# Patient Record
Sex: MALE | Race: WHITE | NOT HISPANIC OR LATINO | Employment: FULL TIME | URBAN - METROPOLITAN AREA
[De-identification: names, ages, dates, MRNs, and addresses within clinical notes are randomized per-mention and may not be internally consistent; named-entity substitution may affect disease eponyms.]

---

## 2017-01-06 ENCOUNTER — OFFICE VISIT (OUTPATIENT)
Dept: FAMILY MEDICINE | Facility: CLINIC | Age: 32
End: 2017-01-06
Payer: COMMERCIAL

## 2017-01-06 VITALS
TEMPERATURE: 97.6 F | HEART RATE: 69 BPM | WEIGHT: 157 LBS | BODY MASS INDEX: 22.2 KG/M2 | OXYGEN SATURATION: 100 % | DIASTOLIC BLOOD PRESSURE: 78 MMHG | SYSTOLIC BLOOD PRESSURE: 130 MMHG

## 2017-01-06 DIAGNOSIS — J01.90 ACUTE SINUSITIS WITH SYMPTOMS > 10 DAYS: ICD-10-CM

## 2017-01-06 DIAGNOSIS — J20.9 ACUTE BRONCHITIS, UNSPECIFIED ORGANISM: Primary | ICD-10-CM

## 2017-01-06 PROCEDURE — 99213 OFFICE O/P EST LOW 20 MIN: CPT | Performed by: PHYSICIAN ASSISTANT

## 2017-01-06 RX ORDER — AZITHROMYCIN 250 MG/1
TABLET, FILM COATED ORAL
Qty: 6 TABLET | Refills: 0 | Status: SHIPPED | OUTPATIENT
Start: 2017-01-06 | End: 2017-05-08

## 2017-01-06 ASSESSMENT — PAIN SCALES - GENERAL: PAINLEVEL: NO PAIN (0)

## 2017-01-06 NOTE — PROGRESS NOTES
SUBJECTIVE:                                                    Emir Angela is a 31 year old male who presents to clinic today for the following health issues:      RESPIRATORY SYMPTOMS      Duration: 2 weeks    Description  nasal congestion, rhinorrhea, sore throat, facial pain/pressure, cough, fatigue/malaise, myalgias and chest feels tight when cough    Severity: moderate    Accompanying signs and symptoms: None    History (predisposing factors):  none    Precipitating or alleviating factors: None    Therapies tried and outcome:  Sudafed helps a little           Problem list and histories reviewed & adjusted, as indicated.  Additional history: 30 y/o male c/o not feeling well for the last 14 days.  Admits to the above symptoms with sinus pressure and congestion being the worst.     Problem list, Medication list, Allergies, and Medical/Social/Surgical histories reviewed in EPIC and updated as appropriate.    ROS:  Constitutional, HEENT, cardiovascular, pulmonary, gi and gu systems are negative, except as otherwise noted.    OBJECTIVE:                                                    /78 mmHg  Pulse 69  Temp(Src) 97.6  F (36.4  C) (Oral)  Wt 157 lb (71.215 kg)  SpO2 100%  Body mass index is 22.2 kg/(m^2).  GENERAL: alert and no distress  EYES: Eyes grossly normal to inspection  HENT: b/l TM dullness with loss of light reflux nasal mucosa is erythematous and edematous   NECK: no adenopathy, no asymmetry, masses, or scars and thyroid normal to palpation  RESP: rhonchi throughout  CV: regular rate and rhythm, normal S1 S2, no S3 or S4, no murmur, click or rub, no peripheral edema and peripheral pulses strong    Diagnostic Test Results:  none      ASSESSMENT/PLAN:                                                            1. Acute bronchitis, unspecified organism  OTC probiotics while on antibiotic to help limit some potential side effects.   - azithromycin (ZITHROMAX) 250 MG tablet; Two tablets first  day, then one tablet daily for four days.  Dispense: 6 tablet; Refill: 0    2. Acute sinusitis with symptoms > 10 days    - azithromycin (ZITHROMAX) 250 MG tablet; Two tablets first day, then one tablet daily for four days.  Dispense: 6 tablet; Refill: 0    Follow up if symptoms persist or worsen     Onel Chun PA-C  [facility]

## 2017-01-06 NOTE — NURSING NOTE
"No chief complaint on file.      Initial /78 mmHg  Pulse 69  Temp(Src) 97.6  F (36.4  C) (Oral)  Wt 157 lb (71.215 kg)  SpO2 100% Estimated body mass index is 22.2 kg/(m^2) as calculated from the following:    Height as of 2/22/16: 5' 10.5\" (1.791 m).    Weight as of this encounter: 157 lb (71.215 kg).  BP completed using cuff size: regular    "

## 2017-05-08 ENCOUNTER — OFFICE VISIT (OUTPATIENT)
Dept: FAMILY MEDICINE | Facility: CLINIC | Age: 32
End: 2017-05-08
Payer: COMMERCIAL

## 2017-05-08 VITALS
DIASTOLIC BLOOD PRESSURE: 74 MMHG | BODY MASS INDEX: 21.98 KG/M2 | WEIGHT: 157 LBS | SYSTOLIC BLOOD PRESSURE: 132 MMHG | HEART RATE: 85 BPM | HEIGHT: 71 IN | TEMPERATURE: 99.4 F | OXYGEN SATURATION: 98 %

## 2017-05-08 DIAGNOSIS — R07.0 THROAT PAIN: Primary | ICD-10-CM

## 2017-05-08 DIAGNOSIS — J06.9 UPPER RESPIRATORY TRACT INFECTION, UNSPECIFIED TYPE: ICD-10-CM

## 2017-05-08 LAB
DEPRECATED S PYO AG THROAT QL EIA: NORMAL
MICRO REPORT STATUS: NORMAL
SPECIMEN SOURCE: NORMAL

## 2017-05-08 PROCEDURE — 87081 CULTURE SCREEN ONLY: CPT | Performed by: FAMILY MEDICINE

## 2017-05-08 PROCEDURE — 99213 OFFICE O/P EST LOW 20 MIN: CPT | Performed by: FAMILY MEDICINE

## 2017-05-08 PROCEDURE — 87880 STREP A ASSAY W/OPTIC: CPT | Performed by: FAMILY MEDICINE

## 2017-05-08 NOTE — PATIENT INSTRUCTIONS
[facility]    If you have any questions regarding to your visit please contact your care team:       Team Purple:   Clinic Hours Telephone Number   [address] Dr[address]   7am-7pm  Monday - Thursday   7am-5pm  Fridays  [phone]  (Appointment scheduling available 24/7)    Questions about your Visit?   Team Line:  [phone]   Urgent Care - [place] and [facility] - 11am-9pm Monday-Friday Saturday-Sunday- 9am-5pm   [place] - 5pm-9pm Monday-Friday Saturday-Sunday- 9am-5pm  [phone] - [facility]  [phone] - [place]       What options do I have for visits at the clinic other than the traditional office visit?  To expand how we care for you, many of our providers are utilizing electronic visits (e-visits) and telephone visits, when medically appropriate, for interactions with their patients rather than a visit in the clinic.   We also offer nurse visits for many medical concerns. Just like any other service, we will bill your insurance company for this type of visit based on time spent on the phone with your provider. Not all insurance companies cover these visits. Please check with your medical insurance if this type of visit is covered. You will be responsible for any charges that are not paid by your insurance.      E-visits via MembraneX:  generally incur a $35.00 fee.  Telephone visits:  Time spent on the phone: *charged based on time that is spent on the phone in increments of 10 minutes. Estimated cost:   5-10 mins $30.00   11-20 mins. $59.00   21-30 mins. $85.00     Use Naverat (secure email communication and access to your chart) to send your primary care provider a message or make an appointment. Ask someone on your Team how to sign up for MembraneX.  For a Price Quote for your services, please call our Consumer Price Line at [phone].  As always, Thank you for trusting us with your health care needs!

## 2017-05-08 NOTE — MR AVS SNAPSHOT
After Visit Summary   5/8/2017    Emir Angela    MRN: [id number]           Patient Information     Date Of Birth          1985        Visit Information        Provider Department      5/8/2017 10:30 AM Yanci Casper MD [facility]        Today's Diagnoses     Throat pain    -  1      Care Instructions    [facility]    If you have any questions regarding to your visit please contact your care team:       Team Purple:   Clinic Hours Telephone Number   [address] Dr.   7am-7pm  Monday - Thursday   7am-5pm  Fridays  [phone]  (Appointment scheduling available 24/7)    Questions about your Visit?   Team Line:  [phone]   Urgent Care - [place] and [facility] - 11am-9pm Monday-Friday Saturday-Sunday- 9am-5pm   [place] - 5pm-9pm Monday-Friday Saturday-Sunday- 9am-5pm  [phone] - [facility]  [phone] - [place]       What options do I have for visits at the clinic other than the traditional office visit?  To expand how we care for you, many of our providers are utilizing electronic visits (e-visits) and telephone visits, when medically appropriate, for interactions with their patients rather than a visit in the clinic.   We also offer nurse visits for many medical concerns. Just like any other service, we will bill your insurance company for this type of visit based on time spent on the phone with your provider. Not all insurance companies cover these visits. Please check with your medical insurance if this type of visit is covered. You will be responsible for any charges that are not paid by your insurance.      E-visits via NinthDecimal:  generally incur a $35.00 fee.  Telephone visits:  Time spent on the phone: *charged based on time that is spent on the phone in increments of 10 minutes. Estimated cost:   5-10 mins $30.00   11-20 mins. $59.00   21-30 mins. $85.00     Use  "MyChart (secure email communication and access to your chart) to send your primary care provider a message or make an appointment. Ask someone on your Team how to sign up for OrthoPediactricshart.  For a Price Quote for your services, please call our Cubicle Price Line at [phone].  As always, Thank you for trusting us with your health care needs!            Follow-ups after your visit        Who to contact     If you have questions or need follow up information about today's clinic visit or your schedule please contact [facility] directly at [phone].  Normal or non-critical lab and imaging results will be communicated to you by MyChart, letter or phone within 4 business days after the clinic has received the results. If you do not hear from us within 7 days, please contact the clinic through OrthoPediactricshart or phone. If you have a critical or abnormal lab result, we will notify you by phone as soon as possible.  Submit refill requests through [email or web address] or call your pharmacy and they will forward the refill request to us. Please allow 3 business days for your refill to be completed.          Additional Information About Your Visit        MyChart Information     OrthoPediactricshart gives you secure access to your electronic health record. If you see a primary care provider, you can also send messages to your care team and make appointments. If you have questions, please call your primary care clinic.  If you do not have a primary care provider, please call [phone] and they will assist you.        Care EveryWhere ID     This is your Care EveryWhere ID. This could be used by other organizations to access your [facility] medical records  [id number]        Your Vitals Were     Pulse Temperature Height Pulse Oximetry BMI (Body Mass Index)       85 99.4  F (37.4  C) 5' 10.5\" (1.791 m) 98% 22.21 kg/m2        Blood Pressure from Last 3 Encounters:   05/08/17 132/74   01/06/17 130/78   02/22/16 120/79    Weight from Last 3 " Encounters:   05/08/17 157 lb (71.2 kg)   01/06/17 157 lb (71.2 kg)   02/22/16 153 lb (69.4 kg)              We Performed the Following     Beta strep group A culture     Rapid strep screen        Primary Care Provider Office Phone #    [facility] [phone]       No address on file        Thank you!     Thank you for choosing [facility]  for your care. Our goal is always to provide you with excellent care. Hearing back from our patients is one way we can continue to improve our services. Please take a few minutes to complete the written survey that you may receive in the mail after your visit with us. Thank you!             Your Updated Medication List - Protect others around you: Learn how to safely use, store and throw away your medicines at www.disposemymeds.org.      Notice  As of 5/8/2017 11:00 AM    You have not been prescribed any medications.

## 2017-05-08 NOTE — LETTER
[address] [place] [address]    May 9, 2017    Emir Angela  [address] [address]      Dear Emir,    Your strep culture was negative. I wish you well.     Enclosed is a copy of your results.   Results for orders placed or performed in visit on 05/08/17   Rapid strep screen   Result Value Ref Range    Specimen Description Throat     Rapid Strep A Screen       NEGATIVE: No Group A streptococcal antigen detected by immunoassay, await   culture report.      Micro Report Status FINAL 05/08/2017    Beta strep group A culture   Result Value Ref Range    Specimen Description Throat     Culture Micro No Beta Streptococcus isolated     Micro Report Status FINAL 05/09/2017        If you have any questions or concerns, please call myself or my nurse at [phone].    Sincerely,    Yanci Casper MD/mame

## 2017-05-08 NOTE — NURSING NOTE
"Chief Complaint   Patient presents with     URI       Initial /74 (BP Location: Right arm, Patient Position: Chair, Cuff Size: Adult Large)  Pulse 85  Temp 99.4  F (37.4  C)  Ht 5' 10.5\" (1.791 m)  Wt 157 lb (71.2 kg)  SpO2 98%  BMI 22.21 kg/m2 Estimated body mass index is 22.21 kg/(m^2) as calculated from the following:    Height as of this encounter: 5' 10.5\" (1.791 m).    Weight as of this encounter: 157 lb (71.2 kg).  Medication Reconciliation: complete   Nayeli Montgomery MA      "

## 2017-05-08 NOTE — PROGRESS NOTES
SUBJECTIVE:                                                    Emir Angela is a 32 year old male who presents to clinic today for the following health issues:      ENT Symptoms             Symptoms: cc Present Absent Comment   Fever/Chills  x     Fatigue  x     Muscle Aches  x     Eye Irritation   x    Sneezing  x     Nasal Talha/Drg  x     Sinus Pressure/Pain  x     Loss of smell  x     Dental pain   x    Sore Throat  x     Swollen Glands   x    Ear Pain/Fullness   x    Cough  x     Wheeze  x     Chest Pain  x     Shortness of breath  x     Rash       Other         Symptom duration:  5 days   Symptom severity:  severe   Treatments tried:  tylenol,fluids,rest   Contacts:  no              Problem list and histories reviewed & adjusted, as indicated.  Additional history: as documented    Patient Active Problem List   Diagnosis     Cervicalgia     Nonallopathic lesion of thoracic region     Chronic nonallergic rhinitis     GERD (gastroesophageal reflux disease)     CARDIOVASCULAR SCREENING; LDL GOAL LESS THAN 160     Sacral pain     Urinary frequency     Gastroesophageal reflux disease without esophagitis     Hip pain, bilateral     Past Surgical History:   Procedure Laterality Date     APPENDECTOMY  2004     SINUS SURGERY  4/27/10       Social History   Substance Use Topics     Smoking status: Never Smoker     Smokeless tobacco: Never Used     Alcohol use 0.0 oz/week     0 Standard drinks or equivalent per week      Comment: once a wk      Family History   Problem Relation Age of Onset     Hypertension Father      Cardiovascular Maternal Grandfather      HEART DISEASE Maternal Grandfather      Allergies Brother          No current outpatient prescriptions on file.     Labs reviewed in EPIC    Reviewed and updated as needed this visit by clinical staff  Tobacco  Allergies  Meds  Med Hx  Surg Hx  Fam Hx  Soc Hx      Reviewed and updated as needed this visit by Provider         ROS:  This 32 year old male is  "here today because he has had a sore throat for the past 5 days. No fevers. He is an . He is flying to  [place] tomorrow to participate in a large disk golf tournament. He has had symptoms of a little head cold as well.  All other review of systems are negative  Personal, family, and social history reviewed with patient and revised.         OBJECTIVE:                                                    /74 (BP Location: Right arm, Patient Position: Chair, Cuff Size: Adult Large)  Pulse 85  Temp 99.4  F (37.4  C)  Ht 5' 10.5\" (1.791 m)  Wt 157 lb (71.2 kg)  SpO2 98%  BMI 22.21 kg/m2  Body mass index is 22.21 kg/(m^2).  GENERAL: healthy, alert and no distress  EYES: Eyes grossly normal to inspection, PERRL and conjunctivae and sclerae normal  HENT: ear canals and TM's normal, nose and mouth without ulcers or lesions but he has mucous down the back of his throat. Most likely due to a viral upper respiratory illness and the source of his sore throat.   NECK: no adenopathy, no asymmetry, masses, or scars and thyroid normal to palpation  RESP: lungs clear to auscultation - no rales, rhonchi or wheezes  CV: regular rate and rhythm, normal S1 S2, no S3 or S4, no murmur, click or rub, no peripheral edema and peripheral pulses strong  MS: no gross musculoskeletal defects noted, no edema    Diagnostic Test Results:  Results for orders placed or performed in visit on 05/08/17 (from the past 24 hour(s))   Rapid strep screen   Result Value Ref Range    Specimen Description Throat     Rapid Strep A Screen       NEGATIVE: No Group A streptococcal antigen detected by immunoassay, await   culture report.      Micro Report Status FINAL 05/08/2017         ASSESSMENT/PLAN:                                                             1. Throat pain  As above   - Rapid strep screen  - Beta strep group A culture    2. Upper respiratory tract infection, unspecified type  Reassured. OK to fly  Stay well hydrated "       Return to clinic if no improvement     TRUPTI MADISON MD  [facility]

## 2017-05-09 LAB
BACTERIA SPEC CULT: NORMAL
MICRO REPORT STATUS: NORMAL
SPECIMEN SOURCE: NORMAL

## 2018-03-26 ENCOUNTER — OFFICE VISIT (OUTPATIENT)
Dept: FAMILY MEDICINE | Facility: CLINIC | Age: 33
End: 2018-03-26
Payer: COMMERCIAL

## 2018-03-26 VITALS
SYSTOLIC BLOOD PRESSURE: 124 MMHG | HEIGHT: 71 IN | RESPIRATION RATE: 15 BRPM | BODY MASS INDEX: 22.02 KG/M2 | HEART RATE: 80 BPM | OXYGEN SATURATION: 100 % | DIASTOLIC BLOOD PRESSURE: 76 MMHG | TEMPERATURE: 98.4 F | WEIGHT: 157.3 LBS

## 2018-03-26 DIAGNOSIS — K21.00 GASTROESOPHAGEAL REFLUX DISEASE WITH ESOPHAGITIS: Primary | ICD-10-CM

## 2018-03-26 DIAGNOSIS — R00.2 PALPITATIONS: ICD-10-CM

## 2018-03-26 PROCEDURE — 99214 OFFICE O/P EST MOD 30 MIN: CPT | Performed by: FAMILY MEDICINE

## 2018-03-26 NOTE — NURSING NOTE
"Chief Complaint   Patient presents with     RECHECK     Heartburn concerns- also feels as if he's been having some heart palpitations recently.  Notices this mostly when he lays down.       Initial There were no vitals taken for this visit. Estimated body mass index is 22.21 kg/(m^2) as calculated from the following:    Height as of 5/8/17: 5' 10.5\" (1.791 m).    Weight as of 5/8/17: 157 lb (71.2 kg).  Medication Reconciliation: complete    Health Maintenance Due Pending Provider Review:  NONE    n/a    Monica Art MA  [facility]  "

## 2018-03-26 NOTE — PROGRESS NOTES
SUBJECTIVE:   Emir Angela is a 33 year old male who presents to clinic today for the following health issues:      Chief Complaint   Patient presents with     RECHECK     Heartburn concerns- also feels as if he's been having some heart palpitations recently.  Notices this mostly when he lays down.     History of reflux since teenage years  Worse at night and with certain foods  Takes a zantac  Never had any workup in the past, got some Rx prilosec in the past    Mom has bad reflux    palpitatons 6 x in the last 6 months  Center/middle of chest, feels like irregular or skipping beats, even stopping feeling  Has not checked pulse while doing it    Feels like he just worked out but laying flat      ROS: As per HPI.    Ears/Nose/Throat: No runny nose, sore throat or ear pain  CV:  no lower extremity swelling.  Resp: no shortness of breath, wheezing, or cough.  GI: no nausea/vomiting/diarrhea, no black or bloody stools      I have reviewed and updated the patient's past medical history in the EMR. Current problems are:    Patient Active Problem List   Diagnosis     Cervicalgia     Nonallopathic lesion of thoracic region     Chronic nonallergic rhinitis     GERD (gastroesophageal reflux disease)     CARDIOVASCULAR SCREENING; LDL GOAL LESS THAN 160     Sacral pain     Urinary frequency     Gastroesophageal reflux disease without esophagitis     Hip pain, bilateral     Past Surgical History:   Procedure Laterality Date     APPENDECTOMY  2004     SINUS SURGERY  4/27/10       Social History   Substance Use Topics     Smoking status: Never Smoker     Smokeless tobacco: Never Used     Alcohol use 0.0 oz/week     0 Standard drinks or equivalent per week      Comment: once a wk      Family History   Problem Relation Age of Onset     Hypertension Father      Cardiovascular Maternal Grandfather      HEART DISEASE Maternal Grandfather      Allergies Brother          Allergies, reviewed:     Allergies   Allergen Reactions      "Amoxicillin      Sulfa Drugs Hives       No current outpatient prescriptions on file.       Objective:  /76  Pulse 80  Temp 98.4  F (36.9  C) (Oral)  Resp 15  Ht 5' 10.5\" (1.791 m)  Wt 157 lb 4.8 oz (71.4 kg)  SpO2 100%  BMI 22.25 kg/m2  General Appearance: Pleasant, alert, WN/WD in no acute respiratory distress.  Chest/Respiratory Exam: Normal, comfortable, easy respirations. Chest wall normal. Lungs are clear to auscultation. No wheezing, crackles, or rhonchi.  Cardiovascular Exam: Regular rate and rhythm. No murmur, gallop, or rubs. No pedal edema.  Gastrointestinal Exam: Soft, non-tender, no masses or organomegaly.  Skin: no rash, warm and dry.  Neurologic Exam: Nonfocal, no tremor. Normal gait.  Psychiatric Exam: Alert - appropriate, normal affect    ASSESSMENT/PLAN:    ICD-10-CM    1. Gastroesophageal reflux disease with esophagitis K21.0 GASTROENTEROLOGY ADULT REF PROCEDURE ONLY [facility] [phone]; [facility]     GENERAL SURG ADULT REFERRAL   2. Palpitations R00.2       Significant reflux symptoms over the last 10 years without any history of follow-up for this  Strong family history of reflux  No known history of esophageal cancer or Patel's esophagus  Discussed the pathophysiology of reflux and his esophageal sphincter  This could be evaluated as well as screening for esophagitis with a upper GI endoscopy  Discussed the differences in medications between H2 blockers and proton pump inhibitors  In consideration of follow-up with general surgery for a procedure to improve the tone of his esophageal sphincter, sometimes considered a last resort but may be indicated in a younger person who does not want to take medication for the rest of his life    Palpitations with  Differential Dx includes atrial fibrillation and V. tach supraventricular tachycardia or premature ventricular contractions.  This very well could be due to his reflux  I also think that probability of " a cardiac or coronary artery disease related arrhythmia is very low because of: atypical symptoms, age, lack of other lifestyle risk factors. Also patient can exercise strenuously without problems  I lean towards reflux as a cause  Discussed more concerning symptoms and follow-up as needed    Bob Concepcion MD MPH

## 2018-03-26 NOTE — MR AVS SNAPSHOT
After Visit Summary   3/26/2018    Emir Angela    MRN: [id number]           Patient Information     Date Of Birth          1985        Visit Information        Provider Department      3/26/2018 1:00 PM Bob Concepcion MD [facility]        Today's Diagnoses     Gastroesophageal reflux disease with esophagitis    -  1    Palpitations           Follow-ups after your visit        Additional Services     GASTROENTEROLOGY ADULT REF PROCEDURE ONLY Coy Sotomayorierge [phone]; [facility]       Last Lab Result: Creatinine (mg/dL)       Date                     Value                 01/08/2010               1.14             ----------  Body mass index is 22.25 kg/(m^2).     Needed:  No  Language:  English    Patient will be contacted to schedule procedure.     Please be aware that coverage of these services is subject to the terms and limitations of your health insurance plan.  Call member services at your health plan with any benefit or coverage questions.  Any procedures must be performed at a [facility] facility OR coordinated by your clinic's referral office.    Please bring the following with you to your appointment:    (1) Any X-Rays, CTs or MRIs which have been performed.  Contact the facility where they were done to arrange for  prior to your scheduled appointment.    (2) List of current medications   (3) This referral request   (4) Any documents/labs given to you for this referral            GENERAL SURG ADULT REFERRAL       Your provider has referred you to: SARAH: [facility] [place] [phone]   [email or web address]    Please be aware that coverage of these services is subject to the terms and limitations of your health insurance plan.  Call member services at your health plan with any benefit or coverage questions.      Please bring the following with you to your appointment:    (1)  "Any X-Rays, CTs or MRIs which have been performed.  Contact the facility where they were done to arrange for  prior to your scheduled appointment.   (2) List of current medications   (3) This referral request   (4) Any documents/labs given to you for this referral                  Who to contact     If you have questions or need follow up information about today's clinic visit or your schedule please contact [facility] directly at [phone].  Normal or non-critical lab and imaging results will be communicated to you by [employer], letter or phone within 4 business days after the clinic has received the results. If you do not hear from us within 7 days, please contact the clinic through [employer] or phone. If you have a critical or abnormal lab result, we will notify you by phone as soon as possible.  Submit refill requests through [employer] or call your pharmacy and they will forward the refill request to us. Please allow 3 business days for your refill to be completed.          Additional Information About Your Visit        [employer] Information     [employer] gives you secure access to your electronic health record. If you see a primary care provider, you can also send messages to your care team and make appointments. If you have questions, please call your primary care clinic.  If you do not have a primary care provider, please call [phone] and they will assist you.        Care EveryWhere ID     This is your Care EveryWhere ID. This could be used by other organizations to access your [facility] medical records  [id number]        Your Vitals Were     Pulse Temperature Respirations Height Pulse Oximetry BMI (Body Mass Index)    80 98.4  F (36.9  C) (Oral) 15 5' 10.5\" (1.791 m) 100% 22.25 kg/m2       Blood Pressure from Last 3 Encounters:   03/26/18 124/76   05/08/17 132/74   01/06/17 130/78    Weight from Last 3 Encounters:   03/26/18 157 lb 4.8 oz (71.4 kg)   05/08/17 157 lb (71.2 kg)   01/06/17 157 " lb (71.2 kg)              We Performed the Following     GASTROENTEROLOGY ADULT REF PROCEDURE ONLY Coy Foote [phone]; [facility]     GENERAL SURG ADULT REFERRAL        Primary Care Provider Office Phone # Fax #    [facility] [phone] [address], [address]  [facility] [address]        Equal Access to Services     VERONA BANERJEE : Hadii aad ku hadasho Soomaali, waaxda luqadaha, qaybta kaalmada adeegyada, waxay idiin hayaan adeeg kharash la'aan . So [facility] [phone].    ATENCIÓN: Si habla español, tiene a ansari disposición servicios gratuitos de asistencia lingüística. Nava al [phone].    We comply with applicable federal civil rights laws and [place] laws. We do not discriminate on the basis of race, color, national origin, age, disability, sex, sexual orientation, or gender identity.            Thank you!     Thank you for choosing [facility]  for your care. Our goal is always to provide you with excellent care. Hearing back from our patients is one way we can continue to improve our services. Please take a few minutes to complete the written survey that you may receive in the mail after your visit with us. Thank you!             Your Updated Medication List - Protect others around you: Learn how to safely use, store and throw away your medicines at www.disposemymeds.org.      Notice  As of 3/26/2018  1:28 PM    You have not been prescribed any medications.

## 2018-10-29 ENCOUNTER — TELEPHONE (OUTPATIENT)
Dept: FAMILY MEDICINE | Facility: CLINIC | Age: 33
End: 2018-10-29

## 2018-10-29 DIAGNOSIS — K21.9 GASTROESOPHAGEAL REFLUX DISEASE, ESOPHAGITIS PRESENCE NOT SPECIFIED: Primary | ICD-10-CM

## 2018-10-29 NOTE — TELEPHONE ENCOUNTER
Pt seen Dr. Concepcion in March of 2018 for heartburn and acid reflux.  Dr. Concepcion submitted an order, however it is for a colonoscopy, rather than endoscopy.  Please reorder or advise pt so that he can make appointment.  Please send myChart note so pt knows that he can make appt.

## 2018-11-09 ENCOUNTER — HOSPITAL ENCOUNTER (OUTPATIENT)
Facility: CLINIC | Age: 33
Discharge: HOME | End: 2018-11-09
Attending: SURGERY | Admitting: SURGERY
Payer: COMMERCIAL

## 2018-11-09 ENCOUNTER — SURGERY (OUTPATIENT)
Age: 33
End: 2018-11-09

## 2018-11-09 ENCOUNTER — APPOINTMENT (OUTPATIENT)
Dept: SURGERY | Facility: PHYSICIAN GROUP | Age: 33
End: 2018-11-09
Payer: COMMERCIAL

## 2018-11-09 VITALS
DIASTOLIC BLOOD PRESSURE: 81 MMHG | RESPIRATION RATE: 12 BRPM | SYSTOLIC BLOOD PRESSURE: 118 MMHG | WEIGHT: 155 LBS | OXYGEN SATURATION: 97 % | HEIGHT: 71 IN | BODY MASS INDEX: 21.7 KG/M2

## 2018-11-09 LAB — UPPER GI ENDOSCOPY: NORMAL

## 2018-11-09 PROCEDURE — 25000128 H RX IP 250 OP 636: Performed by: SURGERY

## 2018-11-09 PROCEDURE — 43239 EGD BIOPSY SINGLE/MULTIPLE: CPT | Performed by: SURGERY

## 2018-11-09 PROCEDURE — 88305 TISSUE EXAM BY PATHOLOGIST: CPT | Performed by: SURGERY

## 2018-11-09 PROCEDURE — 99152 MOD SED SAME PHYS/QHP 5/>YRS: CPT | Mod: 59 | Performed by: SURGERY

## 2018-11-09 PROCEDURE — G0500 MOD SEDAT ENDO SERVICE >5YRS: HCPCS | Performed by: SURGERY

## 2018-11-09 PROCEDURE — 25000125 ZZHC RX 250: Performed by: SURGERY

## 2018-11-09 PROCEDURE — 88305 TISSUE EXAM BY PATHOLOGIST: CPT | Mod: 26 | Performed by: SURGERY

## 2018-11-09 RX ORDER — LIDOCAINE 40 MG/G
CREAM TOPICAL
Status: DISCONTINUED | OUTPATIENT
Start: 2018-11-09 | End: 2018-11-09 | Stop reason: HOSPADM

## 2018-11-09 RX ORDER — ONDANSETRON 2 MG/ML
4 INJECTION INTRAMUSCULAR; INTRAVENOUS
Status: DISCONTINUED | OUTPATIENT
Start: 2018-11-09 | End: 2018-11-09 | Stop reason: HOSPADM

## 2018-11-09 RX ORDER — FENTANYL CITRATE 50 UG/ML
INJECTION, SOLUTION INTRAMUSCULAR; INTRAVENOUS PRN
Status: DISCONTINUED | OUTPATIENT
Start: 2018-11-09 | End: 2018-11-09 | Stop reason: HOSPADM

## 2018-11-09 RX ADMIN — MIDAZOLAM 2 MG: 1 INJECTION INTRAMUSCULAR; INTRAVENOUS at 12:29

## 2018-11-09 RX ADMIN — TOPICAL ANESTHETIC 1 SPRAY: 200 SPRAY DENTAL; PERIODONTAL at 12:28

## 2018-11-09 RX ADMIN — FENTANYL CITRATE 100 MCG: 50 INJECTION, SOLUTION INTRAMUSCULAR; INTRAVENOUS at 12:29

## 2018-11-12 LAB — COPATH REPORT: NORMAL

## 2019-01-18 ENCOUNTER — OFFICE VISIT (OUTPATIENT)
Dept: FAMILY MEDICINE | Facility: CLINIC | Age: 34
End: 2019-01-18
Payer: COMMERCIAL

## 2019-01-18 VITALS
HEART RATE: 68 BPM | WEIGHT: 154.5 LBS | OXYGEN SATURATION: 99 % | BODY MASS INDEX: 21.63 KG/M2 | HEIGHT: 71 IN | DIASTOLIC BLOOD PRESSURE: 88 MMHG | TEMPERATURE: 97.4 F | SYSTOLIC BLOOD PRESSURE: 130 MMHG

## 2019-01-18 DIAGNOSIS — R07.0 THROAT PAIN: Primary | ICD-10-CM

## 2019-01-18 DIAGNOSIS — B34.9 VIRAL ILLNESS: ICD-10-CM

## 2019-01-18 LAB
DEPRECATED S PYO AG THROAT QL EIA: NORMAL
SPECIMEN SOURCE: NORMAL

## 2019-01-18 PROCEDURE — 87880 STREP A ASSAY W/OPTIC: CPT | Performed by: PHYSICIAN ASSISTANT

## 2019-01-18 PROCEDURE — 87081 CULTURE SCREEN ONLY: CPT | Performed by: PHYSICIAN ASSISTANT

## 2019-01-18 PROCEDURE — 99213 OFFICE O/P EST LOW 20 MIN: CPT | Performed by: PHYSICIAN ASSISTANT

## 2019-01-18 ASSESSMENT — MIFFLIN-ST. JEOR: SCORE: 1655

## 2019-01-18 NOTE — PROGRESS NOTES
"  SUBJECTIVE:   Emir Angela is a 33 year old male who presents to clinic today for the following health issues:      RESPIRATORY SYMPTOMS      Duration: x3 days     Description  nasal congestion, rhinorrhea, sore throat, facial pain/pressure, cough, wheezing, fever, chills, ear discomfort - left, headache, fatigue/malaise and hoarse voice    Severity: moderate to severe    Accompanying signs and symptoms: None    History (predisposing factors):  none    Precipitating or alleviating factors: None    Therapies tried and outcome:  Acetaminophen - helps with sx temporarily           Problem list and histories reviewed & adjusted, as indicated.  Additional history: 34 y/o male c/o not feeling well for the last 3 days.  Admits to the above symptoms with ST being the worst.     BP Readings from Last 3 Encounters:   01/18/19 130/88   11/09/18 118/81   03/26/18 124/76    Wt Readings from Last 3 Encounters:   01/18/19 70.1 kg (154 lb 8 oz)   11/09/18 70.3 kg (155 lb)   03/26/18 71.4 kg (157 lb 4.8 oz)                    Reviewed and updated as needed this visit by clinical staff       Reviewed and updated as needed this visit by Provider         ROS:  Constitutional, HEENT, cardiovascular, pulmonary, gi and gu systems are negative, except as otherwise noted.    OBJECTIVE:     /88   Pulse 68   Temp 97.4  F (36.3  C) (Oral)   Ht 1.791 m (5' 10.5\")   Wt 70.1 kg (154 lb 8 oz)   SpO2 99%   BMI 21.86 kg/m    Body mass index is 21.86 kg/m .  GENERAL: alert and no distress  EYES: Eyes grossly normal to inspection  HENT: ear canals and TM's normal, nose and mouth without ulcers or lesions  NECK: no adenopathy, no asymmetry, masses, or scars and thyroid normal to palpation  RESP: lungs clear to auscultation - no rales, rhonchi or wheezes  CV: regular rate and rhythm, normal S1 S2, no S3 or S4, no murmur, click or rub, no peripheral edema and peripheral pulses strong  PSYCH: mentation appears normal, affect " normal/bright    Diagnostic Test Results:  Results for orders placed or performed in visit on 01/18/19 (from the past 24 hour(s))   Strep, Rapid Screen   Result Value Ref Range    Specimen Description Throat     Rapid Strep A Screen       NEGATIVE: No Group A streptococcal antigen detected by immunoassay, await culture report.       ASSESSMENT/PLAN:             1. Throat pain    - Strep, Rapid Screen  - Beta strep group A culture    2. Viral illness  Supportive care.      Follow up if symptoms persist or worsen     Onel Chun PA-C  [facility]

## 2019-01-18 NOTE — NURSING NOTE
"Chief Complaint   Patient presents with     URI     /88   Pulse 68   Temp 97.4  F (36.3  C) (Oral)   Ht 1.791 m (5' 10.5\")   Wt 70.1 kg (154 lb 8 oz)   SpO2 99%   BMI 21.86 kg/m   Estimated body mass index is 21.86 kg/m  as calculated from the following:    Height as of this encounter: 1.791 m (5' 10.5\").    Weight as of this encounter: 70.1 kg (154 lb 8 oz).  Medication Reconciliation: complete      Health Maintenance that is potentially due pending provider review:  NONE    n/a    CHA Almonte  "

## 2019-01-20 LAB
BACTERIA SPEC CULT: NORMAL
SPECIMEN SOURCE: NORMAL

## 2019-02-21 ENCOUNTER — MYC MEDICAL ADVICE (OUTPATIENT)
Dept: FAMILY MEDICINE | Facility: CLINIC | Age: 34
End: 2019-02-21

## 2019-06-14 ENCOUNTER — OFFICE VISIT (OUTPATIENT)
Dept: FAMILY MEDICINE | Facility: CLINIC | Age: 34
End: 2019-06-14
Payer: COMMERCIAL

## 2019-06-14 VITALS
RESPIRATION RATE: 12 BRPM | TEMPERATURE: 97.9 F | WEIGHT: 150 LBS | SYSTOLIC BLOOD PRESSURE: 116 MMHG | HEIGHT: 70 IN | DIASTOLIC BLOOD PRESSURE: 80 MMHG | HEART RATE: 75 BPM | BODY MASS INDEX: 21.47 KG/M2 | OXYGEN SATURATION: 97 %

## 2019-06-14 DIAGNOSIS — K62.89 RECTAL PAIN: Primary | ICD-10-CM

## 2019-06-14 DIAGNOSIS — L30.9 DERMATITIS: ICD-10-CM

## 2019-06-14 PROCEDURE — 99213 OFFICE O/P EST LOW 20 MIN: CPT | Performed by: PHYSICIAN ASSISTANT

## 2019-06-14 RX ORDER — HYDROCORTISONE VALERATE CREAM 2 MG/G
CREAM TOPICAL 2 TIMES DAILY
Qty: 15 G | Refills: 0 | Status: ON HOLD | OUTPATIENT
Start: 2019-06-14 | End: 2020-05-20

## 2019-06-14 ASSESSMENT — MIFFLIN-ST. JEOR: SCORE: 1621.65

## 2019-06-14 NOTE — NURSING NOTE
"Chief Complaint   Patient presents with     Establish Care     Derm Problem     Rectal Problem     /80 (BP Location: Right arm, Patient Position: Sitting, Cuff Size: Adult Regular)   Pulse 75   Temp 97.9  F (36.6  C) (Oral)   Resp 12   Ht 1.778 m (5' 10\")   Wt 68 kg (150 lb)   SpO2 97%   BMI 21.52 kg/m   Estimated body mass index is 21.52 kg/m  as calculated from the following:    Height as of this encounter: 1.778 m (5' 10\").    Weight as of this encounter: 68 kg (150 lb).  bp completed using cuff size: regular       Health Maintenance addressed:  NONE    n/a    Chris Crump MA     "

## 2019-06-14 NOTE — PROGRESS NOTES
"Subjective     Emir Angela is a 34 year old male who presents to clinic today for the following health issues:    HPI   New Patient/Transfer of Care  Rectal problem       Duration: 6 months -1 year    Description:   Pain: YES  Itching: no     Accompanying signs and symptoms:   Blood in stool: no   Changes in stool pattern: no     History (similar episodes/previous evaluation): None    Precipitating or alleviating factors: None    Therapies tried and outcome: none        Derm       Duration: 10 weeks     Description  Location: forehead   Itching: no but burning sensation     Intensity:  mild    Accompanying signs and symptoms: raised bumps, sensitive to touch      History (similar episodes/previous evaluation): None    Precipitating or alleviating factors:  New exposures:  None but patient think that it may be related to stress of having a new baby   Recent travel: no      Therapies tried and outcome: Aquaphor         BP Readings from Last 3 Encounters:   06/14/19 116/80   01/18/19 130/88   11/09/18 118/81    Wt Readings from Last 3 Encounters:   06/14/19 68 kg (150 lb)   01/18/19 70.1 kg (154 lb 8 oz)   11/09/18 70.3 kg (155 lb)                      Reviewed and updated as needed this visit by Provider         Review of Systems   ROS COMP: Constitutional, HEENT, cardiovascular, pulmonary, gi and gu systems are negative, except as otherwise noted.      Objective    /80 (BP Location: Right arm, Patient Position: Sitting, Cuff Size: Adult Regular)   Pulse 75   Temp 97.9  F (36.6  C) (Oral)   Resp 12   Ht 1.778 m (5' 10\")   Wt 68 kg (150 lb)   SpO2 97%   BMI 21.52 kg/m    Body mass index is 21.52 kg/m .  Physical Exam   GENERAL: alert and no distress  EYES: Eyes grossly normal to inspection  HENT: ear canals and TM's normal, nose and mouth without ulcers or lesions  RESP: lungs clear to auscultation - no rales, rhonchi or wheezes  CV: regular rate and rhythm, normal S1 S2, no S3 or S4, no murmur, click " or rub, no peripheral edema and peripheral pulses strong  SKIN: mild erythema over forehead with signs of pruritis  PSYCH: mentation appears normal, affect normal/bright    Diagnostic Test Results:  Labs reviewed in Epic        Assessment & Plan     1. Rectal pain  Due to previous history, will refer to colorectal for further evaluation and treatment.  - COLORECTAL SURGERY REFERRAL    2. Dermatitis  Will trial low dose topical cortisone, if symptoms persist or worsen will refer to derm.  - hydrocortisone (WESTCORT) 0.2 % external cream; Apply topically 2 times daily  Dispense: 15 g; Refill: 0           No follow-ups on file.    Onel Chun PA-C  [facility]

## 2019-06-18 ENCOUNTER — PRE VISIT (OUTPATIENT)
Dept: SURGERY | Facility: CLINIC | Age: 34
End: 2019-06-18

## 2019-06-18 NOTE — TELEPHONE ENCOUNTER
RECORDS RECEIVED FROM: Internal    DATE RECEIVED: 8/9/19   NOTES STATUS DETAILS   OFFICE NOTE from referring provider  Internal OV note 6/14/19    OFFICE NOTE from other specialist   N/A    DISCHARGE SUMMARY from hospital  N/A    DISCHARGE REPORT from the ER N/A    OPERATIVE REPORT  N/A    MEDICATION LIST N/A    LABS     PFC TESTING N/A    ANAL PAP N/A    BIOPSIES/PATHOLOGY RELATED TO DIAGNOSIS N/A    DIAGNOSTIC PROCEDURES     COLONOSCOPY N/A    UPPER ENDOSCOPY (EGD) N/A    FLEX SIGMOIDOSCOPY  N/A    ERCP N/A    IMAGING (DISC & REPORT)      CT  N/A    MRI N/A    XRAY N/A    ULTRASOUND (ENDOANAL/ENDORECTAL) N/A

## 2019-08-05 ENCOUNTER — TELEPHONE (OUTPATIENT)
Dept: SURGERY | Facility: CLINIC | Age: 34
End: 2019-08-05

## 2019-08-05 NOTE — TELEPHONE ENCOUNTER
Called patient to verify appointment date, time, and location and move appointment from 11:15 to 11:00. Patient did not answer phone call. A message was left regarding details of the future appointment. A callback number was left if cancellation is needed.   Ghazala Ward, EMT

## 2019-08-09 ENCOUNTER — OFFICE VISIT (OUTPATIENT)
Dept: SURGERY | Facility: CLINIC | Age: 34
End: 2019-08-09
Attending: PHYSICIAN ASSISTANT
Payer: COMMERCIAL

## 2019-08-09 VITALS
BODY MASS INDEX: 22.01 KG/M2 | OXYGEN SATURATION: 99 % | HEART RATE: 63 BPM | HEIGHT: 71 IN | DIASTOLIC BLOOD PRESSURE: 86 MMHG | WEIGHT: 157.2 LBS | SYSTOLIC BLOOD PRESSURE: 123 MMHG

## 2019-08-09 DIAGNOSIS — K62.89 RECTAL PAIN: Primary | ICD-10-CM

## 2019-08-09 ASSESSMENT — MIFFLIN-ST. JEOR: SCORE: 1662.24

## 2019-08-09 ASSESSMENT — PAIN SCALES - GENERAL: PAINLEVEL: MODERATE PAIN (5)

## 2019-08-09 NOTE — LETTER
"     RE: Emir Angela  [address] [place] [address]     Dear Colleague,    Thank you for referring your patient, Emir Angela, to the [facility] COLON AND RECTAL SURGERY at [facility]. Please see a copy of my visit note below.    Colon and Rectal Surgery Consult Clinic Note    Date: 2019     Referring provider:  Onel Chun PA-C  [address] [facility] [address]  [place] [address]     RE: Emir Angela  : 1985  EAN: 2019    Emir Angela is a very pleasant 34 year old male with a significant past medical history of GERD who presents to the Colon and Rectal Surgery Clinic for an opinion on rectal pain. Patient reports this rectal pain is going on for approximately the last year and is mainly with orgasms. He reports his 30% of orgasms he has this type of pain. He also reports pain with some of his bowel movements but this is much more mild. His pain is sharp and in the inside of the rectal area right above the anal canal. Patient denies bloody stools although reports once every now and then in the toilet paper. Patient denies any diarrhea. Reports no urgency or incontinence. He also reports pain with palpation right at the tip of his tail bone but he indicates this pain is different that the rectal pain. In  had a pelvic MRI that showed clinical concern for a rectal fistula. Patient also denies any rectal intercourse, he denies any fevers or chills during this episodes of rectal pain. He reports no pain with urination either.     Physical Examination: Exam was chaperoned by Shakila Martin  /86 (BP Location: Left arm, Patient Position: Sitting, Cuff Size: Adult Regular)   Pulse 63   Ht 5' 10.5\"   Wt 157 lb 3.2 oz   SpO2 99%   BMI 22.24 kg/m     General: Alert and oriented, no distress  HEENT: no scleral icterus  Abdomen: soft non tender and no peritoneal signs   Extremities: no edema   Perianal external " examination:  Perianal skin: Intact with no excoriation or lichenification.  Lesions: No evidence of an external lesion, nodularity, or induration in the perianal region.  Eversion of buttocks: There was not evidence of an anal fissure. Details: N/A.  Skin tags or external hemorrhoids: No.  Digital rectal examination: Was performed.   Sphincter tone: Good.  Palpable lesions: No.  Prostate: Normal. No pain on palpation but slightly boggy prostate  Other: None.    Anoscopy: Was deferred    Assessment/Plan: Emir Angela is a very pleasant 34 year old male with a significant past medical history of GERD who presents to the Colon and Rectal Surgery Clinic for an opinion on rectal pain.    Rectal Pain: Reports mainly with orgasms rather than defecation. No bloody stools. No external or internal hemorrhoids. No anal fissures either. Suspect differential includes prostate related pain for what we will recommend urology referral. Other thought if no concern for prostate related pain by urology will be rectal spasm so we will consider pelvic floor testing with EMG. We will also obtain a pelvic MRI with fistula protocol to ruled out any concern for fistula causing his symptoms which seems unlikely but he did have an abnormal MRI in 2012. Patient should return to clinic if urology rules out that his pain is related to his prostate. We will contact patient to discuss MRI findings.    Medical history:  Past Medical History:   Diagnosis Date     Chronic nonallergic rhinitis 5/27/10 skin tests    all negative     Gastroesophageal reflux disease without esophagitis 10/6/2015     GERD (gastroesophageal reflux disease)        Surgical history:  Past Surgical History:   Procedure Laterality Date     APPENDECTOMY  2004     ESOPHAGOSCOPY, GASTROSCOPY, DUODENOSCOPY (EGD), COMBINED N/A 11/9/2018    Procedure: COMBINED ESOPHAGOSCOPY, GASTROSCOPY, DUODENOSCOPY (EGD), BIOPSY SINGLE OR MULTIPLE;  Surgeon: Remy Vigil MD;   Location:  GI     SINUS SURGERY  4/27/10       Problem list:    Patient Active Problem List    Diagnosis Date Noted     Hip pain, bilateral 12/10/2015     Priority: Medium     Gastroesophageal reflux disease without esophagitis 10/06/2015     Priority: Medium     Sacral pain 12/17/2012     Priority: Medium     Unclear etiology. MRI normal. If soft tissue, general surgery.  If orthopedic, start with FSOC.  If normal, consider pain clinic for localized injection if needed/desired in the future.       Urinary frequency 12/17/2012     Priority: Medium     Likely small bladder but need to rule out obstruction.  Pre and post void ultrasound and follow-up with urology if abnormalities.       CARDIOVASCULAR SCREENING; LDL GOAL LESS THAN 160 10/31/2010     Priority: Medium     GERD (gastroesophageal reflux disease) 05/30/2010     Priority: Medium     Chronic nonallergic rhinitis      Priority: Medium     Cervicalgia 04/01/2010     Priority: Medium     Nonallopathic lesion of thoracic region 04/01/2010     Priority: Medium     Problem list name updated by automated process. Provider to review         Medications:  Current Outpatient Medications   Medication Sig Dispense Refill     RaNITidine HCl (ZANTAC PO)        hydrocortisone (WESTCORT) 0.2 % external cream Apply topically 2 times daily (Patient not taking: Reported on 8/9/2019) 15 g 0       Allergies:  Allergies   Allergen Reactions     Amoxicillin      Sulfa Drugs Hives       Family history:  Family History   Problem Relation Age of Onset     Hypertension Father      Cardiovascular Maternal Grandfather      Heart Disease Maternal Grandfather      Allergies Brother        Social history:  Social History     Tobacco Use     Smoking status: Never Smoker     Smokeless tobacco: Never Used   Substance Use Topics     Alcohol use: Yes     Alcohol/week: 0.0 oz     Comment: once a wk     Marital status: .      There are no exam notes on file for this visit.     Total face  to face time was 30 minutes, >50% counseling.    Procedures:  None    Dionisio Kitchen MD  GI Fellow  Colon and Rectal Surgery   [facility]    Shakila Briones APRN CNP

## 2019-08-09 NOTE — PROGRESS NOTES
"Colon and Rectal Surgery Consult Clinic Note    Date: 2019     Referring provider:  Onel Chun PA-C  [address] [facility] [address]  [place] [address]     RE: Emir Angela  : 1985  EAN: 2019    Emir Angela is a very pleasant 34 year old male with a significant past medical history of GERD who presents to the [facility] and [facility]al Surgery Clinic for an opinion on rectal pain. Patient reports this rectal pain is going on for approximately the last year and is mainly with orgasms. He reports his 30% of orgasms he has this type of pain. He also reports pain with some of his bowel movements but this is much more mild. His pain is sharp and in the inside of the rectal area right above the anal canal. Patient denies bloody stools although reports once every now and then in the toilet paper. Patient denies any diarrhea. Reports no urgency or incontinence. He also reports pain with palpation right at the tip of his tail bone but he indicates this pain is different that the rectal pain. In  had a pelvic MRI that showed clinical concern for a rectal fistula. Patient also denies any rectal intercourse, he denies any fevers or chills during this episodes of rectal pain. He reports no pain with urination either.       Physical Examination: Exam was chaperoned by Shakila Martin  /86 (BP Location: Left arm, Patient Position: Sitting, Cuff Size: Adult Regular)   Pulse 63   Ht 5' 10.5\"   Wt 157 lb 3.2 oz   SpO2 99%   BMI 22.24 kg/m    General: Alert and oriented, no distress  HEENT: no scleral icterus  Abdomen: soft non tender and no peritoneal signs   Extremities: no edema   Perianal external examination:  Perianal skin: Intact with no excoriation or lichenification.  Lesions: No evidence of an external lesion, nodularity, or induration in the perianal region.  Eversion of buttocks: There was not evidence of an anal fissure. Details: N/A.  Skin tags or external hemorrhoids: " No.  Digital rectal examination: Was performed.   Sphincter tone: Good.  Palpable lesions: No.  Prostate: Normal. No pain on palpation but slightly boggy prostate  Other: None.    Anoscopy: Was deferred    Assessment/Plan: Emir Angela is a very pleasant 34 year old male with a significant past medical history of GERD who presents to the Colon and Rectal Surgery Clinic for an opinion on rectal pain.    Rectal Pain: Reports mainly with orgasms rather than defecation. No bloody stools. No external or internal hemorrhoids. No anal fissures either. Suspect differential includes prostate related pain for what we will recommend urology referral. Other thought if no concern for prostate related pain by urology will be rectal spasm so we will consider pelvic floor testing with EMG. We will also obtain a pelvic MRI with fistula protocol to ruled out any concern for fistula causing his symptoms which seems unlikely but he did have an abnormal MRI in 2012. Patient should return to clinic if urology rules out that his pain is related to his prostate. We will contact patient to discuss MRI findings.      Medical history:  Past Medical History:   Diagnosis Date     Chronic nonallergic rhinitis 5/27/10 skin tests    all negative     Gastroesophageal reflux disease without esophagitis 10/6/2015     GERD (gastroesophageal reflux disease)        Surgical history:  Past Surgical History:   Procedure Laterality Date     APPENDECTOMY  2004     ESOPHAGOSCOPY, GASTROSCOPY, DUODENOSCOPY (EGD), COMBINED N/A 11/9/2018    Procedure: COMBINED ESOPHAGOSCOPY, GASTROSCOPY, DUODENOSCOPY (EGD), BIOPSY SINGLE OR MULTIPLE;  Surgeon: Remy Vigil MD;  Location:  GI     SINUS SURGERY  4/27/10       Problem list:    Patient Active Problem List    Diagnosis Date Noted     Hip pain, bilateral 12/10/2015     Priority: Medium     Gastroesophageal reflux disease without esophagitis 10/06/2015     Priority: Medium     Sacral pain 12/17/2012      Priority: Medium     Unclear etiology. MRI normal. If soft tissue, general surgery.  If orthopedic, start with FSOC.  If normal, consider pain clinic for localized injection if needed/desired in the future.       Urinary frequency 12/17/2012     Priority: Medium     Likely small bladder but need to rule out obstruction.  Pre and post void ultrasound and follow-up with urology if abnormalities.       CARDIOVASCULAR SCREENING; LDL GOAL LESS THAN 160 10/31/2010     Priority: Medium     GERD (gastroesophageal reflux disease) 05/30/2010     Priority: Medium     Chronic nonallergic rhinitis      Priority: Medium     Cervicalgia 04/01/2010     Priority: Medium     Nonallopathic lesion of thoracic region 04/01/2010     Priority: Medium     Problem list name updated by automated process. Provider to review         Medications:  Current Outpatient Medications   Medication Sig Dispense Refill     RaNITidine HCl (ZANTAC PO)        hydrocortisone (WESTCORT) 0.2 % external cream Apply topically 2 times daily (Patient not taking: Reported on 8/9/2019) 15 g 0       Allergies:  Allergies   Allergen Reactions     Amoxicillin      Sulfa Drugs Hives       Family history:  Family History   Problem Relation Age of Onset     Hypertension Father      Cardiovascular Maternal Grandfather      Heart Disease Maternal Grandfather      Allergies Brother        Social history:  Social History     Tobacco Use     Smoking status: Never Smoker     Smokeless tobacco: Never Used   Substance Use Topics     Alcohol use: Yes     Alcohol/week: 0.0 oz     Comment: once a wk     Marital status: .      There are no exam notes on file for this visit.     Total face to face time was 30 minutes, >50% counseling.    Procedures:  None    Dionisio Kitchen MD  GI Fellow  Colon and Rectal Surgery   [facility]

## 2019-08-12 NOTE — TELEPHONE ENCOUNTER
MEDICAL RECORDS REQUEST   [facility]  Urology Clinic  [address] [place] [address]  PHONE: [phone]  Fax: [phone]        FUTURE VISIT INFORMATION                                                   Emir Angela : 1985 scheduled for future visit at [facility]    APPOINTMENT INFORMATION:    Date: 10/10/19 7:20AM     Provider: Remy Mart MD     Reason for Visit/Diagnosis: Orgasm dysfunction    REFERRAL INFORMATION:    Referring provider:  Shakila Briones     Specialty: APRN CNP     Referring providers clinic:  [facility]     Clinic contact number:  [phone]    RECORDS REQUESTED FOR VISIT                                                     NOTES  STATUS/DETAILS   OFFICE NOTE from referring provider  yes   OFFICE NOTE from other specialist  no   DISCHARGE SUMMARY from hospital  no   DISCHARGE REPORT from the ER  no   OPERATIVE REPORT  no   MEDICATION LIST  no     PRE-VISIT CHECKLIST      Record collection complete Yes- All recs in epic   Image in  PACS - ME Pelvis 19   Appointment appropriately scheduled           (right time/right provider) Yes   MyChart activation Yes   Questionnaire complete If no, please explain: In process      Completed by: Jo Pantoja

## 2019-08-16 ENCOUNTER — ANCILLARY PROCEDURE (OUTPATIENT)
Dept: MRI IMAGING | Facility: CLINIC | Age: 34
End: 2019-08-16
Attending: NURSE PRACTITIONER
Payer: COMMERCIAL

## 2019-08-16 DIAGNOSIS — K62.89 RECTAL PAIN: ICD-10-CM

## 2019-08-16 RX ORDER — GADOBUTROL 604.72 MG/ML
7.5 INJECTION INTRAVENOUS ONCE
Status: COMPLETED | OUTPATIENT
Start: 2019-08-16 | End: 2019-08-16

## 2019-08-16 RX ADMIN — GADOBUTROL 7 ML: 604.72 INJECTION INTRAVENOUS at 19:04

## 2019-08-17 NOTE — DISCHARGE INSTRUCTIONS
MRI Contrast Discharge Instructions    The IV contrast you received today will pass out of your body in your  urine. This will happen in the next 24 hours. You will not feel this process.  Your urine will not change color.    Drink at least 4 extra glasses of water or juice today (unless your doctor  has restricted your fluids). This reduces the stress on your kidneys.  You may take your regular medicines.    If you are on dialysis: It is best to have dialysis today.    If you have a reaction: Most reactions happen right away. If you have  any new symptoms after leaving the hospital (such as hives or swelling),  call your hospital at the correct number below. Or call your family doctor.  If you have breathing distress or wheezing, call 911.    Special instructions: ***    I have read and understand the above information.    Signature:______________________________________ Date:___________    Staff:__________________________________________ Date:___________     Time:__________    [facility] Departments:    ___[place]: [phone]  ___[facility]: [phone]  ___[place]: [phone] ___[facility]: [phone]  ___[facility]: [phone]  ___[facility]: [phone]  ___[phone]  ___[facility]: [phone]  ___[phone]

## 2019-10-02 ENCOUNTER — PRE VISIT (OUTPATIENT)
Dept: UROLOGY | Facility: CLINIC | Age: 34
End: 2019-10-02

## 2019-10-02 NOTE — TELEPHONE ENCOUNTER
Reason for Visit: Consult    Diagnosis: rectal pain after ejaculation, 30% of the time    Orders/Procedures/Records: in system    Contact Patient: n/a    Rooming Requirements: normal      Arlyn Davis LPN  10/02/19  7:49 AM

## 2019-10-10 ENCOUNTER — OFFICE VISIT (OUTPATIENT)
Dept: UROLOGY | Facility: CLINIC | Age: 34
End: 2019-10-10
Attending: NURSE PRACTITIONER
Payer: COMMERCIAL

## 2019-10-10 ENCOUNTER — PRE VISIT (OUTPATIENT)
Dept: UROLOGY | Facility: CLINIC | Age: 34
End: 2019-10-10

## 2019-10-10 VITALS
DIASTOLIC BLOOD PRESSURE: 79 MMHG | SYSTOLIC BLOOD PRESSURE: 115 MMHG | BODY MASS INDEX: 22.19 KG/M2 | WEIGHT: 155 LBS | HEIGHT: 70 IN | HEART RATE: 82 BPM

## 2019-10-10 DIAGNOSIS — N53.19 OTHER EJACULATORY DYSFUNCTION: Primary | ICD-10-CM

## 2019-10-10 ASSESSMENT — ENCOUNTER SYMPTOMS
BACK PAIN: 0
ABDOMINAL PAIN: 0
JOINT SWELLING: 0
NECK MASS: 0
SMELL DISTURBANCE: 1
TASTE DISTURBANCE: 0
VOMITING: 0
TROUBLE SWALLOWING: 0
NAUSEA: 0
SINUS CONGESTION: 1
MUSCLE WEAKNESS: 0
RECTAL PAIN: 1
HOARSE VOICE: 0
STIFFNESS: 0
BLOOD IN STOOL: 0
SINUS PAIN: 0
MYALGIAS: 0
JAUNDICE: 0
BOWEL INCONTINENCE: 0
HEARTBURN: 1
NECK PAIN: 0
CONSTIPATION: 0
MUSCLE CRAMPS: 0
DIARRHEA: 0
SORE THROAT: 1
ARTHRALGIAS: 0
BLOATING: 0

## 2019-10-10 ASSESSMENT — PAIN SCALES - GENERAL: PAINLEVEL: NO PAIN (0)

## 2019-10-10 ASSESSMENT — MIFFLIN-ST. JEOR: SCORE: 1644.33

## 2019-10-10 NOTE — PATIENT INSTRUCTIONS
Please follow up PRN    It was a pleasure meeting with you today.  Thank you for allowing me and my team the privilege of caring for you today.  YOU are the reason we are here, and I truly hope we provided you with the excellent service you deserve.  Please let us know if there is anything else we can do for you so that we can be sure you are leaving completely satisfied with your care experience.

## 2019-10-10 NOTE — LETTER
10/10/2019       RE: Emir Angela  [address] [place] [address]     Dear Colleague,    Thank you for referring your patient, Emir Angela, to the [facility] UROLOGY AND INST FOR PROSTATE AND UROLOGIC CANCERS at [facility]. Please see a copy of my visit note below.    Chief Complaint   Patient presents with     Consult     rectal pain after ejaculation, 30% of the time       Sol Carlso MA    I am seeing Emir Angela in consultation from Shakila Young  for evaluation of ejac dysfunction.    HPI:  Emir Angela is a 34 year old male with history of pain in rectal area after ejaculation, about 10% of the time.  This feels very severe 7-8/10 in pain and last 5min or less.  Occasionally notes some pain with defecation also- about 10% of the time.  No gross hematuria.    MRI 8/16/19                                                                   Impression:   1. No perianal fistula identified in the present study. No  inflammatory changes or abscesses.  2. The prostate is not enlarged. Diffuse decreased T2 signal  throughout the peripheral zone of the prostate. Finding is nonspecific  however could represent inflammatory changes. Consider further  evaluation with prostate MRI for better characterization.      This was the 2nd time he's had a MRI looking for tailbone pain- he's had this a very long time and he has point sensitivity at the tailbone area.    REVIEW OF SYSTEMS:  General: negative  Skin: negative  Eyes: negative  Ears/Nose/Throat: negative  Respiratory: negative  Cardiovascular: negative  Gastrointestinal: negative  Genitourinary: see HPI  Musculoskeletal: negative  Neurologic: negative  Psychiatric: negative  Hematologic/Lymphatic/Immunologic: negative  Endocrine: negative    PAST MEDICAL HX:  Past Medical History:   Diagnosis Date     Chronic nonallergic rhinitis 5/27/10 skin tests    all negative     Gastroesophageal reflux  "disease without esophagitis 10/6/2015     GERD (gastroesophageal reflux disease)        PAST SURG HX:  Past Surgical History:   Procedure Laterality Date     APPENDECTOMY  2004     ESOPHAGOSCOPY, GASTROSCOPY, DUODENOSCOPY (EGD), COMBINED N/A 11/9/2018    Procedure: COMBINED ESOPHAGOSCOPY, GASTROSCOPY, DUODENOSCOPY (EGD), BIOPSY SINGLE OR MULTIPLE;  Surgeon: Remy Vigil MD;  Location:  GI     SINUS SURGERY  4/27/10        FAMILY HX:  Family History   Problem Relation Age of Onset     Hypertension Father      Cardiovascular Maternal Grandfather      Heart Disease Maternal Grandfather      Allergies Brother        SOCIAL HX:  Social History     Tobacco Use     Smoking status: Never Smoker     Smokeless tobacco: Never Used   Substance Use Topics     Alcohol use: Yes     Alcohol/week: 0.0 standard drinks     Comment: once a wk      Drug use: No       MEDICATIONS:  Current Outpatient Medications   Medication Sig     hydrocortisone (WESTCORT) 0.2 % external cream Apply topically 2 times daily     RaNITidine HCl (ZANTAC PO)      No current facility-administered medications for this visit.        ALLERGIES:  Amoxicillin and Sulfa drugs      GENERAL PHYSICAL EXAM:     /79   Pulse 82   Ht 1.778 m (5' 10\")   Wt 70.3 kg (155 lb)   BMI 22.24 kg/m      Constitutional: No acute distress. Well nourished.   PSYCH: normal mood and affect.  NEURO: normal gait, no focal deficits.   EYES: anicteric, EOMI, PERR.  CARDIOPULMONARY: breathing non-labored, pulse regular rate/rhythm, no peripheral edema.  GI: Abdomen soft, non-tender, nondistended, no organomegaly.  MUSCULOSKELETAL: normal limb proportions, no muscle wasting, no contractures.  SKIN: Normal virilized hair distribution, no lesions, warts or rashes over genitalia, abdomen extremities or face.  HEME/LYMPH: no ecchymosis, no lymphadenopathy in groin, no lymphedema.     EXAM:    Prostate exam: 15mg, anodular, nontender.      Imaging/labs:  Lab Results "   Component Value Date    CR 1.14 01/08/2010       ASSESSMENT:     Pain in rectal area with ejaculation.    PLAN:    Discussed with him that symptoms are nonspecific- advised observation.  Could consider pelvic floor physical therapy if symptoms persist.  Declines PT referral at this time.    Reassured him there is no obvious urologic pathology.      Copied cc to Consulting provider Shakila Young         Thank-you for the kind consultation.  Remy Mart MD     Urological Surgeon

## 2019-10-10 NOTE — PROGRESS NOTES
I am seeing Emir Angela in consultation from Shakila Young  for evaluation of ejac dysfunction.    HPI:  Emir Angela is a 34 year old male with history of pain in rectal area after ejaculation, about 10% of the time.  This feels very severe 7-8/10 in pain and last 5min or less.  Occasionally notes some pain with defecation also- about 10% of the time.  No gross hematuria.    MRI 8/16/19                                                                   Impression:   1. No perianal fistula identified in the present study. No  inflammatory changes or abscesses.  2. The prostate is not enlarged. Diffuse decreased T2 signal  throughout the peripheral zone of the prostate. Finding is nonspecific  however could represent inflammatory changes. Consider further  evaluation with prostate MRI for better characterization.      This was the 2nd time he's had a MRI looking for tailbone pain- he's had this a very long time and he has point sensitivity at the tailbone area.    REVIEW OF SYSTEMS:  General: negative  Skin: negative  Eyes: negative  Ears/Nose/Throat: negative  Respiratory: negative  Cardiovascular: negative  Gastrointestinal: negative  Genitourinary: see HPI  Musculoskeletal: negative  Neurologic: negative  Psychiatric: negative  Hematologic/Lymphatic/Immunologic: negative  Endocrine: negative    PAST MEDICAL HX:  Past Medical History:   Diagnosis Date     Chronic nonallergic rhinitis 5/27/10 skin tests    all negative     Gastroesophageal reflux disease without esophagitis 10/6/2015     GERD (gastroesophageal reflux disease)        PAST SURG HX:  Past Surgical History:   Procedure Laterality Date     APPENDECTOMY  2004     ESOPHAGOSCOPY, GASTROSCOPY, DUODENOSCOPY (EGD), COMBINED N/A 11/9/2018    Procedure: COMBINED ESOPHAGOSCOPY, GASTROSCOPY, DUODENOSCOPY (EGD), BIOPSY SINGLE OR MULTIPLE;  Surgeon: Remy Vigil MD;  Location:  GI     SINUS SURGERY  4/27/10        FAMILY HX:  Family  "History   Problem Relation Age of Onset     Hypertension Father      Cardiovascular Maternal Grandfather      Heart Disease Maternal Grandfather      Allergies Brother        SOCIAL HX:  Social History     Tobacco Use     Smoking status: Never Smoker     Smokeless tobacco: Never Used   Substance Use Topics     Alcohol use: Yes     Alcohol/week: 0.0 standard drinks     Comment: once a wk      Drug use: No       MEDICATIONS:  Current Outpatient Medications   Medication Sig     hydrocortisone (WESTCORT) 0.2 % external cream Apply topically 2 times daily     RaNITidine HCl (ZANTAC PO)      No current facility-administered medications for this visit.        ALLERGIES:  Amoxicillin and Sulfa drugs      GENERAL PHYSICAL EXAM:     /79   Pulse 82   Ht 1.778 m (5' 10\")   Wt 70.3 kg (155 lb)   BMI 22.24 kg/m     Constitutional: No acute distress. Well nourished.   PSYCH: normal mood and affect.  NEURO: normal gait, no focal deficits.   EYES: anicteric, EOMI, PERR.  CARDIOPULMONARY: breathing non-labored, pulse regular rate/rhythm, no peripheral edema.  GI: Abdomen soft, non-tender, nondistended, no organomegaly.  MUSCULOSKELETAL: normal limb proportions, no muscle wasting, no contractures.  SKIN: Normal virilized hair distribution, no lesions, warts or rashes over genitalia, abdomen extremities or face.  HEME/LYMPH: no ecchymosis, no lymphadenopathy in groin, no lymphedema.     EXAM:    Prostate exam: 15mg, anodular, nontender.      Imaging/labs:  Lab Results   Component Value Date    CR 1.14 01/08/2010       ASSESSMENT:     Pain in rectal area with ejaculation.    PLAN:    Discussed with him that symptoms are nonspecific- advised observation.  Could consider pelvic floor physical therapy if symptoms persist.  Declines PT referral at this time.    Reassured him there is no obvious urologic pathology.      Copied cc to Consulting provider Shakila Young         Thank-you for the kind " consultation.  Remy Mart MD     Urological Surgeon

## 2019-10-10 NOTE — PROGRESS NOTES
Chief Complaint   Patient presents with     Consult     rectal pain after ejaculation, 30% of the time       Sol Carlos MA

## 2019-11-15 ENCOUNTER — OFFICE VISIT (OUTPATIENT)
Dept: FAMILY MEDICINE | Facility: CLINIC | Age: 34
End: 2019-11-15
Payer: COMMERCIAL

## 2019-11-15 VITALS
TEMPERATURE: 99.4 F | HEART RATE: 89 BPM | HEIGHT: 70 IN | SYSTOLIC BLOOD PRESSURE: 121 MMHG | DIASTOLIC BLOOD PRESSURE: 82 MMHG | OXYGEN SATURATION: 99 % | WEIGHT: 155.3 LBS | BODY MASS INDEX: 22.23 KG/M2

## 2019-11-15 DIAGNOSIS — J01.00 ACUTE NON-RECURRENT MAXILLARY SINUSITIS: Primary | ICD-10-CM

## 2019-11-15 LAB
DEPRECATED S PYO AG THROAT QL EIA: NORMAL
SPECIMEN SOURCE: NORMAL

## 2019-11-15 PROCEDURE — 99213 OFFICE O/P EST LOW 20 MIN: CPT | Performed by: FAMILY MEDICINE

## 2019-11-15 PROCEDURE — 87880 STREP A ASSAY W/OPTIC: CPT | Performed by: FAMILY MEDICINE

## 2019-11-15 PROCEDURE — 87081 CULTURE SCREEN ONLY: CPT | Performed by: FAMILY MEDICINE

## 2019-11-15 RX ORDER — AZITHROMYCIN 250 MG/1
TABLET, FILM COATED ORAL
Qty: 6 TABLET | Refills: 0 | Status: ON HOLD | OUTPATIENT
Start: 2019-11-15 | End: 2020-05-20

## 2019-11-15 ASSESSMENT — MIFFLIN-ST. JEOR: SCORE: 1645.69

## 2019-11-15 NOTE — PROGRESS NOTES
Subjective     Emir Angela is a 34 year old male who presents to clinic today for the following health issues:    HPI   RESPIRATORY SYMPTOMS      Duration: 1 month    Description  nasal congestion, sore throat, cough, fever, chills, fatigue/malaise and hoarse voice    Severity: mild    Accompanying signs and symptoms: None    History (predisposing factors):  none    Precipitating or alleviating factors: None    Therapies tried and outcome:  rest and fluids OTC NSAID  Feeling significantly worse.   Sinus pressure and inability to breath from his nose.   Symptoms are fluctuating x 1 month.     Patient Active Problem List   Diagnosis     Cervicalgia     Nonallopathic lesion of thoracic region     Chronic nonallergic rhinitis     GERD (gastroesophageal reflux disease)     CARDIOVASCULAR SCREENING; LDL GOAL LESS THAN 160     Sacral pain     Urinary frequency     Gastroesophageal reflux disease without esophagitis     Hip pain, bilateral     Past Surgical History:   Procedure Laterality Date     APPENDECTOMY  2004     ESOPHAGOSCOPY, GASTROSCOPY, DUODENOSCOPY (EGD), COMBINED N/A 11/9/2018    Procedure: COMBINED ESOPHAGOSCOPY, GASTROSCOPY, DUODENOSCOPY (EGD), BIOPSY SINGLE OR MULTIPLE;  Surgeon: Remy Vigil MD;  Location:  GI     SINUS SURGERY  4/27/10       Social History     Tobacco Use     Smoking status: Never Smoker     Smokeless tobacco: Never Used   Substance Use Topics     Alcohol use: Yes     Alcohol/week: 0.0 standard drinks     Comment: once a wk      Family History   Problem Relation Age of Onset     Hypertension Father      Cardiovascular Maternal Grandfather      Heart Disease Maternal Grandfather      Allergies Brother          Reviewed and updated as needed this visit by Provider         Review of Systems   ROS COMP: Constitutional, HEENT, cardiovascular, pulmonary, gi and gu systems are negative, except as otherwise noted.      Objective    /82   Pulse 89   Temp 99.4  F (37.4  C)  "(Oral)   Ht 1.778 m (5' 10\")   Wt 70.4 kg (155 lb 4.8 oz)   SpO2 99%   BMI 22.28 kg/m    Body mass index is 22.28 kg/m .  Physical Exam   GENERAL: healthy, alert and no distress  EYES: Eyes grossly normal to inspection, PERRL and conjunctivae and sclerae normal  HENT: ear canals and TM's normal, nose and mouth without ulcers or lesions  NECK: no adenopathy, no asymmetry, masses, or scars and thyroid normal to palpation  RESP: lungs clear to auscultation - no rales, rhonchi or wheezes  CV: regular rate and rhythm, normal S1 S2, no S3 or S4, no murmur, click or rub, no peripheral edema and peripheral pulses strong  MS: no gross musculoskeletal defects noted, no edema    Diagnostic Test Results:  Labs reviewed in Epic  No results found for this or any previous visit (from the past 24 hour(s)).        Assessment & Plan       ICD-10-CM    1. Acute non-recurrent maxillary sinusitis J01.00 Pseudoephedrine-DM-[id number] (DAY TIME COLD/FLU OR)     Strep, Rapid Screen     Beta strep group A culture     azithromycin (ZITHROMAX) 250 MG tablet       Return in about 1 week (around 11/22/2019) for Return to clinic if you get worse..    Juju Aguilar MD  [facility]        "

## 2019-11-15 NOTE — PATIENT INSTRUCTIONS
Patient Education     Acute Sinusitis    Acute sinusitis is irritation and swelling of the sinuses. It is usually caused by a viral infection after a common cold. Your doctor can help you find relief.  What is acute sinusitis?  Sinuses are air-filled spaces in the skull behind the face. They are kept moist and clean by a lining of mucosa. Things such as pollen, smoke, and chemical fumes can irritate the mucosa. It can then swell up. As a response to irritation, the mucosa makes more mucus and other fluids. Tiny hairlike cilia cover the mucosa. Cilia help carry mucus toward the opening of the sinus. Too much mucus may cause the cilia to stop working. This blocks the sinus opening. A buildup of fluid in the sinuses then causes pain and pressure. It can also encourage bacteria to grow in the sinuses.  Common symptoms of acute sinusitis  You may have:    Facial soreness pain    Headache    Fever    Fluid draining in the back of the throat (postnasal drip)    Congestion    Drainage that is thick and colored, instead of clear    Cough  Diagnosing acute sinusitis  Your doctor will ask about your symptoms and health history. He or she will look at your ear, nose, and throat. You usually won't need to have X-rays taken.    The doctor may take a sample of mucus to check for bacteria. If you have sinusitis that keeps coming back, you may need imaging tests such as X-rays or CAT scans. This will help your doctor check for a structural problem that may be causing the infection.  Treating acute sinusitis  Treatment is aimed at unblocking the sinus opening and helping the cilia work again. You may need to take antihistamine and decongestant medicine. These can reduce inflammation and decrease the amount of fluid your sinuses make. If you have a bacterial infection, you will need to take antibiotic medicine for 10 to 14 days. Take this medicine until it is gone, even if you feel better.  Date Last Reviewed: 10/1/2016    [id number]  [employer]. [address] [place] [address], [place] [address]. All rights reserved. This information is not intended as a substitute for professional medical care. Always follow your healthcare professional's instructions.

## 2019-11-16 LAB
BACTERIA SPEC CULT: NORMAL
SPECIMEN SOURCE: NORMAL

## 2019-12-16 ENCOUNTER — ANCILLARY PROCEDURE (OUTPATIENT)
Dept: GENERAL RADIOLOGY | Facility: CLINIC | Age: 34
End: 2019-12-16
Attending: FAMILY MEDICINE
Payer: COMMERCIAL

## 2019-12-16 ENCOUNTER — OFFICE VISIT (OUTPATIENT)
Dept: ORTHOPEDICS | Facility: CLINIC | Age: 34
End: 2019-12-16
Payer: COMMERCIAL

## 2019-12-16 VITALS
DIASTOLIC BLOOD PRESSURE: 83 MMHG | BODY MASS INDEX: 22.19 KG/M2 | HEIGHT: 70 IN | WEIGHT: 155 LBS | SYSTOLIC BLOOD PRESSURE: 134 MMHG

## 2019-12-16 DIAGNOSIS — G89.29 CHRONIC SHOULDER PAIN: ICD-10-CM

## 2019-12-16 DIAGNOSIS — M25.511 CHRONIC RIGHT SHOULDER PAIN: Primary | ICD-10-CM

## 2019-12-16 DIAGNOSIS — G89.29 CHRONIC RIGHT SHOULDER PAIN: Primary | ICD-10-CM

## 2019-12-16 DIAGNOSIS — M25.519 CHRONIC SHOULDER PAIN: ICD-10-CM

## 2019-12-16 PROCEDURE — 99214 OFFICE O/P EST MOD 30 MIN: CPT | Performed by: FAMILY MEDICINE

## 2019-12-16 PROCEDURE — 73030 X-RAY EXAM OF SHOULDER: CPT | Mod: RT | Performed by: RADIOLOGY

## 2019-12-16 ASSESSMENT — MIFFLIN-ST. JEOR: SCORE: 1644.33

## 2019-12-16 NOTE — LETTER
"    12/16/2019         RE: Emir Angela  [address] [place] [address]        Dear Colleague,    Thank you for referring your patient, Emir Angela, to the [facility]. Please see a copy of my visit note below.          [facility]  12/16/2019    Emir Angela's chief complaint for this visit includes:  Chief Complaint   Patient presents with     Consult     right shoulder pain, chronic pain for over 10 years, no recent images, painful when sleeping.      PCP: [facility]    Referring Provider:  No referring provider defined for this encounter.    /83 (BP Location: Right arm, Patient Position: Sitting)   Ht 1.778 m (5' 10\")   Wt 70.3 kg (155 lb)   BMI 22.24 kg/m         Reason for visit:     What part of your body is injured / painful?  right shoulder    What caused the injury /pain? No inciting event     How long ago did your injury occur or pain begin? problem is longstanding    What are your most bothersome symptoms? Pain    How would you characterize your symptom?  aching    What makes your symptoms better? Rest    What makes your symptoms worse? Standing    Have you been previously seen for this problem? No    Medical History:    Any recent changes to your medical history? No    Any new medication prescribed since last visit? No    Have you had surgery on this body part before? No    Social History:    Occupation:      Handedness: Right      Review of Systems:    Do you have fever, chills, weight loss? No    Do you have any vision problems? No    Do you have any chest pain or edema? No    Do you have any shortness of breath or wheezing?  No    Do you have stomach problems? No    Do you have any numbness or focal weakness? No    Do you have diabetes? No    Do you have problems with bleeding or clotting? No    Do you have an rashes or other skin lesions? No          CHIEF COMPLAINT:  Consult (right shoulder pain, " chronic pain for over 10 years, no recent images, painful when sleeping. )       HISTORY OF PRESENT ILLNESS  Mr. Angela is a pleasant 34 year old year old male who presents to clinic today with right shoulder pain.      Fadi has had right shoulder pain for the past 10 to 12 years.  Pain is achy, he points to his general shoulder region anteriorly and laterally.  This is his dominant arm.  He has been experiencing multiple subluxation episodes in which his shoulder will feel as if it is going to slip out.  This is mostly when engaging in activities such as disc golf, rockclimbing, and throwing with an overhead motion.  He denies any numbness or tingling throughout his arm or hand.  He has been seen for this in the past and had had an x-ray years ago.  He has also tried therapeutic exercises and oral analgesics, both of which have been intermittently helpful.      Additional history: as documented    MEDICAL HISTORY  Patient Active Problem List   Diagnosis     Cervicalgia     Nonallopathic lesion of thoracic region     Chronic nonallergic rhinitis     GERD (gastroesophageal reflux disease)     CARDIOVASCULAR SCREENING; LDL GOAL LESS THAN 160     Sacral pain     Urinary frequency     Gastroesophageal reflux disease without esophagitis     Hip pain, bilateral       Current Outpatient Medications   Medication Sig Dispense Refill     azithromycin (ZITHROMAX) 250 MG tablet Two tablets first day, then one tablet daily for four days. 6 tablet 0     hydrocortisone (WESTCORT) 0.2 % external cream Apply topically 2 times daily (Patient not taking: Reported on 11/15/2019) 15 g 0     Pseudoephedrine-DM-GG-APAP (DAY TIME COLD/FLU OR)        RaNITidine HCl (ZANTAC PO)          Allergies   Allergen Reactions     Amoxicillin      Sulfa Drugs Hives       Family History   Problem Relation Age of Onset     Hypertension Father      Cardiovascular Maternal Grandfather      Heart Disease Maternal Grandfather      Allergies Brother   "      Additional medical/Social/Surgical histories reviewed in [facility] and updated as appropriate.          PHYSICAL EXAM  Vitals:    12/16/19 0838   BP: 134/83   BP Location: Right arm   Patient Position: Sitting   Weight: 70.3 kg (155 lb)   Height: 1.778 m (5' 10\")     General  - normal appearance, in no obvious distress  CV  - normal radial pulse  Pulm  - normal respiratory pattern, non-labored  Musculoskeletal - right shoulder  - inspection: normal bone and joint alignment, no obvious deformity, no scapular winging, no AC step-off  - palpation: no bony or soft tissue tenderness, normal clavicle, non-tender AC  - ROM: FROM, mild pain at end range flexion, abduction  - strength: 5/5  strength, 5/5 in all shoulder planes  - special tests:  (-) Speed's  (-) Neer  (-) Hawkin's  (-) Marie's  (+) Herscher's  (+) load & shift, supine  (weakly +) apprehension  (weakly +) subscap lift-off    Neuro  - no sensory or motor deficit, grossly normal coordination, normal muscle tone  Skin  - no ecchymosis, erythema, warmth, or induration, no obvious rash  Psych  - interactive, appropriate, normal mood and affect         ASSESSMENT & PLAN  Mr. Angela is a 34 year old year old male who presents to clinic today with chronic right shoulder pain.    I ordered and reviewed an x-ray of his shoulder which shows some mild enthesopathic change of the rotator cuff interval at the humerus.  There is also some thickening of the inferior aspect of the humeral head on AP view.    His symptoms and x-ray are suggestive of glenohumeral pathology, a possible rotator cuff pathology, or both.    After some discussion I am ordering an MR arthrogram of the shoulder.  He can get this done at his earliest convenience.    It was a pleasure seeing Elyssa Curtis DO, [facility]  Primary Care Sports Medicine       This note was constructed using Dragon dictation software, please excuse any minor errors in spelling, grammar, or syntax.      Again, thank you " for allowing me to participate in the care of your patient.        Sincerely,        René Curtis, DO

## 2019-12-16 NOTE — PROGRESS NOTES
"      [facility]  12/16/2019    Emir Angela's chief complaint for this visit includes:  Chief Complaint   Patient presents with     Consult     right shoulder pain, chronic pain for over 10 years, no recent images, painful when sleeping.      PCP: [facility]    Referring Provider:  No referring provider defined for this encounter.    /83 (BP Location: Right arm, Patient Position: Sitting)   Ht 1.778 m (5' 10\")   Wt 70.3 kg (155 lb)   BMI 22.24 kg/m        Reason for visit:     What part of your body is injured / painful?  right shoulder    What caused the injury /pain? No inciting event     How long ago did your injury occur or pain begin? problem is longstanding    What are your most bothersome symptoms? Pain    How would you characterize your symptom?  aching    What makes your symptoms better? Rest    What makes your symptoms worse? Standing    Have you been previously seen for this problem? No    Medical History:    Any recent changes to your medical history? No    Any new medication prescribed since last visit? No    Have you had surgery on this body part before? No    Social History:    Occupation:      Handedness: Right      Review of Systems:    Do you have fever, chills, weight loss? No    Do you have any vision problems? No    Do you have any chest pain or edema? No    Do you have any shortness of breath or wheezing?  No    Do you have stomach problems? No    Do you have any numbness or focal weakness? No    Do you have diabetes? No    Do you have problems with bleeding or clotting? No    Do you have an rashes or other skin lesions? No          CHIEF COMPLAINT:  Consult (right shoulder pain, chronic pain for over 10 years, no recent images, painful when sleeping. )       HISTORY OF PRESENT ILLNESS  Mr. Angela is a pleasant 34 year old year old male who presents to clinic today with right shoulder pain.      Fadi has had right shoulder pain for the " "past 10 to 12 years.  Pain is achy, he points to his general shoulder region anteriorly and laterally.  This is his dominant arm.  He has been experiencing multiple subluxation episodes in which his shoulder will feel as if it is going to slip out.  This is mostly when engaging in activities such as disc golf, rockclimbing, and throwing with an overhead motion.  He denies any numbness or tingling throughout his arm or hand.  He has been seen for this in the past and had had an x-ray years ago.  He has also tried therapeutic exercises and oral analgesics, both of which have been intermittently helpful.      Additional history: as documented    MEDICAL HISTORY  Patient Active Problem List   Diagnosis     Cervicalgia     Nonallopathic lesion of thoracic region     Chronic nonallergic rhinitis     GERD (gastroesophageal reflux disease)     CARDIOVASCULAR SCREENING; LDL GOAL LESS THAN 160     Sacral pain     Urinary frequency     Gastroesophageal reflux disease without esophagitis     Hip pain, bilateral       Current Outpatient Medications   Medication Sig Dispense Refill     azithromycin (ZITHROMAX) 250 MG tablet Two tablets first day, then one tablet daily for four days. 6 tablet 0     hydrocortisone (WESTCORT) 0.2 % external cream Apply topically 2 times daily (Patient not taking: Reported on 11/15/2019) 15 g 0     Pseudoephedrine-DM-GG-APAP (DAY TIME COLD/FLU OR)        RaNITidine HCl (ZANTAC PO)          Allergies   Allergen Reactions     Amoxicillin      Sulfa Drugs Hives       Family History   Problem Relation Age of Onset     Hypertension Father      Cardiovascular Maternal Grandfather      Heart Disease Maternal Grandfather      Allergies Brother        Additional medical/Social/Surgical histories reviewed in [facility] and updated as appropriate.          PHYSICAL EXAM  Vitals:    12/16/19 0838   BP: 134/83   BP Location: Right arm   Patient Position: Sitting   Weight: 70.3 kg (155 lb)   Height: 1.778 m (5' 10\") "     General  - normal appearance, in no obvious distress  CV  - normal radial pulse  Pulm  - normal respiratory pattern, non-labored  Musculoskeletal - right shoulder  - inspection: normal bone and joint alignment, no obvious deformity, no scapular winging, no AC step-off  - palpation: no bony or soft tissue tenderness, normal clavicle, non-tender AC  - ROM: FROM, mild pain at end range flexion, abduction  - strength: 5/5  strength, 5/5 in all shoulder planes  - special tests:  (-) Speed's  (-) Neer  (-) Hawkin's  (-) Marie's  (+) Baca's  (+) load & shift, supine  (weakly +) apprehension  (weakly +) subscap lift-off    Neuro  - no sensory or motor deficit, grossly normal coordination, normal muscle tone  Skin  - no ecchymosis, erythema, warmth, or induration, no obvious rash  Psych  - interactive, appropriate, normal mood and affect         ASSESSMENT & PLAN  Mr. Angela is a 34 year old year old male who presents to clinic today with chronic right shoulder pain.    I ordered and reviewed an x-ray of his shoulder which shows some mild enthesopathic change of the rotator cuff interval at the humerus.  There is also some thickening of the inferior aspect of the humeral head on AP view.    His symptoms and x-ray are suggestive of glenohumeral pathology, a possible rotator cuff pathology, or both.    After some discussion I am ordering an MR arthrogram of the shoulder.  He can get this done at his earliest convenience.    It was a pleasure seeing Elyssa Curtis DO, [facility]  Primary Care Sports Medicine       This note was constructed using Dragon dictation software, please excuse any minor errors in spelling, grammar, or syntax.

## 2019-12-20 ENCOUNTER — TRANSFERRED RECORDS (OUTPATIENT)
Dept: HEALTH INFORMATION MANAGEMENT | Facility: CLINIC | Age: 34
End: 2019-12-20

## 2019-12-23 ENCOUNTER — TELEPHONE (OUTPATIENT)
Dept: ORTHOPEDICS | Facility: CLINIC | Age: 34
End: 2019-12-23

## 2019-12-23 NOTE — TELEPHONE ENCOUNTER
Dr. Curtis called Pt and reviewed MR results. He is recommending Pt see Dr. Suarez here in [place]. Will have our procedure  reach out to assist with this.    Chato Martinez RN

## 2019-12-23 NOTE — TELEPHONE ENCOUNTER
12/23 Called and left voicemail. Provided phone number [phone] to schedule an appointment with Dr. Suarez.     Mary Zamora   Procedure    Ortho/Sports Med/Ent/Eye   [facility]   [phone]

## 2019-12-26 NOTE — TELEPHONE ENCOUNTER
12/26 Called and left voicemail. Provided phone number [phone] to schedule an appointment with Dr. Suarez.      Mary Zamora          Procedure    Ortho/Sports Med/Ent/Eye   [facility] [place]   [phone]

## 2020-01-13 ENCOUNTER — OFFICE VISIT (OUTPATIENT)
Dept: ORTHOPEDICS | Facility: CLINIC | Age: 35
End: 2020-01-13
Payer: COMMERCIAL

## 2020-01-13 VITALS
BODY MASS INDEX: 21.49 KG/M2 | HEART RATE: 79 BPM | SYSTOLIC BLOOD PRESSURE: 119 MMHG | DIASTOLIC BLOOD PRESSURE: 78 MMHG | WEIGHT: 153.5 LBS | HEIGHT: 71 IN | OXYGEN SATURATION: 96 %

## 2020-01-13 DIAGNOSIS — M67.919 DISORDER OF BURSAE AND TENDONS IN SHOULDER REGION: ICD-10-CM

## 2020-01-13 DIAGNOSIS — M75.111 INCOMPLETE TEAR OF RIGHT ROTATOR CUFF, UNSPECIFIED WHETHER TRAUMATIC: Primary | ICD-10-CM

## 2020-01-13 DIAGNOSIS — M71.9 DISORDER OF BURSAE AND TENDONS IN SHOULDER REGION: ICD-10-CM

## 2020-01-13 PROCEDURE — 99203 OFFICE O/P NEW LOW 30 MIN: CPT | Performed by: ORTHOPAEDIC SURGERY

## 2020-01-13 ASSESSMENT — PAIN SCALES - GENERAL: PAINLEVEL: NO PAIN (0)

## 2020-01-13 ASSESSMENT — MIFFLIN-ST. JEOR: SCORE: 1645.46

## 2020-01-13 NOTE — LETTER
1/13/2020         RE: Emir Angela  [address] [place] [address]        Dear Colleague,    Thank you for referring your patient, Emir Angela, to the [facility]. Please see a copy of my visit note below.    CHIEF CONCERN:  Chronic pain/instability of right shoulder    HISTORY OF PRESENT ILLNESS:  Emir is a pleasant 34 year old right hand dominant man who presents to the clinic for the above complaint.  He originally had a subluxation injury of his right shoulder 15 years ago playing ultimate frisbee.  He did physical therapy at that time which helped.  Since then he has had 5 subluxation events.  He has difficulty with sleep.  He is a climber and plays volley ball, ultimate frisbee and disc golf.  He is having difficulty with pain during these activities.  He works as an  at [employer].  He would like to discuss surgical options versus physical therapy.    Past Medical History:   Diagnosis Date     Chronic nonallergic rhinitis 5/27/10 skin tests    all negative     Gastroesophageal reflux disease without esophagitis 10/6/2015     GERD (gastroesophageal reflux disease)        Past Surgical History:   Procedure Laterality Date     APPENDECTOMY  2004     ESOPHAGOSCOPY, GASTROSCOPY, DUODENOSCOPY (EGD), COMBINED N/A 11/9/2018    Procedure: COMBINED ESOPHAGOSCOPY, GASTROSCOPY, DUODENOSCOPY (EGD), BIOPSY SINGLE OR MULTIPLE;  Surgeon: Remy Vigil MD;  Location:  GI     SINUS SURGERY  4/27/10       Current Outpatient Medications   Medication Sig Dispense Refill     azithromycin (ZITHROMAX) 250 MG tablet Two tablets first day, then one tablet daily for four days. 6 tablet 0     hydrocortisone (WESTCORT) 0.2 % external cream Apply topically 2 times daily (Patient not taking: Reported on 11/15/2019) 15 g 0     Pseudoephedrine-DM-GG-APAP (DAY TIME COLD/FLU OR)        RaNITidine HCl (ZANTAC PO)             Allergies   Allergen Reactions     Amoxicillin       Sulfa Drugs Hives       SOCIAL HISTORY:    Social History     Socioeconomic History     Marital status:      Spouse name: Not on file     Number of children: Not on file     Years of education: Not on file     Highest education level: Not on file   Occupational History     Not on file   Social Needs     Financial resource strain: Not on file     Food insecurity:     Worry: Not on file     Inability: Not on file     Transportation needs:     Medical: Not on file     Non-medical: Not on file   Tobacco Use     Smoking status: Never Smoker     Smokeless tobacco: Never Used   Substance and Sexual Activity     Alcohol use: Yes     Alcohol/week: 0.0 standard drinks     Comment: once a wk      Drug use: No     Sexual activity: Yes     Partners: Female     Birth control/protection: Condom   Lifestyle     Physical activity:     Days per week: Not on file     Minutes per session: Not on file     Stress: Not on file   Relationships     Social connections:     Talks on phone: Not on file     Gets together: Not on file     Attends Worship service: Not on file     Active member of club or organization: Not on file     Attends meetings of clubs or organizations: Not on file     Relationship status: Not on file     Intimate partner violence:     Fear of current or ex partner: Not on file     Emotionally abused: Not on file     Physically abused: Not on file     Forced sexual activity: Not on file   Other Topics Concern     Parent/sibling w/ CABG, MI or angioplasty before 65F 55M? 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 Articulates and communicates with normal affect.  Respirations even and unlabored  Focused upper extremity exam: Skin intact. No  erythema. Sensation intact all dermatomes into the hand to light touch. EPL, FPL, and Intrinsics intact. Right shoulder active motion is FE to 180, ER at side to 80, and IR to T12. Left shoulder active motion is FE to 185, ER to 90, and IR to T6.  Negative Neer and Betts. No pain on palpation over the AC joint. Mild pain on palpation over the long head of the biceps. On the right, mildly uncomfortable Speeds. OBriens is frankly positive and reproduces the patient's pain. Negative empty can with strength 5/5.    IMAGING:  Right shoulder MRI at [facility] 12/20/19 was reviewed with radiology report below      ASSESSMENT:    1. Right anterior high grade with focal full thickness supra tear  2. Right SLAP tear    PLAN:  I reviewed the imaging and exam findings with the patient and we discussed both nonoperative and operative treatment options. I discussed the cuff tear and SLAP tear components of the imaging and the exam correlation primarily to the SLAP tear or proximal biceps as a more significant pain generator. We discussed risks and benefits of both nonop and operative treatment. The patient understands this discussion. Going to continue with nonoperative management at present. If symptoms worsen or fail to improve he should feel free to discuss next steps in management.     Sada Suarez MD      Again, thank you for allowing me to participate in the care of your patient.        Sincerely,        Sada Suarez MD

## 2020-01-13 NOTE — PATIENT INSTRUCTIONS
Thanks for coming today.  Ortho/Sports Medicine Clinic  [address] [place] [address]    To schedule future appointments in Ortho Clinic, you may call [phone].    To schedule ordered imaging by your provider:   Call Central Imaging [phone]    To schedule an injection ordered by your provider:  Call Central Imaging Injection scheduling line: [phone]  Workspacehart available online at:  [email or web address]    Please call if any further questions or concerns ([phone]).  Clinic hours 8 am to 5 pm.    Return to clinic (call) if symptoms worsen or fail to improve.

## 2020-01-13 NOTE — PROGRESS NOTES
CHIEF CONCERN:  Chronic pain/instability of right shoulder    HISTORY OF PRESENT ILLNESS:  Emir is a pleasant 34 year old right hand dominant man who presents to the clinic for the above complaint.  He originally had a subluxation injury of his right shoulder 15 years ago playing ultimate frisbee.  He did physical therapy at that time which helped.  Since then he has had 5 subluxation events.  He has difficulty with sleep.  He is a climber and plays volley ball, ultimate frisbee and disc golf.  He is having difficulty with pain during these activities.  He works as an  at [employer].  He would like to discuss surgical options versus physical therapy.    Past Medical History:   Diagnosis Date     Chronic nonallergic rhinitis 5/27/10 skin tests    all negative     Gastroesophageal reflux disease without esophagitis 10/6/2015     GERD (gastroesophageal reflux disease)        Past Surgical History:   Procedure Laterality Date     APPENDECTOMY  2004     ESOPHAGOSCOPY, GASTROSCOPY, DUODENOSCOPY (EGD), COMBINED N/A 11/9/2018    Procedure: COMBINED ESOPHAGOSCOPY, GASTROSCOPY, DUODENOSCOPY (EGD), BIOPSY SINGLE OR MULTIPLE;  Surgeon: Remy Vigil MD;  Location:  GI     SINUS SURGERY  4/27/10       Current Outpatient Medications   Medication Sig Dispense Refill     azithromycin (ZITHROMAX) 250 MG tablet Two tablets first day, then one tablet daily for four days. 6 tablet 0     hydrocortisone (WESTCORT) 0.2 % external cream Apply topically 2 times daily (Patient not taking: Reported on 11/15/2019) 15 g 0     Pseudoephedrine-DM-GG-APAP (DAY TIME COLD/FLU OR)        RaNITidine HCl (ZANTAC PO)             Allergies   Allergen Reactions     Amoxicillin      Sulfa Drugs Hives       SOCIAL HISTORY:    Social History     Socioeconomic History     Marital status:      Spouse name: Not on file     Number of children: Not on file     Years of education: Not on file     Highest education level: Not on file    Occupational History     Not on file   Social Needs     Financial resource strain: Not on file     Food insecurity:     Worry: Not on file     Inability: Not on file     Transportation needs:     Medical: Not on file     Non-medical: Not on file   Tobacco Use     Smoking status: Never Smoker     Smokeless tobacco: Never Used   Substance and Sexual Activity     Alcohol use: Yes     Alcohol/week: 0.0 standard drinks     Comment: once a wk      Drug use: No     Sexual activity: Yes     Partners: Female     Birth control/protection: Condom   Lifestyle     Physical activity:     Days per week: Not on file     Minutes per session: Not on file     Stress: Not on file   Relationships     Social connections:     Talks on phone: Not on file     Gets together: Not on file     Attends Judaism service: Not on file     Active member of club or organization: Not on file     Attends meetings of clubs or organizations: Not on file     Relationship status: Not on file     Intimate partner violence:     Fear of current or ex partner: Not on file     Emotionally abused: Not on file     Physically abused: Not on file     Forced sexual activity: Not on file   Other Topics Concern     Parent/sibling w/ CABG, MI or angioplasty before 65F 55M? 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 Articulates and communicates with normal affect.  Respirations even and unlabored  Focused upper extremity exam: Skin intact. No erythema. Sensation intact all dermatomes into the hand to light touch. EPL, FPL, and Intrinsics intact. Right shoulder active motion is FE to 180, ER at side to 80, and IR to T12. Left shoulder active motion is FE to 185, ER to 90, and IR to T6.  Negative  Jossue and Betts. No pain on palpation over the AC joint. Mild pain on palpation over the long head of the biceps. On the right, mildly uncomfortable Speeds. OBriens is frankly positive and reproduces the patient's pain. Negative empty can with strength 5/5.    IMAGING:  Right shoulder MRI at [facility] 12/20/19 was reviewed with radiology report below      ASSESSMENT:    1. Right anterior high grade with focal full thickness supra tear  2. Right SLAP tear    PLAN:  I reviewed the imaging and exam findings with the patient and we discussed both nonoperative and operative treatment options. I discussed the cuff tear and SLAP tear components of the imaging and the exam correlation primarily to the SLAP tear or proximal biceps as a more significant pain generator. We discussed risks and benefits of both nonop and operative treatment. The patient understands this discussion. Going to continue with nonoperative management at present. If symptoms worsen or fail to improve he should feel free to discuss next steps in management.     Sada Suarez MD

## 2020-01-13 NOTE — NURSING NOTE
"Emir Angela's chief complaint for this visit includes:  Chief Complaint   Patient presents with     Right Shoulder - Pain     Labral tear 15 years ago playing ultimate frisbee.     [facility]    Referring Provider:  No referring provider defined for this encounter.    /78 (BP Location: Left arm, Patient Position: Sitting, Cuff Size: Adult Regular)   Pulse 79   Ht 1.791 m (5' 10.5\")   Wt 69.6 kg (153 lb 8 oz)   SpO2 96%   BMI 21.71 kg/m    No Pain (0)     Do you need any medication refills at today's visit? No    Right hand dominant    Selina Jones CMA        "

## 2020-03-01 ENCOUNTER — HEALTH MAINTENANCE LETTER (OUTPATIENT)
Age: 35
End: 2020-03-01

## 2020-03-18 ENCOUNTER — VIRTUAL VISIT (OUTPATIENT)
Dept: FAMILY MEDICINE | Facility: OTHER | Age: 35
End: 2020-03-18

## 2020-03-20 NOTE — PROGRESS NOTES
"Date: 2020 12:57:26  Clinician: Angela Manzanares  Clinician NPI: [id number]  Patient: Fadi Angela  Patient : 1985  Patient Address: [address] [place] [id number]  Patient Phone: [phone]  Visit Protocol: URI  Patient Summary:  Fadi is a 35 year old ( : 1985 ) male who initiated a Visit for COVID-19 (Coronavirus) evaluation and screening. When asked the question \"Please sign me up to receive news, health information and promotions from [employer].\", Fadi responded \"No\".    Fadi states his symptoms started suddenly 3-6 days ago.   His symptoms consist of a cough.   Symptom details   Cough: Fadi coughs a few times an hour and his cough is not more bothersome at night. Phlegm does not come into his throat when he coughs. He believes his cough is caused by post-nasal drip.    Fadi denies having wheezing, sore throat, nasal congestion, fever, ear pain, malaise, headache, rhinitis, enlarged lymph nodes, facial pain or pressure, myalgias, chills, and teeth pain. He also denies taking antibiotic medication for the symptoms, having recent facial or sinus surgery in the past 60 days, and double sickening (worsening symptoms after initial improvement). He is not experiencing dyspnea.   Precipitating events  He has not recently been exposed to someone with influenza. Fadi has not been in close contact with any high risk individuals.   Pertinent COVID-19 (Coronavirus) information  Fadi has not traveled internationally or to the areas where COVID-19 (Coronavirus) is widespread in the last 14 days before the start of his symptoms.   Fadi has not had a close contact with a laboratory-confirmed COVID-19 patient within 14 days of symptom onset. He also has not had a close contact with a suspected COVID-19 patient within 14 days of symptom onset.   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   Pertinent medical history  Fadi does not need a return to work/school note.   Weight: 155 lbs   Fadi " does not smoke or use smokeless tobacco.   Additional information as reported by the patient (free text): Symptoms started March 11th after working at a large venue with many people. Began with nausea and feeling run down. Vomited that night once and felt achy and had chills but did NOT have a fever as I checked several times (although after I checked my temperature I did medicate with tylenol. Cough started next day ( March 12th)and lasted 4-5 Days. No symptoms since Monday the 16th.   Weight: 155 lbs    MEDICATIONS: No current medications, ALLERGIES: amoxicillin, Sulfa (Sulfonamide Antibiotics)  Clinician Response:  Dear Fadi,   Based on the information you have provided, you do have symptoms that are consistent with Coronavirus (COVID-19).  The coronavirus causes mild to severe respiratory illness with the most common symptoms including fever, cough and difficulty breathing. Unfortunately, many viruses cause similar symptoms and it can be difficult to distinguish between viruses, especially in mild cases, so we are presuming that anyone with cough or fever has coronavirus at this time.  Coronavirus/COVID-19 has reached the point of community spread in [place], meaning that we are finding the virus in people with no known exposure risk for carlos the virus. Given the increasing commonness of coronavirus in the community we are no longer testing patients who are not critically ill.  If you are a health care worker, you should refer to your employee health office for instructions about returning to work.  For everyone else who has cough or fever, you should assume you are infected with coronavirus. Accordingly, you should self-quarantine for seven days from the first day your symptoms started OR 72 hours after your cough and fever completely resolve - WHICHEVER is LONGER. You should call if you find increasing shortness of breath, wheezing or sustained fever above 101.5. If you are significantly short of  breath or experience chest pain you should call 911 or report to the nearest emergency department for urgent evaluation.    Isolate yourself at home.   Do Not allow any visitors  Do Not go to work or school  Do Not go to [employer],  centers, shopping, or other public places.  Do Not shake hands.  Avoid close contact with others (hugging, kissing).   Protect Others:    Cover Your Mouth and Nose with a mask, disposable tissue or wash cloth to avoid spreading germs to others.  Wash your hands and face frequently with soap and water.   If you develop significant shortness of breath that prevents you from doing normal activities, please call 911 or proceed to the nearest emergency room and alert them immediately that you have been in self-isolation for possible coronavirus.   For more information about COVID19 and options for caring for yourself at home, please visit the CDC website at https://www.cdc.gov/coronavirus/2019-ncov/about/steps-when-sick.htmlFor more options for care at [facility], please visit our website at [email or web address]     Diagnosis: Cough  Diagnosis ICD: R05

## 2020-03-24 ENCOUNTER — OFFICE VISIT (OUTPATIENT)
Dept: URGENT CARE | Facility: URGENT CARE | Age: 35
End: 2020-03-24
Payer: COMMERCIAL

## 2020-03-24 ENCOUNTER — RESULTS ONLY (OUTPATIENT)
Dept: LAB | Age: 35
End: 2020-03-24

## 2020-03-24 DIAGNOSIS — Z20.822 SUSPECTED COVID-19 VIRUS INFECTION: Primary | ICD-10-CM

## 2020-03-24 NOTE — PATIENT INSTRUCTIONS
Please use the information at the end of this document to sign up for [facility] where you can get your results and a message about those results sent to you through the [employer] application. If you do not have mychart we will call you with your results but it may take longer.    Regardless of if you have been tested or not:  Patient who have symptoms (cough, fever, or shortness of breath), need to isolate for 7 days from when symptoms started OR 72 hours after fever resolves (without fever reducing medications) AND improvement of respiratory symptoms (whichever is longer).      Isolate yourself at home (in own room/own bathroom if possible)    Do Not allow any visitors    Do Not go to work or school    Do Not go to [employer],  centers, shopping, or other public places.    Do Not shake hands.    Avoid close and intimate contact with others (hugging, kissing).    Follow CDC recommendations for household cleaning of frequently touched services.     After the initial 7 days, continue to isolate yourself from household members as much as possible. To continue decrease the risk of community spread and exposure, you and any members of your household should limit activities in public for 14 days after starting home isolation.     You can reference the following CDC link for helpful home isolation/care tips:  https://www.cdc.gov/coronavirus/2019-ncov/downloads/10Things.pdf    Protect Others:    Cover Your Mouth and Nose with a mask, disposable tissue or wash cloth to avoid spreading germs to others.    Wash your hands and face frequently with soap and water    Call Back If: Breathing difficulty develops or you become worse.    For more information about COVID19 and options for caring for yourself at home, please visit the CDC website at https://www.cdc.gov/coronavirus/2019-ncov/about/steps-when-sick.html  For more options for care at [facility], please visit our website at  [email or web address]

## 2020-03-25 LAB
COVID-19 VIRUS PCR TO MAYO - RESULT: NORMAL
SPECIMEN SOURCE: NORMAL

## 2020-04-07 ENCOUNTER — VIRTUAL VISIT (OUTPATIENT)
Dept: FAMILY MEDICINE | Facility: OTHER | Age: 35
End: 2020-04-07
Payer: COMMERCIAL

## 2020-04-07 PROCEDURE — 99421 OL DIG E/M SVC 5-10 MIN: CPT | Performed by: PHYSICIAN ASSISTANT

## 2020-04-07 NOTE — PROGRESS NOTES
"Date: 2020 12:19:36  Clinician: Maria L Hall  Clinician NPI: [id number]  Patient: Fadi Angela  Patient : 1985  Patient Address: [address] [place] [address]  Patient Phone: [phone]  Visit Protocol: General skin conditions  Patient Summary:  Fadi is a 35 year old ( : 1985 ) male who initiated a Visit for evaluation of an unspecified skin condition. When asked the question \"Please sign me up to receive news, health information and promotions from [email or web address].\", Fadi responded \"No\".    Images of his skin condition were uploaded.  His symptoms started more than a month ago and affect both sides of his body. The skin condition is located on his face. The skin condition is red in color.   It feels itchy. The symptoms do not interfere with his sleep.   Symptom details   Redness: The redness has not rapidly increased in size.   The skin condition has not changed since the symptoms started.   Denied symptoms include hives, warm to touch, drainage, crusts, blisters, burning, tender to touch, scabs, pimples, numbness, dry/flaky skin, sores, and pain. Fadi does not feel feverish. He does not have a rash in the shape of a bull's-eye.   Treatments or home remedies used to relieve the symptoms as reported by the patient (free text): Been applying a Rx I had from my [facility] twice daily Hydrocortosone Valerate Cream USP, 0.2%. It has reduced itching but not improved the rash.   Precipitating events   Fadi did not come in contact with any irritants prior to the onset of his symptoms and has not been in close contact with anyone that has similar symptoms. He also did not spend time in a wooded area, swim, travel, or spend excess time in the sun just before his symptoms started. Fadi did not get bitten or stung by an insect.   Pertinent medical history  Fadi has not experienced this skin condition before.   Fadi has had chickenpox, but has not had shingles in the past.    Fadi does not have a history or " a family history of atopia. Ongoing medical conditions were denied.   Fadi does not need a return to work/school note.   Weight: 155 lbs   Fadi does not smoke or use smokeless tobacco.   Additional information as reported by the patient (free text): I first noticed the rash last summer. Went away over the winter even without using the Hydroctisone 0.2% cream. Then about a month ago I was outside for a good portion of a warmer day (50 degrees F) with a stocking cap on and the rash returned so I thought It might have been some sort of heat rash or sweat related rash since it is only on my forehead down to my eyes where my hat was covering. Since then I have  been using the cream   2x/day and not been where stocking caps. Never has it blistered   Weight: 155 lbs    MEDICATIONS: Pepcid AC oral, hydrocortisone valerate topical, ALLERGIES: Sulfa (Sulfonamide Antibiotics), amoxicillin  Clinician Response:  Dear Fadi,   Based on the information provided, you have contact dermatitis. Contact dermatitis is a condition involving skin inflammation. This occurs after contact with a substance that irritates the skin or causes an allergic skin reaction.   The most common symptom is a red, itchy rash. The skin may also be dry or cracked. Occasionally, blisters develop and form a crust on the surface of the skin after bursting.  Medication information  I am prescribing:     Triamcinolone acetonide (Triderm) 0.1% topical cream. Apply to the affected area(s) 2 times per day. Wash hands before and after use. There are no refills with this prescription.   Self care  Steps you can take to be as comfortable as possible:     Avoid scratching the rash    Apply a cool, wet washcloth to your rash for 15 minutes several times a day    Take a lukewarm bath to soothe the skin (adding colloidal oatmeal can help even more)    Apply a moisturizing lotion immediately after bathing and frequently reapply throughout the day    Use mild soap and laundry  detergent    Choose clothing and bedding made of a breathable material like cotton    Do not use antibiotic creams or ointments unless recommended by a provider     When to seek care  Please make an appointment to be seen in a clinic or urgent care if any of the following occur:     Symptoms do not improve after 14 days of treatment    New symptoms develop, or symptoms become worse    Symptoms are so severe that you are unable to sleep or do regular activities    You have areas of broken skin from scratching    You notice symptoms of a skin infection (spreading redness, pain that is not improving, fever, warmth)      Diagnosis: Contact dermatitis  Diagnosis ICD: L25.9  Prescription: triamcinolone acetonide (Triderm) 0.1 % topical cream 1 30 gram tube, 14 days supply. Apply to the affected area(s) 2 times per day. Refills: 0, Refill as needed: no, Allow substitutions: yes  Pharmacy: [id number] [place] [facility] [phone] - [address] [address]

## 2020-04-29 ENCOUNTER — VIRTUAL VISIT (OUTPATIENT)
Dept: FAMILY MEDICINE | Facility: OTHER | Age: 35
End: 2020-04-29

## 2020-04-29 NOTE — PROGRESS NOTES
"Date: 2020 13:16:10  Clinician: Rossy Mansfield  Clinician NPI: [id number]  Patient: Fadi Angela  Patient : 1985  Patient Address: [address] [place] [address]  Patient Phone: [phone]  Visit Protocol: Eczema  Patient Summary:  Fadi is a 35 year old ( : 1985 ) male who initiated a Visit for evaluation of contact dermatitis. When asked the question \"Please sign me up to receive news, health information and promotions from [employer].\", Fadi responded \"No\".    Images of his skin condition were uploaded.  His symptoms started more than a month ago. The rash is located on his hairline. The rash is red in color.    Symptom details   Redness: The redness has not rapidly increased in size.   Denied symptoms include blisters, itchiness, sores, drainage, dry skin, scabs, warm to touch, tender to touch, burning, pain, numbness, crusts, flaky skin, and scaly skin. Fadi does not feel feverish.   Treatments or home remedies used to relieve the symptoms as reported by the patient (free text): I've used oncare once before for the same issue, they prescribed Triamcinolone acetonide (Triderm) 0.1% topical cream. which I've used and am now running out. It seemed to help at first but doesn't seem to be getting rid of it totally   Precipitating events  Fadi did not come in contact with any irritants prior to the onset of his symptoms and has not been in close contact with anyone that has similar symptoms. He also did not spend time in a wooded area, swim, travel, or spend time in the sun just before his symptoms started.   Pertinent medical history  Fadi has experienced this skin condition before. His current skin condition does not come and go. The last time he experienced this skin condition was within the last 3 months.    Fadi does not have a history or a family history of atopia. Ongoing medical conditions were denied.   Fadi does not smoke or use smokeless tobacco.   Additional information as " reported by the patient (free text): I had contact dermatitis diagnosed last time I used [facility]. It seemed to help for the first week or two by using Triamcinolone acetonide (Triderm) 0.1% topical cream. but then seemed to come back after the first 2 weeks of using the medication twice daily. when it came back it was not as severe and not itching but still apparent it was there and has stayed since then. I've avoided using hats altogether as I didn't' want it to return and fairly certain that was the cause the first time.     MEDICATIONS: triamcinolone acetonide topical, Pepcid AC oral, hydrocortisone valerate topical, ALLERGIES: Sulfa (Sulfonamide Antibiotics), amoxicillin  Clinician Response:  Dear Fadi,  Based on the information provided, you have contact dermatitis. Contact dermatitis is a condition involving skin inflammation. This occurs after contact with a substance that irritates the skin or causes an allergic skin reaction.  The most common symptom is a red, itchy rash. The skin may also be dry or cracked. Occasionally, blisters develop and form a crust on the surface of the skin after bursting.  Medication information  I am prescribing:     Desonide (Desowen) 0.05% topical cream. Apply a thin layer to the affected area 2 times a day. Do not use longer than 4 weeks. Wash hands before and after use. There are no refills with this prescription.   I am recommending:     Aquaphor or store brand topical ointment for daily use to keep your skin moisturized.   Self care  Steps you can take to be as comfortable as possible:     Avoid scratching the rash    Take a lukewarm bath to soothe the skin (adding colloidal oatmeal can help even more)    Apply a moisturizing lotion immediately after bathing and frequently reapply throughout the day    Apply a cool, wet washcloth to your rash for 15 minutes several times a day    Use mild soap and laundry detergent    Choose clothing and bedding made of a breathable material  like cotton    Do not use antibiotic creams or ointments unless recommended by a  provider     When to seek care  Please make an appointment to be seen in a clinic or urgent care if any of the following occur:     You develop new symptoms or your symptoms become worse    Your rash hasn't improved after 14 days    Symptoms are so severe that you are unable to sleep or do regular activities    You have areas of broken skin from scratching    You notice symptoms of a skin infection (e.g. Spreading redness, pain that is not improving, fever, warmth)      Diagnosis: Contact dermatitis  Diagnosis ICD: L25.9  Prescription: desonide (DesOwen) 0.05 % topical cream 1 60 gram tube, 14 days supply. Apply a thin layer to the affected area 2 times a day. Refills: 0, Refill as needed: no, Allow substitutions: yes  [id number] [place] [facility] [phone] - [address] [address]

## 2020-05-19 ENCOUNTER — HOSPITAL ENCOUNTER (EMERGENCY)
Facility: CLINIC | Age: 35
Discharge: HOME | End: 2020-05-20
Attending: EMERGENCY MEDICINE | Admitting: EMERGENCY MEDICINE
Payer: COMMERCIAL

## 2020-05-19 DIAGNOSIS — T14.8XXA PUNCTURE WOUND: ICD-10-CM

## 2020-05-19 DIAGNOSIS — M79.644 PAIN OF FINGER OF RIGHT HAND: ICD-10-CM

## 2020-05-19 PROCEDURE — 29125 APPL SHORT ARM SPLINT STATIC: CPT | Mod: RT

## 2020-05-19 PROCEDURE — 99284 EMERGENCY DEPT VISIT MOD MDM: CPT | Mod: 25

## 2020-05-19 PROCEDURE — 96365 THER/PROPH/DIAG IV INF INIT: CPT

## 2020-05-19 ASSESSMENT — MIFFLIN-ST. JEOR: SCORE: 1662.01

## 2020-05-19 NOTE — ED AVS SNAPSHOT
Emergency Department  [address] [place] [address]  Phone:  [phone]  Fax:  [phone]                                    Emir Angela   MRN: [id number]    Department:   Emergency Department   Date of Visit:  5/19/2020           After Visit Summary Signature Page    I have received my discharge instructions, and my questions have been answered. I have discussed any challenges I see with this plan with the nurse or doctor.    ..........................................................................................................................................  Patient/Patient Representative Signature      ..........................................................................................................................................  Patient Representative Print Name and Relationship to Patient    ..................................................               ................................................  Date                                   Time    ..........................................................................................................................................  Reviewed by Signature/Title    ...................................................              ..............................................  Date                                               Time          22EPIC Rev 08/18

## 2020-05-20 ENCOUNTER — HOSPITAL ENCOUNTER (INPATIENT)
Facility: CLINIC | Age: 35
LOS: 2 days | Discharge: HOME HEALTH | DRG: 514 | End: 2020-05-22
Attending: ORTHOPAEDIC SURGERY | Admitting: ORTHOPAEDIC SURGERY
Payer: COMMERCIAL

## 2020-05-20 ENCOUNTER — ANESTHESIA EVENT (OUTPATIENT)
Dept: SURGERY | Facility: CLINIC | Age: 35
DRG: 514 | End: 2020-05-20
Payer: COMMERCIAL

## 2020-05-20 ENCOUNTER — APPOINTMENT (OUTPATIENT)
Dept: GENERAL RADIOLOGY | Facility: CLINIC | Age: 35
End: 2020-05-20
Attending: EMERGENCY MEDICINE
Payer: COMMERCIAL

## 2020-05-20 ENCOUNTER — ANESTHESIA (OUTPATIENT)
Dept: SURGERY | Facility: CLINIC | Age: 35
DRG: 514 | End: 2020-05-20
Payer: COMMERCIAL

## 2020-05-20 VITALS
OXYGEN SATURATION: 97 % | RESPIRATION RATE: 18 BRPM | BODY MASS INDEX: 22.9 KG/M2 | HEART RATE: 60 BPM | WEIGHT: 160 LBS | SYSTOLIC BLOOD PRESSURE: 122 MMHG | TEMPERATURE: 98.4 F | HEIGHT: 70 IN | DIASTOLIC BLOOD PRESSURE: 93 MMHG

## 2020-05-20 DIAGNOSIS — M65.10 INFECTION OF FLEXOR TENDON SHEATH: Primary | ICD-10-CM

## 2020-05-20 LAB
ANION GAP SERPL CALCULATED.3IONS-SCNC: 5 MMOL/L (ref 3–14)
BASOPHILS # BLD AUTO: 0 10E9/L (ref 0–0.2)
BASOPHILS NFR BLD AUTO: 0.4 %
BUN SERPL-MCNC: 26 MG/DL (ref 7–30)
CALCIUM SERPL-MCNC: 9.4 MG/DL (ref 8.5–10.1)
CHLORIDE SERPL-SCNC: 105 MMOL/L (ref 94–109)
CO2 SERPL-SCNC: 27 MMOL/L (ref 20–32)
CREAT SERPL-MCNC: 1.07 MG/DL (ref 0.66–1.25)
DIFFERENTIAL METHOD BLD: NORMAL
EOSINOPHIL # BLD AUTO: 0.2 10E9/L (ref 0–0.7)
EOSINOPHIL NFR BLD AUTO: 2.8 %
ERYTHROCYTE [DISTWIDTH] IN BLOOD BY AUTOMATED COUNT: 12.9 % (ref 10–15)
GFR SERPL CREATININE-BSD FRML MDRD: 89 ML/MIN/{1.73_M2}
GLUCOSE SERPL-MCNC: 92 MG/DL (ref 70–99)
GRAM STN SPEC: NORMAL
HCT VFR BLD AUTO: 42.2 % (ref 40–53)
HGB BLD-MCNC: 14.1 G/DL (ref 13.3–17.7)
IMM GRANULOCYTES # BLD: 0 10E9/L (ref 0–0.4)
IMM GRANULOCYTES NFR BLD: 0.1 %
LYMPHOCYTES # BLD AUTO: 2.7 10E9/L (ref 0.8–5.3)
LYMPHOCYTES NFR BLD AUTO: 34.3 %
Lab: NORMAL
MCH RBC QN AUTO: 29.8 PG (ref 26.5–33)
MCHC RBC AUTO-ENTMCNC: 33.4 G/DL (ref 31.5–36.5)
MCV RBC AUTO: 89 FL (ref 78–100)
MONOCYTES # BLD AUTO: 0.6 10E9/L (ref 0–1.3)
MONOCYTES NFR BLD AUTO: 7.6 %
NEUTROPHILS # BLD AUTO: 4.4 10E9/L (ref 1.6–8.3)
NEUTROPHILS NFR BLD AUTO: 54.8 %
NRBC # BLD AUTO: 0 10*3/UL
NRBC BLD AUTO-RTO: 0 /100
PLATELET # BLD AUTO: 265 10E9/L (ref 150–450)
POTASSIUM SERPL-SCNC: 3.8 MMOL/L (ref 3.4–5.3)
RBC # BLD AUTO: 4.73 10E12/L (ref 4.4–5.9)
SARS-COV-2 PCR COMMENT: NORMAL
SARS-COV-2 RNA SPEC QL NAA+PROBE: NEGATIVE
SARS-COV-2 RNA SPEC QL NAA+PROBE: NORMAL
SODIUM SERPL-SCNC: 137 MMOL/L (ref 133–144)
SPECIMEN SOURCE: NORMAL
WBC # BLD AUTO: 7.9 10E9/L (ref 4–11)

## 2020-05-20 PROCEDURE — 25000128 H RX IP 250 OP 636: Performed by: NURSE ANESTHETIST, CERTIFIED REGISTERED

## 2020-05-20 PROCEDURE — 80048 BASIC METABOLIC PNL TOTAL CA: CPT | Performed by: EMERGENCY MEDICINE

## 2020-05-20 PROCEDURE — 87070 CULTURE OTHR SPECIMN AEROBIC: CPT | Performed by: ORTHOPAEDIC SURGERY

## 2020-05-20 PROCEDURE — 12000011 ZZH R&B MS OVERFLOW

## 2020-05-20 PROCEDURE — 96365 THER/PROPH/DIAG IV INF INIT: CPT

## 2020-05-20 PROCEDURE — 37000009 ZZH ANESTHESIA TECHNICAL FEE, EACH ADDTL 15 MIN: Performed by: ORTHOPAEDIC SURGERY

## 2020-05-20 PROCEDURE — 36000060 ZZH SURGERY LEVEL 3 W FLUORO 1ST 30 MIN: Performed by: ORTHOPAEDIC SURGERY

## 2020-05-20 PROCEDURE — 0L970ZZ DRAINAGE OF RIGHT HAND TENDON, OPEN APPROACH: ICD-10-PCS | Performed by: ORTHOPAEDIC SURGERY

## 2020-05-20 PROCEDURE — 25800030 ZZH RX IP 258 OP 636: Performed by: ANESTHESIOLOGY

## 2020-05-20 PROCEDURE — 27210794 ZZH OR GENERAL SUPPLY STERILE: Performed by: ORTHOPAEDIC SURGERY

## 2020-05-20 PROCEDURE — 25000132 ZZH RX MED GY IP 250 OP 250 PS 637: Performed by: ORTHOPAEDIC SURGERY

## 2020-05-20 PROCEDURE — 71000012 ZZH RECOVERY PHASE 1 LEVEL 1 FIRST HR: Performed by: ORTHOPAEDIC SURGERY

## 2020-05-20 PROCEDURE — 71000013 ZZH RECOVERY PHASE 1 LEVEL 1 EA ADDTL HR: Performed by: ORTHOPAEDIC SURGERY

## 2020-05-20 PROCEDURE — 40000306 ZZH STATISTIC PRE PROC ASSESS II: Performed by: ORTHOPAEDIC SURGERY

## 2020-05-20 PROCEDURE — 87205 SMEAR GRAM STAIN: CPT | Performed by: ORTHOPAEDIC SURGERY

## 2020-05-20 PROCEDURE — 25000128 H RX IP 250 OP 636: Performed by: EMERGENCY MEDICINE

## 2020-05-20 PROCEDURE — 87635 SARS-COV-2 COVID-19 AMP PRB: CPT | Performed by: PHYSICIAN ASSISTANT

## 2020-05-20 PROCEDURE — 25000128 H RX IP 250 OP 636: Performed by: ORTHOPAEDIC SURGERY

## 2020-05-20 PROCEDURE — 85025 COMPLETE CBC W/AUTO DIFF WBC: CPT | Performed by: EMERGENCY MEDICINE

## 2020-05-20 PROCEDURE — 25000125 ZZHC RX 250: Performed by: ORTHOPAEDIC SURGERY

## 2020-05-20 PROCEDURE — 25000125 ZZHC RX 250: Performed by: NURSE ANESTHETIST, CERTIFIED REGISTERED

## 2020-05-20 PROCEDURE — 87075 CULTR BACTERIA EXCEPT BLOOD: CPT | Performed by: ORTHOPAEDIC SURGERY

## 2020-05-20 PROCEDURE — 37000008 ZZH ANESTHESIA TECHNICAL FEE, 1ST 30 MIN: Performed by: ORTHOPAEDIC SURGERY

## 2020-05-20 PROCEDURE — 73130 X-RAY EXAM OF HAND: CPT | Mod: RT

## 2020-05-20 PROCEDURE — 25000132 ZZH RX MED GY IP 250 OP 250 PS 637: Performed by: ANESTHESIOLOGY

## 2020-05-20 PROCEDURE — 36000058 ZZH SURGERY LEVEL 3 EA 15 ADDTL MIN: Performed by: ORTHOPAEDIC SURGERY

## 2020-05-20 RX ORDER — IBUPROFEN 200 MG
600 TABLET ORAL EVERY 8 HOURS PRN
COMMUNITY
End: 2020-06-22

## 2020-05-20 RX ORDER — CEFTRIAXONE 1 G/1
1 INJECTION, POWDER, FOR SOLUTION INTRAMUSCULAR; INTRAVENOUS ONCE
Status: DISCONTINUED | OUTPATIENT
Start: 2020-05-20 | End: 2020-05-20

## 2020-05-20 RX ORDER — GLYCOPYRROLATE 0.2 MG/ML
INJECTION, SOLUTION INTRAMUSCULAR; INTRAVENOUS PRN
Status: DISCONTINUED | OUTPATIENT
Start: 2020-05-20 | End: 2020-05-20

## 2020-05-20 RX ORDER — ACETAMINOPHEN 325 MG/1
650 TABLET ORAL EVERY 4 HOURS PRN
Status: DISCONTINUED | OUTPATIENT
Start: 2020-05-23 | End: 2020-05-22 | Stop reason: HOSPADM

## 2020-05-20 RX ORDER — SODIUM CHLORIDE, SODIUM LACTATE, POTASSIUM CHLORIDE, CALCIUM CHLORIDE 600; 310; 30; 20 MG/100ML; MG/100ML; MG/100ML; MG/100ML
INJECTION, SOLUTION INTRAVENOUS CONTINUOUS
Status: DISCONTINUED | OUTPATIENT
Start: 2020-05-20 | End: 2020-05-22 | Stop reason: HOSPADM

## 2020-05-20 RX ORDER — PROPOFOL 10 MG/ML
INJECTION, EMULSION INTRAVENOUS CONTINUOUS PRN
Status: DISCONTINUED | OUTPATIENT
Start: 2020-05-20 | End: 2020-05-20

## 2020-05-20 RX ORDER — ACETAMINOPHEN 325 MG/1
975 TABLET ORAL ONCE
Status: COMPLETED | OUTPATIENT
Start: 2020-05-20 | End: 2020-05-20

## 2020-05-20 RX ORDER — LIDOCAINE 40 MG/G
CREAM TOPICAL
Status: DISCONTINUED | OUTPATIENT
Start: 2020-05-20 | End: 2020-05-20 | Stop reason: HOSPADM

## 2020-05-20 RX ORDER — ACETAMINOPHEN 500 MG
1000 TABLET ORAL EVERY 8 HOURS PRN
Status: ON HOLD | COMMUNITY
End: 2020-05-21

## 2020-05-20 RX ORDER — HYDROMORPHONE HYDROCHLORIDE 1 MG/ML
.3-.5 INJECTION, SOLUTION INTRAMUSCULAR; INTRAVENOUS; SUBCUTANEOUS EVERY 10 MIN PRN
Status: DISCONTINUED | OUTPATIENT
Start: 2020-05-20 | End: 2020-05-20 | Stop reason: HOSPADM

## 2020-05-20 RX ORDER — LIDOCAINE HYDROCHLORIDE 10 MG/ML
INJECTION, SOLUTION INFILTRATION; PERINEURAL PRN
Status: DISCONTINUED | OUTPATIENT
Start: 2020-05-20 | End: 2020-05-20

## 2020-05-20 RX ORDER — GINSENG 100 MG
CAPSULE ORAL PRN
Status: DISCONTINUED | OUTPATIENT
Start: 2020-05-20 | End: 2020-05-20 | Stop reason: HOSPADM

## 2020-05-20 RX ORDER — ONDANSETRON 4 MG/1
4 TABLET, ORALLY DISINTEGRATING ORAL EVERY 30 MIN PRN
Status: DISCONTINUED | OUTPATIENT
Start: 2020-05-20 | End: 2020-05-20 | Stop reason: HOSPADM

## 2020-05-20 RX ORDER — FENTANYL CITRATE 50 UG/ML
INJECTION, SOLUTION INTRAMUSCULAR; INTRAVENOUS PRN
Status: DISCONTINUED | OUTPATIENT
Start: 2020-05-20 | End: 2020-05-20

## 2020-05-20 RX ORDER — IBUPROFEN 600 MG/1
600 TABLET, FILM COATED ORAL EVERY 6 HOURS PRN
Status: DISCONTINUED | OUTPATIENT
Start: 2020-05-20 | End: 2020-05-22 | Stop reason: HOSPADM

## 2020-05-20 RX ORDER — PROPOFOL 10 MG/ML
INJECTION, EMULSION INTRAVENOUS PRN
Status: DISCONTINUED | OUTPATIENT
Start: 2020-05-20 | End: 2020-05-20

## 2020-05-20 RX ORDER — ALUMINA, MAGNESIA, AND SIMETHICONE 2400; 2400; 240 MG/30ML; MG/30ML; MG/30ML
30 SUSPENSION ORAL EVERY 4 HOURS PRN
Status: DISCONTINUED | OUTPATIENT
Start: 2020-05-20 | End: 2020-05-22 | Stop reason: HOSPADM

## 2020-05-20 RX ORDER — DEXAMETHASONE SODIUM PHOSPHATE 4 MG/ML
INJECTION, SOLUTION INTRA-ARTICULAR; INTRALESIONAL; INTRAMUSCULAR; INTRAVENOUS; SOFT TISSUE PRN
Status: DISCONTINUED | OUTPATIENT
Start: 2020-05-20 | End: 2020-05-20

## 2020-05-20 RX ORDER — MEPERIDINE HYDROCHLORIDE 50 MG/ML
12.5 INJECTION INTRAMUSCULAR; INTRAVENOUS; SUBCUTANEOUS
Status: DISCONTINUED | OUTPATIENT
Start: 2020-05-20 | End: 2020-05-20 | Stop reason: HOSPADM

## 2020-05-20 RX ORDER — LABETALOL 20 MG/4 ML (5 MG/ML) INTRAVENOUS SYRINGE
10
Status: DISCONTINUED | OUTPATIENT
Start: 2020-05-20 | End: 2020-05-20 | Stop reason: HOSPADM

## 2020-05-20 RX ORDER — NALOXONE HYDROCHLORIDE 0.4 MG/ML
.1-.4 INJECTION, SOLUTION INTRAMUSCULAR; INTRAVENOUS; SUBCUTANEOUS
Status: DISCONTINUED | OUTPATIENT
Start: 2020-05-20 | End: 2020-05-20 | Stop reason: HOSPADM

## 2020-05-20 RX ORDER — DESONIDE 0.5 MG/G
CREAM TOPICAL 2 TIMES DAILY
COMMUNITY
End: 2020-06-22

## 2020-05-20 RX ORDER — OXYCODONE HYDROCHLORIDE 5 MG/1
5-10 TABLET ORAL
Status: DISCONTINUED | OUTPATIENT
Start: 2020-05-20 | End: 2020-05-22 | Stop reason: HOSPADM

## 2020-05-20 RX ORDER — SODIUM CHLORIDE, SODIUM LACTATE, POTASSIUM CHLORIDE, CALCIUM CHLORIDE 600; 310; 30; 20 MG/100ML; MG/100ML; MG/100ML; MG/100ML
INJECTION, SOLUTION INTRAVENOUS CONTINUOUS
Status: DISCONTINUED | OUTPATIENT
Start: 2020-05-20 | End: 2020-05-20 | Stop reason: HOSPADM

## 2020-05-20 RX ORDER — CEFTRIAXONE 2 G/1
2 INJECTION, POWDER, FOR SOLUTION INTRAMUSCULAR; INTRAVENOUS ONCE
Status: COMPLETED | OUTPATIENT
Start: 2020-05-20 | End: 2020-05-20

## 2020-05-20 RX ORDER — OXYCODONE HYDROCHLORIDE 5 MG/1
5 TABLET ORAL EVERY 6 HOURS PRN
Qty: 3 TABLET | Refills: 0 | Status: ON HOLD | OUTPATIENT
Start: 2020-05-20 | End: 2020-05-21

## 2020-05-20 RX ORDER — CEFAZOLIN SODIUM 1 G/50ML
1 INJECTION, SOLUTION INTRAVENOUS EVERY 8 HOURS
Status: COMPLETED | OUTPATIENT
Start: 2020-05-21 | End: 2020-05-21

## 2020-05-20 RX ORDER — ONDANSETRON 4 MG/1
4 TABLET, ORALLY DISINTEGRATING ORAL EVERY 6 HOURS PRN
Status: DISCONTINUED | OUTPATIENT
Start: 2020-05-20 | End: 2020-05-22 | Stop reason: HOSPADM

## 2020-05-20 RX ORDER — BUPIVACAINE HYDROCHLORIDE 2.5 MG/ML
INJECTION, SOLUTION EPIDURAL; INFILTRATION; INTRACAUDAL PRN
Status: DISCONTINUED | OUTPATIENT
Start: 2020-05-20 | End: 2020-05-20 | Stop reason: HOSPADM

## 2020-05-20 RX ORDER — ONDANSETRON 2 MG/ML
4 INJECTION INTRAMUSCULAR; INTRAVENOUS EVERY 30 MIN PRN
Status: DISCONTINUED | OUTPATIENT
Start: 2020-05-20 | End: 2020-05-20 | Stop reason: HOSPADM

## 2020-05-20 RX ORDER — FENTANYL CITRATE 50 UG/ML
25-50 INJECTION, SOLUTION INTRAMUSCULAR; INTRAVENOUS
Status: DISCONTINUED | OUTPATIENT
Start: 2020-05-20 | End: 2020-05-20 | Stop reason: HOSPADM

## 2020-05-20 RX ORDER — ONDANSETRON 2 MG/ML
4 INJECTION INTRAMUSCULAR; INTRAVENOUS EVERY 6 HOURS PRN
Status: DISCONTINUED | OUTPATIENT
Start: 2020-05-20 | End: 2020-05-22 | Stop reason: HOSPADM

## 2020-05-20 RX ORDER — CEFAZOLIN SODIUM 1 G/3ML
INJECTION, POWDER, FOR SOLUTION INTRAMUSCULAR; INTRAVENOUS PRN
Status: DISCONTINUED | OUTPATIENT
Start: 2020-05-20 | End: 2020-05-20

## 2020-05-20 RX ORDER — ACETAMINOPHEN 325 MG/1
975 TABLET ORAL EVERY 8 HOURS
Status: DISCONTINUED | OUTPATIENT
Start: 2020-05-21 | End: 2020-05-22 | Stop reason: HOSPADM

## 2020-05-20 RX ORDER — DIPHENHYDRAMINE HCL 25 MG
25 CAPSULE ORAL EVERY 6 HOURS PRN
Status: DISCONTINUED | OUTPATIENT
Start: 2020-05-20 | End: 2020-05-22 | Stop reason: HOSPADM

## 2020-05-20 RX ORDER — DIPHENHYDRAMINE HYDROCHLORIDE 50 MG/ML
25 INJECTION INTRAMUSCULAR; INTRAVENOUS EVERY 6 HOURS PRN
Status: DISCONTINUED | OUTPATIENT
Start: 2020-05-20 | End: 2020-05-22 | Stop reason: HOSPADM

## 2020-05-20 RX ORDER — LIDOCAINE 40 MG/G
CREAM TOPICAL
Status: DISCONTINUED | OUTPATIENT
Start: 2020-05-20 | End: 2020-05-22 | Stop reason: HOSPADM

## 2020-05-20 RX ORDER — NALOXONE HYDROCHLORIDE 0.4 MG/ML
.1-.4 INJECTION, SOLUTION INTRAMUSCULAR; INTRAVENOUS; SUBCUTANEOUS
Status: DISCONTINUED | OUTPATIENT
Start: 2020-05-20 | End: 2020-05-22 | Stop reason: HOSPADM

## 2020-05-20 RX ORDER — KETOROLAC TROMETHAMINE 30 MG/ML
INJECTION, SOLUTION INTRAMUSCULAR; INTRAVENOUS PRN
Status: DISCONTINUED | OUTPATIENT
Start: 2020-05-20 | End: 2020-05-20

## 2020-05-20 RX ORDER — ONDANSETRON 2 MG/ML
INJECTION INTRAMUSCULAR; INTRAVENOUS PRN
Status: DISCONTINUED | OUTPATIENT
Start: 2020-05-20 | End: 2020-05-20

## 2020-05-20 RX ADMIN — DEXAMETHASONE SODIUM PHOSPHATE 4 MG: 4 INJECTION, SOLUTION INTRA-ARTICULAR; INTRALESIONAL; INTRAMUSCULAR; INTRAVENOUS; SOFT TISSUE at 16:12

## 2020-05-20 RX ADMIN — GENTAMICIN SULFATE: 40 INJECTION, SOLUTION INTRAMUSCULAR; INTRAVENOUS at 16:10

## 2020-05-20 RX ADMIN — ASPIRIN 325 MG: 325 TABLET, DELAYED RELEASE ORAL at 20:23

## 2020-05-20 RX ADMIN — ONDANSETRON HYDROCHLORIDE 4 MG: 2 INJECTION, SOLUTION INTRAVENOUS at 16:12

## 2020-05-20 RX ADMIN — LIDOCAINE HYDROCHLORIDE 50 MG: 10 INJECTION, SOLUTION INFILTRATION; PERINEURAL at 15:14

## 2020-05-20 RX ADMIN — GLYCOPYRROLATE 0.2 MG: 0.2 INJECTION, SOLUTION INTRAMUSCULAR; INTRAVENOUS at 15:25

## 2020-05-20 RX ADMIN — FENTANYL CITRATE 50 MCG: 50 INJECTION, SOLUTION INTRAMUSCULAR; INTRAVENOUS at 15:52

## 2020-05-20 RX ADMIN — MIDAZOLAM 2 MG: 1 INJECTION INTRAMUSCULAR; INTRAVENOUS at 15:10

## 2020-05-20 RX ADMIN — CEFTRIAXONE 2 G: 2 INJECTION, POWDER, FOR SOLUTION INTRAMUSCULAR; INTRAVENOUS at 01:35

## 2020-05-20 RX ADMIN — KETOROLAC TROMETHAMINE 30 MG: 30 INJECTION, SOLUTION INTRAMUSCULAR at 16:22

## 2020-05-20 RX ADMIN — PROPOFOL 150 MCG/KG/MIN: 10 INJECTION, EMULSION INTRAVENOUS at 15:25

## 2020-05-20 RX ADMIN — FENTANYL CITRATE 50 MCG: 50 INJECTION, SOLUTION INTRAMUSCULAR; INTRAVENOUS at 16:08

## 2020-05-20 RX ADMIN — FENTANYL CITRATE 50 MCG: 50 INJECTION, SOLUTION INTRAMUSCULAR; INTRAVENOUS at 15:14

## 2020-05-20 RX ADMIN — FENTANYL CITRATE 50 MCG: 50 INJECTION, SOLUTION INTRAMUSCULAR; INTRAVENOUS at 15:18

## 2020-05-20 RX ADMIN — PROPOFOL 200 MG: 10 INJECTION, EMULSION INTRAVENOUS at 15:17

## 2020-05-20 RX ADMIN — Medication 100 MG: at 15:18

## 2020-05-20 RX ADMIN — CEFAZOLIN 2 G: 1 INJECTION, POWDER, FOR SOLUTION INTRAMUSCULAR; INTRAVENOUS at 16:27

## 2020-05-20 RX ADMIN — SODIUM CHLORIDE, POTASSIUM CHLORIDE, SODIUM LACTATE AND CALCIUM CHLORIDE: 600; 310; 30; 20 INJECTION, SOLUTION INTRAVENOUS at 15:11

## 2020-05-20 RX ADMIN — ACETAMINOPHEN 975 MG: 325 TABLET, FILM COATED ORAL at 17:24

## 2020-05-20 RX ADMIN — PROPOFOL 40 MG: 10 INJECTION, EMULSION INTRAVENOUS at 16:27

## 2020-05-20 SDOH — HEALTH STABILITY: MENTAL HEALTH: CURRENT SMOKER: 0

## 2020-05-20 ASSESSMENT — MIFFLIN-ST. JEOR: SCORE: 1632.98

## 2020-05-20 NOTE — ANESTHESIA PREPROCEDURE EVALUATION
Anesthesia Pre-Procedure Evaluation    Patient: Emir Angela   MRN: [id number] : 1985          Preoperative Diagnosis: Infection [B99.9]    Procedure(s):  Irrigation and debridement right long finger flexor sheath    Past Medical History:   Diagnosis Date     Chronic nonallergic rhinitis 5/27/10 skin tests    all negative     Gastroesophageal reflux disease without esophagitis 10/6/2015     GERD (gastroesophageal reflux disease)      Past Surgical History:   Procedure Laterality Date     APPENDECTOMY       ESOPHAGOSCOPY, GASTROSCOPY, DUODENOSCOPY (EGD), COMBINED N/A 2018    Procedure: COMBINED ESOPHAGOSCOPY, GASTROSCOPY, DUODENOSCOPY (EGD), BIOPSY SINGLE OR MULTIPLE;  Surgeon: Remy Vigil MD;  Location:  GI     SINUS SURGERY  4/27/10     Anesthesia Evaluation     . Pt has had prior anesthetic. Type: General    No history of anesthetic complications          ROS/MED HX    ENT/Pulmonary:  - neg pulmonary ROS     Neurologic:  - neg neurologic ROS     Cardiovascular:  - neg cardiovascular ROS       METS/Exercise Tolerance:     Hematologic:  - neg hematologic  ROS       Musculoskeletal:   (+)  other musculoskeletal-       GI/Hepatic:     (+) GERD Asymptomatic on medication,       Renal/Genitourinary:  - ROS Renal section negative       Endo:  - neg endo ROS       Psychiatric:  - neg psychiatric ROS       Infectious Disease:   (+) Other Infectious Disease       Malignancy:         Other:    - neg other ROS                      Physical Exam  Normal systems: cardiovascular, pulmonary and dental    Airway   Mallampati: II  TM distance: >3 FB  Neck ROM: full    Dental     Cardiovascular       Pulmonary             Lab Results   Component Value Date    WBC 7.9 2020    HGB 14.1 2020    HCT 42.2 2020     2020     2020    POTASSIUM 3.8 2020    CHLORIDE 105 2020    CO2 27 2020    BUN 26 2020    CR 1.07 2020    GLC 92  "05/20/2020    MLEVA 9.4 05/20/2020    ALBUMIN 4.5 01/08/2010    PROTTOTAL 7.3 01/08/2010    ALT 31 01/08/2010    AST 42 01/08/2010    ALKPHOS 64 01/08/2010    BILITOTAL 0.5 01/08/2010       Preop Vitals  BP Readings from Last 3 Encounters:   05/20/20 (!) 122/93   01/13/20 119/78   12/16/19 134/83    Pulse Readings from Last 3 Encounters:   05/20/20 60   01/13/20 79   11/15/19 89      Resp Readings from Last 3 Encounters:   05/19/20 18   06/14/19 12   11/09/18 12    SpO2 Readings from Last 3 Encounters:   05/20/20 97%   01/13/20 96%   11/15/19 99%      Temp Readings from Last 1 Encounters:   05/19/20 98.4  F (36.9  C) (Temporal)    Ht Readings from Last 1 Encounters:   05/19/20 1.778 m (5' 10\")      Wt Readings from Last 1 Encounters:   05/19/20 72.6 kg (160 lb)    Estimated body mass index is 22.96 kg/m  as calculated from the following:    Height as of 5/19/20: 1.778 m (5' 10\").    Weight as of 5/19/20: 72.6 kg (160 lb).       Anesthesia Plan      History & Physical Review  History and physical reviewed and following examination; no interval change.    ASA Status:  1 .    NPO Status:  Unknown    Plan for General with Intravenous induction. Maintenance will be Balanced.    PONV prophylaxis:  Ondansetron (or other 5HT-3) and Dexamethasone or Solumedrol    The patient is not a current smoker      Postoperative Care  Postoperative pain management:  IV analgesics, Oral pain medications and Multi-modal analgesia.      Consents  Anesthetic plan, risks, benefits and alternatives discussed with:  Patient..                 Sang Felton MD                    .  "

## 2020-05-20 NOTE — PLAN OF CARE
ROOM # 206-2    Living Situation (if not independent, order SW consult): Lives with wife   Facility name:  : [facility] @ [phone]    Activity level at baseline: independent   Activity level on admit: Assist of 1       Patient registered to observation; given Patient Bill of Rights; given the opportunity to ask questions about observation status and their plan of care.  Patient has been oriented to the observation room, bathroom and call light is in place.    Discussed discharge goals and expectations with patient/family.

## 2020-05-20 NOTE — ANESTHESIA CARE TRANSFER NOTE
Patient: Emir Angela    Procedure(s):  Irrigation and debridement right long finger flexor sheath    Diagnosis: Infection [B99.9]  Diagnosis Additional Information: No value filed.    Anesthesia Type:   General     Note:  Airway :Blow-by  Patient transferred to:PACU  Handoff Report: Identifed the Patient, Identified the Reponsible Provider, Reviewed the pertinent medical history, Discussed the surgical course, Reviewed Intra-OP anesthesia mangement and issues during anesthesia, Set expectations for post-procedure period and Allowed opportunity for questions and acknowledgement of understanding      Vitals: (Last set prior to Anesthesia Care Transfer)    CRNA VITALS  5/20/2020 1622 - 5/20/2020 1703      5/20/2020             NIBP:  103/60    Pulse:  68    NIBP Mean:  77    SpO2:  96 %                Electronically Signed By: MOO Og CRNA  May 20, 2020  5:03 PM

## 2020-05-20 NOTE — DISCHARGE INSTRUCTIONS
Due to your penicillin allergy we gave you ceftriaxone 2 g at around 1:30 AM.  This should cover you for around 24 hours.    Rest and elevate the hand.  Please see hand surgery today on May 20.  They should call you for an appointment.    If worsening redness, swelling, numbness or tingling or other changes return to the ER at any time.    Stay nothing by mouth until cleared by ortho

## 2020-05-20 NOTE — ED PROVIDER NOTES
"  History     Chief Complaint:  Finger Pain     The history is provided by the patient.      Emir Angela is a 35 year old right handed male who presents for evaluation of right 3rd finger pain and swelling starting around four to five hours ago. The patient states that he was trimming a tree when he grabbed a buckthorn branch with sharp spikes on it that punctured his finger. He believes that this may have pierced 1/4 inch into his finger. He states that the finger initially was bleeding, however this stopped sometime shortly after. He did not wash the finger initially. He states that since then the pain has worsened, the finger has swollen and the finger has become slightly numb to the touch. He states that he feels that the pain has begun to spread into his 2nd and 4th fingers and that flexion of the 3rd digit greatly worsens the pain. The patient notes no other injuries and denies nausea and vomiting.  He states that he initially thought this was a strained tendon but presents today concerned for a possible infection due to bacteria on the buckthorn branch, noting that he is an  at a high school. He last ate around four and a half hours ago. Patient's tetanus is up to date.     Allergies:  Amoxicillin  Sulfa Drugs     Medications:    Pepcid    Past Medical History:    GERD    Past Surgical History:    Appendectomy  EGD combined  Sinus surgery    Family History:    Hypertension  Allergies    Social History:  The patient presents to the ED alone.  Smoking Status: Never Smoker  Smokeless Tobacco: Never Used  Alcohol Use: Yes  Drug Use: No  PCP: Clinic, [facility]     Review of Systems   All other systems reviewed and are negative.    Physical Exam     Patient Vitals for the past 24 hrs:   BP Temp Temp src Heart Rate Resp SpO2 Height Weight   05/19/20 2305 130/89 98.4  F (36.9  C) Temporal 63 18 99 % 1.778 m (5' 10\") 72.6 kg (160 lb)       Physical Exam  General: sitting in a chair.  Appears " comfortable   Head: No obvious trauma to head.  Ears, Nose, Throat:  External ears normal.  Nose normal.    Eyes:  Conjunctivae clear.    CV: Regular rate and rhythm.  No murmurs.      Respiratory: Effort normal and breath sounds normal.  No wheezing or crackles.   Gastrointestinal: Soft.  No distension. There is no tenderness.    Musculoskeletal: Right third distal hip held in semi-flexion.  Fusiform swelling.  Tenderness to palpation of the flexor tendon sheath.  Tenderness with passive and active range of motion.  Sensation intact to light touch.  Cap refill normal.  See photos below.     Neuro: Alert. Moving all extremities appropriately.  Normal speech.    Skin: Skin is warm and dry.  No rash noted.                         Emergency Department Course     Imaging:  Radiology findings were communicated with the patient who voiced understanding of the findings.    XR Hand Right G/E 3 Views:  No acute fracture or aggressive bone lesion. Slight soft tissue swelling right third finger proximally.  As per radiology.     Laboratory:  Laboratory findings were communicated with the patient who voiced understanding of the findings.    CBC: WBC: 7.9, HGB: 14.1, PLT: 265    BMP: WNL (Creatinine: 1.07)    Procedures:    Narrative: Splint     Resting hand Splint was applied to right forearm, wrist and hand and after placement I checked and adjusted the fit to ensure proper positioning. Patient was more comfortable with splint in place. Sensation and circulation are intact after splint placement.      Interventions:  0135 Rocephin 2g IV    Emergency Department Course:  Past medical records, nursing notes, and vitals reviewed.    2330 I performed an exam of the patient as documented above.     IV was inserted and blood was drawn for laboratory testing, results above.    The patient was sent for a hand XR while in the emergency department, results above.     0050 I rechecked the patient and discussed the results of his workup  thus far. I performed the bedside ultrasound at this time.     0100 I spoke with jenae Borrego, regarding the patient.    0140 I rechecked the patient and at this point I feel can safely be discharged home. The patient agrees and will be sent home shortly.     Findings and plan explained to the patient. Patient discharged home with instructions regarding supportive care, medications, and reasons to return. The importance of close follow-up was reviewed. The patient was prescribed oxycodone.    I personally reviewed the laboratory and imaging results with the patient and answered all related questions prior to discharge.     Impression & Plan       Medical Decision Makin-year-old male otherwise healthy presents with injury to right third digit.  Vital signs stable.  Broad differentials pursued including not limited to retained foreign body, inflammation, fracture dislocation, sprain strain, tendon injury, flexor tenosynovitis, cellulitis, abscess, etc.  On examination concern for flexor tenosynovitis based on puncture wound to the palmar aspect of the hand along with fusiform swelling and pain with active/passive flexion.  Patient's holding hand in semi-flexion as well.  CBC shows no leukocytosis or anemia.  BMP shows no acute electrolyte, metabolic, renal dysfunction.  Bedside ultrasound revealed no evidence of abscess.  There was some cobblestoning and some concern for fluid around the tendon sheath.  X-ray shows no acute fracture or dislocation.  Bedside ultrasound did not reveal any retained foreign body.  I spoke with hand surgeon, they recommended IV antibiotics, splint, follow-up in clinic this morning ().  Patient was given 2 g of ceftriaxone for empiric antibiotic coverage, the initial antibiotics of choice was Ancef given the patient has a penicillin allergy ceftriaxone was the safer option as its a 3rd generation compared to 1st generation cephalosporin.  Patient tolerated ceftriaxone  without difficulty.  Patient felt improved in the resting hand splint.  He was given the number for the orthopedic surgery clinic.  Advised to stay n.p.o.  Plan for orthopedic follow-up today (5/20/20) and possible operative management.  Discussed this with patient who agrees.  Patient prefers to go home at this time and does not wish to be admitted.  As we are able to arrange close clinic follow-up on the same day this seems reasonable and orthopedics agrees with plan.  Patient was discharged.    Diagnosis:    ICD-10-CM    1. Pain of finger of right hand  M79.644    2. Puncture wound  T14.8XXA        Disposition:  Discharged to home.    Discharge Medications:  New Prescriptions    OXYCODONE (ROXICODONE) 5 MG TABLET    Take 1 tablet (5 mg) by mouth every 6 hours as needed for pain       Scribe Disclosure:  I, Luis Carter, am serving as a scribe at 11:27 PM on 5/19/2020 to document services personally performed by Keira Gamboa MD based on my observations and the provider's statements to me.      Keira Gamboa MD  05/20/20 0437

## 2020-05-20 NOTE — ANESTHESIA POSTPROCEDURE EVALUATION
Patient: Emir Angela    Procedure(s):  Irrigation and debridement right long finger flexor sheath    Diagnosis:Infection [B99.9]  Diagnosis Additional Information: No value filed.    Anesthesia Type:  General    Note:  Anesthesia Post Evaluation    Patient location during evaluation: PACU  Patient participation: Able to fully participate in evaluation  Level of consciousness: awake  Pain management: adequate  Airway patency: patent  Cardiovascular status: acceptable  Respiratory status: acceptable  Hydration status: acceptable  PONV: controlled     Anesthetic complications: None          Last vitals:  Vitals:    05/20/20 1720 05/20/20 1730 05/20/20 1745   BP: 116/86 (!) 121/91 117/81   Pulse: 73 61    Resp: 10 9 12   Temp:      SpO2: 100% 100% 100%         Electronically Signed By: Osbaldo Ballesteros MD  May 20, 2020  6:09 PM

## 2020-05-20 NOTE — BRIEF OP NOTE
[facility]    Brief Operative Note    Pre-operative diagnosis: Infection [B99.9]  Post-operative diagnosis INFECTED FLEXOR SHEATH    Procedure: Procedure(s):  Irrigation and debridement right long finger flexor sheath  Surgeon: Surgeon(s) and Role:     * Jeannine Escobar MD - Primary     * Gosia Alba PA-C - Assisting  Anesthesia: General   Estimated blood loss: Less than 10 ml  Drains: CATHETER drain in sheath  Specimens:   ID Type Source Tests Collected by Time Destination   1 : right long finger soft tissue # 1  Fluid Hand, Right ANAEROBIC BACTERIAL CULTURE, FLUID CULTURE AEROBIC BACTERIAL, GRAM STAIN Jeannine Escobar MD 5/20/2020  4:24 PM    2 : right long finger soft tissue # 2  Fluid Hand, Right ANAEROBIC BACTERIAL CULTURE, FLUID CULTURE AEROBIC BACTERIAL, GRAM STAIN Jeannine Escobar MD 5/20/2020  4:25 PM    3 : right long finger flexor tendon sheath  Fluid Hand, Right ANAEROBIC BACTERIAL CULTURE, FLUID CULTURE AEROBIC BACTERIAL, GRAM STAIN Jeannine Escobar MD 5/20/2020  4:26 PM      Findings:   cloudy purulent fluid in sheath and soft tissues.  Complications: None.  Implants: * No implants in log *

## 2020-05-20 NOTE — ED TRIAGE NOTES
Puncture wound from degroot thorn plant on left middle finger is red swollen and painful, happened about 4 hours ago.

## 2020-05-21 LAB
BASOPHILS # BLD AUTO: 0 10E9/L (ref 0–0.2)
BASOPHILS NFR BLD AUTO: 0.2 %
DIFFERENTIAL METHOD BLD: NORMAL
EOSINOPHIL # BLD AUTO: 0 10E9/L (ref 0–0.7)
EOSINOPHIL NFR BLD AUTO: 0.1 %
ERYTHROCYTE [DISTWIDTH] IN BLOOD BY AUTOMATED COUNT: 12.6 % (ref 10–15)
GLUCOSE SERPL-MCNC: 119 MG/DL (ref 70–99)
HCT VFR BLD AUTO: 43.7 % (ref 40–53)
HGB BLD-MCNC: 14.3 G/DL (ref 13.3–17.7)
IMM GRANULOCYTES # BLD: 0 10E9/L (ref 0–0.4)
IMM GRANULOCYTES NFR BLD: 0.4 %
LYMPHOCYTES # BLD AUTO: 1.3 10E9/L (ref 0.8–5.3)
LYMPHOCYTES NFR BLD AUTO: 15.6 %
MCH RBC QN AUTO: 29.6 PG (ref 26.5–33)
MCHC RBC AUTO-ENTMCNC: 32.7 G/DL (ref 31.5–36.5)
MCV RBC AUTO: 91 FL (ref 78–100)
MONOCYTES # BLD AUTO: 0.6 10E9/L (ref 0–1.3)
MONOCYTES NFR BLD AUTO: 6.6 %
NEUTROPHILS # BLD AUTO: 6.4 10E9/L (ref 1.6–8.3)
NEUTROPHILS NFR BLD AUTO: 77.1 %
NRBC # BLD AUTO: 0 10*3/UL
NRBC BLD AUTO-RTO: 0 /100
PLATELET # BLD AUTO: 274 10E9/L (ref 150–450)
RBC # BLD AUTO: 4.83 10E12/L (ref 4.4–5.9)
WBC # BLD AUTO: 8.3 10E9/L (ref 4–11)

## 2020-05-21 PROCEDURE — 36415 COLL VENOUS BLD VENIPUNCTURE: CPT | Performed by: ORTHOPAEDIC SURGERY

## 2020-05-21 PROCEDURE — 25000132 ZZH RX MED GY IP 250 OP 250 PS 637: Performed by: ORTHOPAEDIC SURGERY

## 2020-05-21 PROCEDURE — 40000893 ZZH STATISTIC PT IP EVAL DEFER

## 2020-05-21 PROCEDURE — 12000011 ZZH R&B MS OVERFLOW

## 2020-05-21 PROCEDURE — 82947 ASSAY GLUCOSE BLOOD QUANT: CPT | Performed by: ORTHOPAEDIC SURGERY

## 2020-05-21 PROCEDURE — 85025 COMPLETE CBC W/AUTO DIFF WBC: CPT | Performed by: ORTHOPAEDIC SURGERY

## 2020-05-21 PROCEDURE — 25000128 H RX IP 250 OP 636: Performed by: ORTHOPAEDIC SURGERY

## 2020-05-21 RX ORDER — ACETAMINOPHEN 325 MG/1
650 TABLET ORAL EVERY 6 HOURS PRN
Qty: 20 TABLET | Refills: 0 | Status: SHIPPED | OUTPATIENT
Start: 2020-05-21 | End: 2020-06-22

## 2020-05-21 RX ORDER — OXYCODONE HYDROCHLORIDE 5 MG/1
5-10 TABLET ORAL EVERY 4 HOURS PRN
Qty: 12 TABLET | Refills: 0 | Status: SHIPPED | OUTPATIENT
Start: 2020-05-21 | End: 2020-06-22

## 2020-05-21 RX ORDER — ASPIRIN 325 MG
325 TABLET, DELAYED RELEASE (ENTERIC COATED) ORAL DAILY
Qty: 14 TABLET | Refills: 0 | Status: SHIPPED | OUTPATIENT
Start: 2020-05-22 | End: 2020-06-22

## 2020-05-21 RX ADMIN — CEFAZOLIN SODIUM 1 G: 1 INJECTION, SOLUTION INTRAVENOUS at 08:35

## 2020-05-21 RX ADMIN — ACETAMINOPHEN 975 MG: 325 TABLET, FILM COATED ORAL at 00:07

## 2020-05-21 RX ADMIN — ACETAMINOPHEN 975 MG: 325 TABLET, FILM COATED ORAL at 16:57

## 2020-05-21 RX ADMIN — CEFAZOLIN SODIUM 1 G: 1 INJECTION, SOLUTION INTRAVENOUS at 00:06

## 2020-05-21 RX ADMIN — IBUPROFEN 600 MG: 600 TABLET ORAL at 15:19

## 2020-05-21 RX ADMIN — ASPIRIN 325 MG: 325 TABLET, DELAYED RELEASE ORAL at 08:37

## 2020-05-21 RX ADMIN — ACETAMINOPHEN 975 MG: 325 TABLET, FILM COATED ORAL at 08:37

## 2020-05-21 RX ADMIN — IBUPROFEN 600 MG: 600 TABLET ORAL at 04:54

## 2020-05-21 NOTE — PROGRESS NOTES
"PRIMARY DIAGNOSIS: IRRIGATION AND DEBRIDEMENT OF RIGHT LONG FINGER FLEXOR SHEATH  OUTPATIENT/OBSERVATION GOALS TO BE MET BEFORE DISCHARGE:  1. Stable vital signs Yes  2. Tolerating diet:Yes  3. Pain controlled with oral pain medications:  Yes  4. Positive bowel sounds:  Yes  5. Voiding without difficulty:  Yes  6. Able to ambulate:  Yes  7. Provider specific discharge goals met:  Yes        Discharge Planner Nurse      Safe discharge environment identified: Yes  Barriers to discharge: Yes       Entered by: Diana Johnson 05/21/2020 4679     Please review provider order for any additional goals.   Nurse to notify provider when observation goals have been met and patient is ready for discharge.     Patient is alert and oriented x4. VS WNL and documented on the FS. Lung sounds clear in all lobes and patient is on RA. Denies SOB. Active bowel sounds in all 4 quadrants (per patient statement) LBM on Tuesday. Patient denies any pain, urgency, and frequency when voiding. Patient rating (R) hand incisional pain a 3-4/10 and is being treated with Tylenol and Ibuprofen. CRISTINA (R) hand incision related to dressing. Capillary refill (R) hand <3 seconds. Patient denies numbness and tingling in (R) hand. SL, except for abx Ancef Q8hrs. BC pending. I&D following. Ortho following.     Plan: Continue ASA for DVT prophylaxis.              Light /grasp             Ortho PA to change dressings and pull drain                   tomorrow            Abx per ID recommendation            Follow-up with Dr Escobar next week    /79 (BP Location: Left arm)   Pulse 52   Temp 96.5  F (35.8  C) (Oral)   Resp 16   Ht 1.778 m (5' 10\")   Wt 69.7 kg (153 lb 9.6 oz)   SpO2 99%   BMI 22.04 kg/m        "

## 2020-05-21 NOTE — PROGRESS NOTES
"PRIMARY DIAGNOSIS: IRRIGATION AND DEBRIDEMENT OF RIGHT LONG FINGER FLEXOR SHEATH  OUTPATIENT/OBSERVATION GOALS TO BE MET BEFORE DISCHARGE:  1. Stable vital signs Yes  2. Tolerating diet:Yes  3. Pain controlled with oral pain medications:  Yes  4. Positive bowel sounds:  Yes  5. Voiding without difficulty:  Yes  6. Able to ambulate:  Yes  7. Provider specific discharge goals met:  Yes    Discharge Planner Nurse   Safe discharge environment identified: Yes  Barriers to discharge: Yes       Entered by: Diana Johnson 05/21/2020 9:20 AM     Please review provider order for any additional goals.   Nurse to notify provider when observation goals have been met and patient is ready for discharge.    Patient is alert and oriented x4. VS WNL and documented on the FS. Lung sounds clear in all lobes and patient is on RA. Denies SOB. Active bowel sounds in all 4 quadrants (per patient statement) LBM on Tuesday. Patient denies any pain, urgency, and frequency when voiding. Patient rating (R) hand incisional pain a 3-4/10 and is being treated with Tylenol and Ibuprofen. CRISTINA (R) hand incision related to dressing. Capillary refill (R) hand <3 seconds. Patient denies numbness and tingling in (R) hand. SL, except for abx Ancef Q8hrs. BC pending. I&D following. Ortho following.     BP 99/61 (BP Location: Left arm)   Pulse 52   Temp 97.1  F (36.2  C) (Oral)   Resp 16   Ht 1.778 m (5' 10\")   Wt 69.7 kg (153 lb 9.6 oz)   SpO2 99%   BMI 22.04 kg/m       "

## 2020-05-21 NOTE — PLAN OF CARE
Discharge Planner OT   Patient plan for discharge: Home  Current status: OT order received, chart reviewed. Per chart review and discussion with PT, pt denies ADL concerns, has been able to compensate with use of L hand. Pt up independently. Skilled IP OT eval not indicated.   Barriers to return to prior living situation: N/A  Recommendations for discharge: Home  Rationale for recommendations: No IP OT needs identified. OT order completed.        Entered by: Emily Hoffman 05/21/2020 10:24 AM

## 2020-05-21 NOTE — PLAN OF CARE
Discharge Planner PT   Patient plan for discharge: home  Current status: Orders received, chart reviewed, spoke with pt. Introduced role of IP PT/OT. Pt denies mobility or ADL concerns. He reports he has been able to compensate with his L hand. Per RN notes pt has been mobilizing with independence.   Barriers to return to prior living situation: None.   Recommendations for discharge: Home.   Rationale for recommendations: No IP PT needs identified. Will complete order.        Entered by: Yodit Reid 05/21/2020 9:16 AM

## 2020-05-21 NOTE — PLAN OF CARE
Irrigation and debridement right long finger flexor sheath  Vitals:  Temp: 96.6  F (35.9  C) Temp src: Oral BP: 119/71 Pulse: 52 Heart Rate: 73 Resp: 16 SpO2: 99 % O2 Device: None (Room air)   Neuro: A&Ox4  Lungs: WDL  Cardiac: WDL  GI: BS+ denies nausea  : Voiding adequately   Skin: WDL- brace to Rt hand UTV incision. CMS intact  IV: PIV SL  Labs: Hgb:14.1  Diet: tolerating regular  Pain: 3/10 to incision, scheduled tylenol and PRN ibuprofen given.  Activity: Ind up to the bathroom x2  Plan: IV abx, ID following

## 2020-05-21 NOTE — PHARMACY-ADMISSION MEDICATION HISTORY
Admission medication history interview status for this patient is complete. See [facility] admission navigator for allergy information, prior to admission medications and immunization status.     Medication history interview done via telephone during Covid-19 pandemic, indicate source(s): Patient  Medication history resources (including written lists, pill bottles, clinic record):None    Changes made to PTA medication list:  Added: desonide  Deleted: azithromycin, famotidine, ranitidine, westcort, day time cold and flu  Changed: none    Actions taken by pharmacist (provider contacted, etc):None     Additional medication history information:None    Medication reconciliation/reorder completed by provider prior to medication history?  Y   (Y/N)     For patients on insulin therapy: N  (Y/N)      Prior to Admission medications    Medication Sig Last Dose Taking? Auth Provider   acetaminophen (TYLENOL) 500 MG tablet Take 1,000 mg by mouth every 8 hours as needed for mild pain  at prn Yes Unknown, Entered By History   desonide (DESOWEN) 0.05 % external cream Apply topically 2 times daily 5/20/2020 at am Yes Unknown, Entered By History   ibuprofen (ADVIL/MOTRIN) 200 MG tablet Take 600 mg by mouth every 8 hours as needed for mild pain  at prn Yes Unknown, Entered By History   oxyCODONE (ROXICODONE) 5 MG tablet Take 1 tablet (5 mg) by mouth every 6 hours as needed for pain  at not started yet  Keira Gamboa MD

## 2020-05-21 NOTE — CONSULTS
Consult Date:  2020      INFECTIOUS DISEASE CONSULTATION      LOCATION:  Room 206, [facility]      REFERRING PHYSICIAN:  Dr. Delaney Escobar.      IMPRESSION:   1.  Healthy 35-year-old male with acute puncture wound-related tenosynovitis of right third finger tendon sheath, cultures are pending.  No clinical sepsis.   2.  PENICILLIN AND SULFA ALLERGIES.      RECOMMENDATIONS:  Ancef for now, likely IV course is indicated here, although await culture, wait 1 day on line placement.  Discussed in detail with the patient.      HISTORY:  This 35-year-old male seen in consultation due to a right third finger presumed tenosynovitis.  The patient was clearing some buckthorn and punctured his finger and within 2 hours had significant inflammation and swelling that then progressively worsened.  No significant fevers, chills or sweats.  No other involved site.  He has not had a history of major infections, no known MRSA or anything similar.      PAST MEDICAL HISTORY:  Fairly unremarkable, healthy person in general.      ALLERGIES:  PENICILLIN AND SULFA, BOTH RASHES IN THE PAST, TOLERATING CEPHALOSPORINS CURRENTLY.      MEDICATIONS:  As listed.      REVIEW OF SYSTEMS:  No other systemic symptoms.  Feels okay in general.      PHYSICAL EXAMINATION:   GENERAL:  The patient appears his stated age, does not look toxic or ill.   VITAL SIGNS:  Normal.   HEART AND LUNGS:  Unremarkable.   ABDOMEN:  Soft, nontender.   EXTREMITIES:  The right hand is wrapped.  No signs of proximal infection.      LABORATORY:  Cultures are all pending, washout noted.      Thank you very much for consultation.  I will follow the patient with you.         VIVIAN MERCEDES MD             D: 2020   T: 2020   MT: PK      Name:     PREETI MINER   MRN:      [id number]        Account:       [id number]   :      1985           Consult Date:  2020      Document: [id number]       cc: Jeannine Escobar MD

## 2020-05-21 NOTE — PROGRESS NOTES
Per ID note by Dr. Plascencia, likely IV abx course but waiting on cultures to come back. Contacted [facility] ([phone]) for benefit check. Await return call.     CM will continue to follow patient until discharge for any additional needs.     Kristen Curtis RN, BSN, CPHN, CM  Inpatient Care Coordination  [facility]  [phone]    Addendum 2:30pm - CM received return call from [facility]. Patient has $400 deductible (not met); when met, he has coverage at 85% to a maximum out of pocket of $2,500 (He has met $79 so far). Waiting for final determination of drug to discuss OP vs home infusion with patient. lilia

## 2020-05-21 NOTE — PLAN OF CARE
"PRIMARY DIAGNOSIS: IRRIGATION AND DEBRIDEMENT OF RIGHT LONG FINGER FLEXOR SHEATH  OUTPATIENT/OBSERVATION GOALS TO BE MET BEFORE DISCHARGE:  1. ADLs back to baseline: No    2. Activity and level of assistance: Ambulating independently.    3. Pain status:  Patient c/o of only minor discomfort    4. Return to near baseline physical activity: No    Blood pressure 116/81, pulse 52, temperature 95.3  F (35.2  C), temperature source Oral, resp. rate 10, height 1.778 m (5' 10\"), weight 69.7 kg (153 lb 9.6 oz), SpO2 99 %.    VSS  LS clear, adequate sats on RA.  ETCO2 38-40, IPI 8-9.  Patient c/o of only minor discomfort at this time.  Ace wrap dressing on right hand dry, intact.  CMS to affected upper extremity also intact. Patient tolerating clear liquids, later advanced to regular.  IV fluids discontinued as patient is taking adequate oral liquids.  Patient is standby assist, noted to be independently ambulating in the hallways.  Plan:  Continue to provide support cares,      Discharge Planner Nurse   Safe discharge environment identified: Yes  Barriers to discharge: Yes, unless medically cleared         Entered by: Karen M. Lesch 05/20/2020 9:18 PM     Please review provider order for any additional goals.   Nurse to notify provider when observation goals have been met and patient is ready for discharge.  "

## 2020-05-21 NOTE — PROGRESS NOTES
Orthopedic Surgery  Emir Angela  2020  Admit Date:  2020  POD: 1 Day Post-Op   Procedure(s):  Irrigation and debridement of the right long finger and its flexor sheath    Alert and oriented to person, place, and time.  Patient resting comfortably in bed.    Pain controlled with Tylenol and ibuprofen  Tolerating oral intake.    Denies nausea or vomiting  Denies chest pain or shortness of breath  Expresses concern with pain in 3rd-4th webspace.     Vital Sign Ranges  Temperature Temp  Av.7  F (35.9  C)  Min: 95.1  F (35.1  C)  Max: 98.2  F (36.8  C)   Blood pressure Systolic (24hrs), Av , Min:99 , Max:121        Diastolic (24hrs), Av, Min:45, Max:91      Pulse Pulse  Av.6  Min: 52  Max: 73   Respirations Resp  Av.9  Min: 9  Max: 140   Pulse oximetry SpO2  Av.2 %  Min: 95 %  Max: 100 %       Dressing is clean, dry, and intact.   Alumafoam splint in place  Sensation and pulses intact RUE  Able to gently flex long finger in splint with decreased pain  Full resistance with flexion of right ring finger without pain.   No increase in erythema along right ring finger      Labs:  Recent Labs   Lab Test 20  0016   POTASSIUM 3.8     Recent Labs   Lab Test 20  0528 20  0016   HGB 14.3 14.1     No results for input(s): INR in the last [id number] hours.  Recent Labs   Lab Test 20  0528 20  0016    265       1. PLAN:   Continue ASA for DVT prophylaxis.     Light /grasp    Ortho PA to change dressings and pull drain tomorrow   Abx per ID recommendation   Follow-up with Dr Escobar next week    2. Disposition   Anticipate d/c to home tomorrow.    Cynthia Parsons PA-C

## 2020-05-21 NOTE — CONSULTS
ID consult dictated IMP 1 36 yo male puncture R 3rd finger rapid onset infection, wasgout tendon shathe cxs pending    REC ancef, likely IV course await cx

## 2020-05-21 NOTE — OP NOTE
Procedure Date: 05/20/2020      PREOPERATIVE DIAGNOSIS:  Right long finger infected flexor sheath.      POSTOPERATIVE DIAGNOSIS:  Right long finger infected flexor sheath.      PROCEDURE:  I and D of the right long finger and its flexor sheath.      SURGEON:  Jeannine Escobar MD      ASSISTANT:   Gosia Alba PA-C      ANESTHESIA:   General      TOURNIQUET TIME:  Under 30 minutes      Surgical site was signed by me and confirmed at the timeout.  Informed consent was obtained in the holding and confirmed prior to entrance to the operating room and prophylaxis antibiotics were not given due to the nature of the case.      DESCRIPTION OF PROCEDURE:   He was brought to the operating room.  His right arm was prepped and draped in the normal standard manner.  He had a box thorn that entered his right long finger over the proximal phalanx off the midline over the flexor sheath on the radial side.  He had developed swollen and tenderness in that flexor sheath.  Within 5 hours was in the emergency room last evening with tender flexor sheath.  He was n.p.o.  Came into our office today.  Exam demonstrated tenderness over the flexor sheath at the MP joint, proximal phalanx and the middle phalanx.  He was swollen and tender and pain with full extension.  The risks and benefits of the procedure were described to the patient and he fully consented.  He was brought to the operating room, given anesthesia, and then his hand was prepped and draped in the normal standard manner.  I made an oblique 1 cm incision over the A1 pulley just distal to the MP crease in the palm.  On the finger I made a V-type incision with the apex ulnar at the PIP crease in the palm and carried back over the proximal phalanx.  The skin was elevated up and there was a lot of cloudy, serosanguinous fluid in the soft tissues so I placed a Diprivan IV catheter down the finger subcutaneous area and spread that with a small snap.  Irrigated out soft tissue since  it was very cloudy.  I did do cultures of this cloudy fluid first, do did aerobic, anaerobic, and Gram stain.  After irrigating out copiously with several 100 mL of antibiotic solution, I then made a small cut in the A1 pulley and there was winnie kind of pus, cloudy fluid in that flexor sheath, so I released the entirely of the A1 pulley.  I took cultures from the flexor sheath and then I placed IV tubing down the sheath and irrigated that out copiously as well.  I left this IV tubing in the tendon sheath.  I irrigated out one more time with Marcaine after irrigating it with several 100 mL of fluid and then closed the skin with chromic.  A sterile dressing was applied over the drains and a small splint applied.  The plan will be for the splint and drain to be removed and him to begin range of motion in 48 hours.  We will treat him with IV antibiotics over the next 24 hours as we are awaiting cultures.            HOMERO CARRERA MD             D: 2020   T: 2020   MT: ESTEVAN      Name:     PREETI MINER   MRN:      [id number]        Account:        [id number]   :      1985           Procedure Date: 2020      Document: [id number]

## 2020-05-22 ENCOUNTER — HOME INFUSION (PRE-WILLOW HOME INFUSION) (OUTPATIENT)
Dept: PHARMACY | Facility: CLINIC | Age: 35
End: 2020-05-22

## 2020-05-22 VITALS
TEMPERATURE: 96.3 F | HEART RATE: 52 BPM | SYSTOLIC BLOOD PRESSURE: 117 MMHG | HEIGHT: 70 IN | DIASTOLIC BLOOD PRESSURE: 74 MMHG | BODY MASS INDEX: 21.99 KG/M2 | OXYGEN SATURATION: 100 % | WEIGHT: 153.6 LBS | RESPIRATION RATE: 16 BRPM

## 2020-05-22 LAB
CRP SERPL-MCNC: <2.9 MG/L (ref 0–8)
ERYTHROCYTE [SEDIMENTATION RATE] IN BLOOD BY WESTERGREN METHOD: 4 MM/H (ref 0–15)
GLUCOSE SERPL-MCNC: 97 MG/DL (ref 70–99)
HGB BLD-MCNC: 13.5 G/DL (ref 13.3–17.7)

## 2020-05-22 PROCEDURE — 36415 COLL VENOUS BLD VENIPUNCTURE: CPT | Performed by: ORTHOPAEDIC SURGERY

## 2020-05-22 PROCEDURE — 36415 COLL VENOUS BLD VENIPUNCTURE: CPT | Performed by: INTERNAL MEDICINE

## 2020-05-22 PROCEDURE — 82947 ASSAY GLUCOSE BLOOD QUANT: CPT | Performed by: ORTHOPAEDIC SURGERY

## 2020-05-22 PROCEDURE — 85652 RBC SED RATE AUTOMATED: CPT | Performed by: INTERNAL MEDICINE

## 2020-05-22 PROCEDURE — 86140 C-REACTIVE PROTEIN: CPT | Performed by: INTERNAL MEDICINE

## 2020-05-22 PROCEDURE — 27211406 ZZ H KIT CATH IV 18 OR 20G CM, POWERGLIDE W MAX BARRIER

## 2020-05-22 PROCEDURE — 25000132 ZZH RX MED GY IP 250 OP 250 PS 637: Performed by: ORTHOPAEDIC SURGERY

## 2020-05-22 PROCEDURE — 85018 HEMOGLOBIN: CPT | Performed by: ORTHOPAEDIC SURGERY

## 2020-05-22 PROCEDURE — 25000128 H RX IP 250 OP 636: Performed by: ORTHOPAEDIC SURGERY

## 2020-05-22 PROCEDURE — 36569 INSJ PICC 5 YR+ W/O IMAGING: CPT

## 2020-05-22 PROCEDURE — 25000128 H RX IP 250 OP 636: Performed by: INTERNAL MEDICINE

## 2020-05-22 RX ORDER — CEFAZOLIN SODIUM 2 G/100ML
2 INJECTION, SOLUTION INTRAVENOUS EVERY 8 HOURS
Status: DISCONTINUED | OUTPATIENT
Start: 2020-05-22 | End: 2020-05-22

## 2020-05-22 RX ORDER — CEFAZOLIN SODIUM 1 G/3ML
2 INJECTION, POWDER, FOR SOLUTION INTRAMUSCULAR; INTRAVENOUS EVERY 8 HOURS
Qty: 1 EACH | Refills: 1 | Status: SHIPPED | OUTPATIENT
Start: 2020-05-22 | End: 2020-07-01

## 2020-05-22 RX ORDER — CEFAZOLIN SODIUM 2 G/100ML
2 INJECTION, SOLUTION INTRAVENOUS EVERY 8 HOURS
Status: DISCONTINUED | OUTPATIENT
Start: 2020-05-22 | End: 2020-05-22 | Stop reason: HOSPADM

## 2020-05-22 RX ADMIN — CEFAZOLIN SODIUM 2 G: 2 INJECTION, SOLUTION INTRAVENOUS at 14:56

## 2020-05-22 RX ADMIN — IBUPROFEN 600 MG: 600 TABLET ORAL at 00:10

## 2020-05-22 RX ADMIN — ACETAMINOPHEN 975 MG: 325 TABLET, FILM COATED ORAL at 08:24

## 2020-05-22 RX ADMIN — CEFAZOLIN SODIUM 2 G: 2 INJECTION, SOLUTION INTRAVENOUS at 09:02

## 2020-05-22 RX ADMIN — ASPIRIN 325 MG: 325 TABLET, DELAYED RELEASE ORAL at 08:24

## 2020-05-22 NOTE — PROCEDURES
[facility]    Single Lumen Midline Placement    Date/Time: 5/22/2020 2:45 PM  Performed by: Danish Hinojosa RN  Authorized by: Wilfrid Plascencia MD     UNIVERSAL PROTOCOL   Site Marked: Yes  Prior Images Obtained and Reviewed:  Yes  Required items: Required blood products, implants, devices and special equipment available    Patient identity confirmed:  Verbally with patient, arm band and hospital-assigned identification number  Patient was reevaluated immediately before administering moderate or deep sedation or anesthesia  Confirmation Checklist:  Patient's identity using two indicators, relevant allergies, procedure was appropriate and matched the consent or emergent situation and correct equipment/implants were available  Time out: Immediately prior to the procedure a time out was called    Universal Protocol: the Joint Commission Universal Protocol was followed    Preparation: Patient was prepped and draped in usual sterile fashion           ANESTHESIA    Anesthesia: Local infiltration  Local Anesthetic:  Lidocaine 1% without epinephrine  Anesthetic Total (mL):  1      SEDATION    Patient Sedated: No        Preparation: skin prepped with 2% chlorhexidine  Skin prep agent: skin prep agent completely dried prior to procedure  Sterile barriers: maximum sterile barriers were used: cap, mask, sterile gown, sterile gloves, and large sterile sheet  Hand hygiene: hand hygiene performed prior to central venous catheter insertion  Type of line used: Midline  Catheter type: single lumen  Lumen type: non-valved  Catheter size: 18g.  Brand: Bard  Lot number: [id number]  Placement method: venipuncture and ultrasound  Number of attempts: 1  Successful placement: yes  Orientation: left  Location: brachial vein (medial)  Arm circumference: adults 10 cm  Extremity circumference: 26  Visible catheter length: 0  Internal length: 10 cm  Total catheter length: 10  Dressing and securement: chlorhexidine disc applied,  dressing applied, fixation device, securement device and occlusive dressing applied  Post procedure assessment: blood return through all ports  PROCEDURE   Patient Tolerance:  Patient tolerated the procedure well with no immediate complications     Midline ok to use

## 2020-05-22 NOTE — PROGRESS NOTES
Emir Angela  5/22/2020  POD #2 I & D right long finger tendon sheath    Pain continues to improve.  Notes some surgical soreness.  States swelling improved.  Dressings removed.  Incisions are healing well.  No drainage noted.  No drainage from the drain.  Drain pulled without difficulty.  Able to flex and extend the long finger with minimal discomfort.  Wounds redressed  Ok to discharge to home today from ortho standpoint    Mita TYSON

## 2020-05-22 NOTE — PROGRESS NOTES
"PRIMARY DIAGNOSIS: IRRIGATION AND DEBRIDEMENT OF RIGHT LONG FINGER FLEXOR SHEATH  OUTPATIENT/OBSERVATION GOALS TO BE MET BEFORE DISCHARGE:  1. Stable vital signs Yes  2. Tolerating diet:Yes  3. Pain controlled with oral pain medications:  Yes  4. Positive bowel sounds:  Yes  5. Voiding without difficulty:  Yes  6. Able to ambulate:  Yes  7. Provider specific discharge goals met:  Yes        Discharge Planner Nurse      Safe discharge environment identified: Yes  Barriers to discharge: Yes       Entered by: Diana Johnson 05/22/2020 1400     Please review provider order for any additional goals.   Nurse to notify provider when observation goals have been met and patient is ready for discharge.     Patient is alert and oriented x4. VS WNL and documented on the FS. Lung sounds clear in all lobes and patient is on RA. Denies SOB. Active bowel sounds in all 4 quadrants. Patient denies any pain, urgency, and frequency when voiding. Patient rating (R) hand incisional pain a 2-3/10 and is being treated with Tylenol and Ibuprofen. Dressing and drain removed this morning. Sutures in place. x2 incisions. PA from orthopedics did dressing change.  Capillary refill (R) hand <3 seconds. Patient able to flex and extend long finger with minimal discomfort. Patient denies numbness and tingling in (R) hand. SL, except for abx Ancef Q8hrs. BC pending. I&D following. Ortho following. Plan: Place midline, one more dose of Ancef, and patient will discharge with [facility].     /74 (BP Location: Right arm)   Pulse 52   Temp 96.3  F (35.7  C) (Oral)   Resp 16   Ht 1.778 m (5' 10\")   Wt 69.7 kg (153 lb 9.6 oz)   SpO2 100%   BMI 22.04 kg/m      "

## 2020-05-22 NOTE — PROGRESS NOTES
Talked to Dr. Escobar regarding abx Ancef. Ancef was stopped at 1600 yesterday. Verbal order placed to restart Ancef Q8hrs.

## 2020-05-22 NOTE — PROGRESS NOTES
"PRIMARY DIAGNOSIS: IRRIGATION AND DEBRIDEMENT OF RIGHT LONG FINGER FLEXOR SHEATH  OUTPATIENT/OBSERVATION GOALS TO BE MET BEFORE DISCHARGE:  1. Stable vital signs Yes  2. Tolerating diet:Yes  3. Pain controlled with oral pain medications:  Yes  4. Positive bowel sounds:  Yes  5. Voiding without difficulty:  Yes  6. Able to ambulate:  Yes  7. Provider specific discharge goals met:  Yes    Blood pressure 114/65, pulse 52, temperature 96.7  F (35.9  C), temperature source Oral, resp. rate 18, height 1.778 m (5' 10\"), weight 69.7 kg (153 lb 9.6 oz), SpO2 98 %.    VSS.  Patient rating his right hand/post surgical pain, rating his pain a 2-3 on a 1/10 scale.  Right hand dressing dry, intact.  CMS to affected extremity intact.  Patient denies any numbness or tingling.  Patient is up independently in the room, noted to be ambulating the unit halls. Plan:  Continue to provide supportive cares.  Cultures pending. Infectious Disease following.  Ortho continues to follow.       Discharge Planner Nurse   Safe discharge environment identified: Yes  Barriers to discharge: Yes, unless medically cleared       Entered by: Karen M. Lesch 05/21/2020 20:00 PM     Please review provider order for any additional goals.   Nurse to notify provider when observation goals have been met and patient is ready for discharge.              "

## 2020-05-22 NOTE — PLAN OF CARE
Patient's After Visit Summary was reviewed with patient.  Patient verbalized understanding of After Visit Summary, recommended follow up and was given an opportunity to ask questions.   Discharge medications sent home with patient/family: YES, aspirin, oxy and tylenol.   Discharged with spouse via private vehicle.       OBSERVATION patient END time: 3:58 PM

## 2020-05-22 NOTE — PROGRESS NOTES
CM aware patient has discharge order. Contacted I ([phone]) to update on patient's discharge.     Met with patient at bedside. Explained sequence of events (midline placement, [facility] to educate either at [facility] or at patient's home). Waiting to see if [facility] can start with patient today or tomorrow. Awaiting return call. Explained benefit check results. Patient is agreeable to out of pocket costs. Need to update patient when [facility] decides if they can start with patient today.     CM will continue to follow patient until discharge for any additional needs.     Kristen Curtis RN, BSN, CPHN, CM  Inpatient Care Coordination  [facility]  [phone]

## 2020-05-22 NOTE — PROGRESS NOTES
Home Infusion  Emir will be discharging today and going home on IV Ancef 2g q8 hrs.    Met with Emir at bedside and instructed in IV administration via IV push with SASH flushing.   Had him perform hands on with practice equipment and teaching sheets.    Provided information about supplies and supply delivery, storage of medication, checking of label, dosing times, plan for SNV and 24/7 availability of I staff.   Emir verbalized understanding of information given.  He demonstrated very good technique with practice and verbalized good understanding of process.  Stated he feels comfortable with administering his abx later tonight.  Emir is ready for discharge from [facility] perspective.    Sylvia Rick RN  [facility] Liaison  [phone] (M-F 8a-5p)  [phone] Office

## 2020-05-22 NOTE — PLAN OF CARE
VS:stable  Orientation: A/O x4  Glucose checks: POD2 AM check ordered  Activity: independent  Diet: regular  GI: WDL  : WDL  Respiratory: WDL  IV: SL  Plan: waiting for fluid cultures

## 2020-05-23 ENCOUNTER — HOME INFUSION (PRE-WILLOW HOME INFUSION) (OUTPATIENT)
Dept: PHARMACY | Facility: CLINIC | Age: 35
End: 2020-05-23

## 2020-05-24 ENCOUNTER — HOME INFUSION (PRE-WILLOW HOME INFUSION) (OUTPATIENT)
Dept: PHARMACY | Facility: CLINIC | Age: 35
End: 2020-05-24

## 2020-05-25 ENCOUNTER — HOME INFUSION (PRE-WILLOW HOME INFUSION) (OUTPATIENT)
Dept: PHARMACY | Facility: CLINIC | Age: 35
End: 2020-05-25

## 2020-05-25 LAB
BACTERIA SPEC CULT: NO GROWTH
Lab: NORMAL
SPECIMEN SOURCE: NORMAL

## 2020-05-26 ENCOUNTER — TELEPHONE (OUTPATIENT)
Dept: FAMILY MEDICINE | Facility: CLINIC | Age: 35
End: 2020-05-26

## 2020-05-26 NOTE — PROGRESS NOTES
This is a recent snapshot of the patient's [facility] medical record.  For current drug dose and complete information and questions, call [phone] or In Basket pool, fv home infusion ([id number])  CSN Number:  [id number]

## 2020-05-26 NOTE — PROGRESS NOTES
This is a recent snapshot of the patient's [facility] medical record.  For current drug dose and complete information and questions, call [phone] or In Basket pool, fv home infusion ([id number])  CSN Number:  [id number]

## 2020-05-26 NOTE — PROGRESS NOTES
This is a recent snapshot of the patient's [facility] medical record.  For current drug dose and complete information and questions, call [phone] or In Basket pool, fv home infusion ([id number])  CSN Number:  [id number]

## 2020-05-26 NOTE — TELEPHONE ENCOUNTER
ED/Discharge Protocol    No PCP listed.  Patient to follow up with Dr. Escobar at [facility]  Kellie Laird RN

## 2020-05-26 NOTE — PROGRESS NOTES
This is a recent snapshot of the patient's [facility] medical record.  For current drug dose and complete information and questions, call [phone] or In Basket pool, fv home infusion ([id number])  CSN Number:  [id number]

## 2020-05-27 ENCOUNTER — HOME INFUSION (PRE-WILLOW HOME INFUSION) (OUTPATIENT)
Dept: PHARMACY | Facility: CLINIC | Age: 35
End: 2020-05-27

## 2020-05-27 ENCOUNTER — MEDICAL CORRESPONDENCE (OUTPATIENT)
Dept: HEALTH INFORMATION MANAGEMENT | Facility: CLINIC | Age: 35
End: 2020-05-27

## 2020-05-27 LAB
AST SERPL W P-5'-P-CCNC: 33 U/L (ref 0–45)
BACTERIA SPEC CULT: NORMAL
BASOPHILS # BLD AUTO: 0 10E9/L (ref 0–0.2)
BASOPHILS NFR BLD AUTO: 0.8 %
BUN SERPL-MCNC: 20 MG/DL (ref 7–30)
CREAT SERPL-MCNC: 1.11 MG/DL (ref 0.66–1.25)
CRP SERPL-MCNC: <2.9 MG/L (ref 0–8)
DIFFERENTIAL METHOD BLD: ABNORMAL
EOSINOPHIL # BLD AUTO: 0.3 10E9/L (ref 0–0.7)
EOSINOPHIL NFR BLD AUTO: 7.7 %
ERYTHROCYTE [DISTWIDTH] IN BLOOD BY AUTOMATED COUNT: 12.8 % (ref 10–15)
ERYTHROCYTE [SEDIMENTATION RATE] IN BLOOD BY WESTERGREN METHOD: 4 MM/H (ref 0–15)
GFR SERPL CREATININE-BSD FRML MDRD: 85 ML/MIN/{1.73_M2}
HCT VFR BLD AUTO: 44.3 % (ref 40–53)
HGB BLD-MCNC: 14.7 G/DL (ref 13.3–17.7)
IMM GRANULOCYTES # BLD: 0 10E9/L (ref 0–0.4)
IMM GRANULOCYTES NFR BLD: 0 %
LYMPHOCYTES # BLD AUTO: 1.3 10E9/L (ref 0.8–5.3)
LYMPHOCYTES NFR BLD AUTO: 35.8 %
Lab: NORMAL
MCH RBC QN AUTO: 29.3 PG (ref 26.5–33)
MCHC RBC AUTO-ENTMCNC: 33.2 G/DL (ref 31.5–36.5)
MCV RBC AUTO: 88 FL (ref 78–100)
MONOCYTES # BLD AUTO: 0.5 10E9/L (ref 0–1.3)
MONOCYTES NFR BLD AUTO: 13.9 %
NEUTROPHILS # BLD AUTO: 1.5 10E9/L (ref 1.6–8.3)
NEUTROPHILS NFR BLD AUTO: 41.8 %
NRBC # BLD AUTO: 0 10*3/UL
NRBC BLD AUTO-RTO: 0 /100
PLATELET # BLD AUTO: 237 10E9/L (ref 150–450)
RBC # BLD AUTO: 5.01 10E12/L (ref 4.4–5.9)
SPECIMEN SOURCE: NORMAL
WBC # BLD AUTO: 3.7 10E9/L (ref 4–11)

## 2020-05-27 PROCEDURE — 85652 RBC SED RATE AUTOMATED: CPT | Performed by: INTERNAL MEDICINE

## 2020-05-27 PROCEDURE — 82565 ASSAY OF CREATININE: CPT | Performed by: INTERNAL MEDICINE

## 2020-05-27 PROCEDURE — 86140 C-REACTIVE PROTEIN: CPT | Performed by: INTERNAL MEDICINE

## 2020-05-27 PROCEDURE — 84450 TRANSFERASE (AST) (SGOT): CPT | Performed by: INTERNAL MEDICINE

## 2020-05-27 PROCEDURE — 84520 ASSAY OF UREA NITROGEN: CPT | Performed by: INTERNAL MEDICINE

## 2020-05-27 PROCEDURE — 85025 COMPLETE CBC W/AUTO DIFF WBC: CPT | Performed by: INTERNAL MEDICINE

## 2020-05-28 ENCOUNTER — HOME INFUSION (PRE-WILLOW HOME INFUSION) (OUTPATIENT)
Dept: PHARMACY | Facility: CLINIC | Age: 35
End: 2020-05-28

## 2020-05-28 NOTE — PROGRESS NOTES
This is a recent snapshot of the patient's [facility] medical record.  For current drug dose and complete information and questions, call [phone] or In Basket pool, fv home infusion ([id number])  CSN Number:  [id number]

## 2020-05-29 ENCOUNTER — HOME INFUSION (PRE-WILLOW HOME INFUSION) (OUTPATIENT)
Dept: PHARMACY | Facility: CLINIC | Age: 35
End: 2020-05-29

## 2020-05-29 ENCOUNTER — MEDICAL CORRESPONDENCE (OUTPATIENT)
Dept: HEALTH INFORMATION MANAGEMENT | Facility: CLINIC | Age: 35
End: 2020-05-29

## 2020-05-29 LAB
BASOPHILS # BLD AUTO: 0 10E9/L (ref 0–0.2)
BASOPHILS NFR BLD AUTO: 1 %
DIFFERENTIAL METHOD BLD: NORMAL
EOSINOPHIL # BLD AUTO: 0.2 10E9/L (ref 0–0.7)
EOSINOPHIL NFR BLD AUTO: 6.1 %
ERYTHROCYTE [DISTWIDTH] IN BLOOD BY AUTOMATED COUNT: 12.7 % (ref 10–15)
HCT VFR BLD AUTO: 42.4 % (ref 40–53)
HGB BLD-MCNC: 14.1 G/DL (ref 13.3–17.7)
IMM GRANULOCYTES # BLD: 0 10E9/L (ref 0–0.4)
IMM GRANULOCYTES NFR BLD: 0 %
LYMPHOCYTES # BLD AUTO: 1.5 10E9/L (ref 0.8–5.3)
LYMPHOCYTES NFR BLD AUTO: 38.5 %
MCH RBC QN AUTO: 29.5 PG (ref 26.5–33)
MCHC RBC AUTO-ENTMCNC: 33.3 G/DL (ref 31.5–36.5)
MCV RBC AUTO: 89 FL (ref 78–100)
MONOCYTES # BLD AUTO: 0.4 10E9/L (ref 0–1.3)
MONOCYTES NFR BLD AUTO: 10.1 %
NEUTROPHILS # BLD AUTO: 1.8 10E9/L (ref 1.6–8.3)
NEUTROPHILS NFR BLD AUTO: 44.3 %
NRBC # BLD AUTO: 0 10*3/UL
NRBC BLD AUTO-RTO: 0 /100
PLATELET # BLD AUTO: 229 10E9/L (ref 150–450)
RBC # BLD AUTO: 4.78 10E12/L (ref 4.4–5.9)
WBC # BLD AUTO: 4 10E9/L (ref 4–11)

## 2020-05-29 PROCEDURE — 85025 COMPLETE CBC W/AUTO DIFF WBC: CPT | Performed by: INTERNAL MEDICINE

## 2020-05-29 NOTE — PROGRESS NOTES
This is a recent snapshot of the patient's [facility] medical record.  For current drug dose and complete information and questions, call [phone] or In Basket pool, fv home infusion ([id number])  CSN Number:  [id number]

## 2020-05-31 ENCOUNTER — HOME INFUSION (PRE-WILLOW HOME INFUSION) (OUTPATIENT)
Dept: PHARMACY | Facility: CLINIC | Age: 35
End: 2020-05-31

## 2020-06-01 ENCOUNTER — HOME INFUSION (PRE-WILLOW HOME INFUSION) (OUTPATIENT)
Dept: PHARMACY | Facility: CLINIC | Age: 35
End: 2020-06-01

## 2020-06-01 NOTE — PROGRESS NOTES
This is a recent snapshot of the patient's [facility] medical record.  For current drug dose and complete information and questions, call [phone] or In Basket pool, fv home infusion ([id number])  CSN Number:  [id number]

## 2020-06-02 NOTE — PROGRESS NOTES
This is a recent snapshot of the patient's [facility] medical record.  For current drug dose and complete information and questions, call [phone] or In Basket pool, fv home infusion ([id number])  CSN Number:  [id number]

## 2020-06-02 NOTE — PROGRESS NOTES
This is a recent snapshot of the patient's [facility] medical record.  For current drug dose and complete information and questions, call [phone] or In Basket pool, fv home infusion ([id number])  CSN Number:  [id number]

## 2020-06-03 ENCOUNTER — HOME INFUSION (PRE-WILLOW HOME INFUSION) (OUTPATIENT)
Dept: PHARMACY | Facility: CLINIC | Age: 35
End: 2020-06-03

## 2020-06-03 ENCOUNTER — MEDICAL CORRESPONDENCE (OUTPATIENT)
Dept: HEALTH INFORMATION MANAGEMENT | Facility: CLINIC | Age: 35
End: 2020-06-03

## 2020-06-03 LAB
AST SERPL W P-5'-P-CCNC: 59 U/L (ref 0–45)
BASOPHILS # BLD AUTO: 0.1 10E9/L (ref 0–0.2)
BASOPHILS NFR BLD AUTO: 1.1 %
BUN SERPL-MCNC: 20 MG/DL (ref 7–30)
CREAT SERPL-MCNC: 1.12 MG/DL (ref 0.66–1.25)
CRP SERPL-MCNC: <2.9 MG/L (ref 0–8)
DIFFERENTIAL METHOD BLD: NORMAL
EOSINOPHIL # BLD AUTO: 0.3 10E9/L (ref 0–0.7)
EOSINOPHIL NFR BLD AUTO: 5.7 %
ERYTHROCYTE [DISTWIDTH] IN BLOOD BY AUTOMATED COUNT: 12.7 % (ref 10–15)
ERYTHROCYTE [SEDIMENTATION RATE] IN BLOOD BY WESTERGREN METHOD: 5 MM/H (ref 0–15)
GFR SERPL CREATININE-BSD FRML MDRD: 84 ML/MIN/{1.73_M2}
HCT VFR BLD AUTO: 42.2 % (ref 40–53)
HGB BLD-MCNC: 14.1 G/DL (ref 13.3–17.7)
IMM GRANULOCYTES # BLD: 0 10E9/L (ref 0–0.4)
IMM GRANULOCYTES NFR BLD: 0 %
LYMPHOCYTES # BLD AUTO: 1.9 10E9/L (ref 0.8–5.3)
LYMPHOCYTES NFR BLD AUTO: 42.2 %
MCH RBC QN AUTO: 29.7 PG (ref 26.5–33)
MCHC RBC AUTO-ENTMCNC: 33.4 G/DL (ref 31.5–36.5)
MCV RBC AUTO: 89 FL (ref 78–100)
MONOCYTES # BLD AUTO: 0.4 10E9/L (ref 0–1.3)
MONOCYTES NFR BLD AUTO: 8.2 %
NEUTROPHILS # BLD AUTO: 1.9 10E9/L (ref 1.6–8.3)
NEUTROPHILS NFR BLD AUTO: 42.8 %
NRBC # BLD AUTO: 0 10*3/UL
NRBC BLD AUTO-RTO: 0 /100
PLATELET # BLD AUTO: 205 10E9/L (ref 150–450)
RBC # BLD AUTO: 4.74 10E12/L (ref 4.4–5.9)
WBC # BLD AUTO: 4.4 10E9/L (ref 4–11)

## 2020-06-03 PROCEDURE — 85652 RBC SED RATE AUTOMATED: CPT | Performed by: INTERNAL MEDICINE

## 2020-06-03 PROCEDURE — 82565 ASSAY OF CREATININE: CPT | Performed by: INTERNAL MEDICINE

## 2020-06-03 PROCEDURE — 84450 TRANSFERASE (AST) (SGOT): CPT | Performed by: INTERNAL MEDICINE

## 2020-06-03 PROCEDURE — 86140 C-REACTIVE PROTEIN: CPT | Performed by: INTERNAL MEDICINE

## 2020-06-03 PROCEDURE — 84520 ASSAY OF UREA NITROGEN: CPT | Performed by: INTERNAL MEDICINE

## 2020-06-03 PROCEDURE — 85025 COMPLETE CBC W/AUTO DIFF WBC: CPT | Performed by: INTERNAL MEDICINE

## 2020-06-04 NOTE — DISCHARGE SUMMARY
Admit Date:     2020   Discharge Date:     2020      ADMITTING DIAGNOSIS:  Right long finger infected flexor sheath.      He was admitted on 2020 with the infected flexor sheath.  He had an I and D, found to have winnie pus in the flexor sheath after 24 hours following a puncture wound with a boxthorn to that right long finger, so he was admitted.  He had irrigation and debridement and he was admitted for IV antibiotics.  He was seen by Dr. Plascencia.  He was discharged to home on Ancef 2 grams IV q.8h.         HOMERO CARRERA MD             D: 2020   T: 2020   MT: ESTEVAN      Name:     PREETI MINER   MRN:      [id number]        Account:        [id number]   :      1985           Admit Date:     2020                                  Discharge Date: 2020      Document: [id number]

## 2020-06-04 NOTE — PROGRESS NOTES
This is a recent snapshot of the patient's [facility] medical record.  For current drug dose and complete information and questions, call [phone] or In Basket pool, fv home infusion ([id number])  CSN Number:  [id number]

## 2020-06-06 ENCOUNTER — HOME INFUSION (PRE-WILLOW HOME INFUSION) (OUTPATIENT)
Dept: PHARMACY | Facility: CLINIC | Age: 35
End: 2020-06-06

## 2020-06-08 NOTE — PROGRESS NOTES
This is a recent snapshot of the patient's [facility] medical record.  For current drug dose and complete information and questions, call [phone] or In [facility] pool, fv home infusion ([id number])  CSN Number:  [id number]

## 2020-06-11 ENCOUNTER — VIRTUAL VISIT (OUTPATIENT)
Dept: OCCUPATIONAL THERAPY | Facility: CLINIC | Age: 35
End: 2020-06-11
Payer: COMMERCIAL

## 2020-06-11 DIAGNOSIS — S60.459S: ICD-10-CM

## 2020-06-11 DIAGNOSIS — M25.641 STIFFNESS OF FINGER JOINT, RIGHT: Primary | ICD-10-CM

## 2020-06-11 DIAGNOSIS — L08.9: ICD-10-CM

## 2020-06-11 PROCEDURE — 97110 THERAPEUTIC EXERCISES: CPT | Mod: GT | Performed by: OCCUPATIONAL THERAPIST

## 2020-06-11 PROCEDURE — 97165 OT EVAL LOW COMPLEX 30 MIN: CPT | Mod: GT | Performed by: OCCUPATIONAL THERAPIST

## 2020-06-11 PROCEDURE — 97535 SELF CARE MNGMENT TRAINING: CPT | Mod: GT | Performed by: OCCUPATIONAL THERAPIST

## 2020-06-11 NOTE — PROGRESS NOTES
"Occupational/Hand Therapy Virtual Initial Visit      The patient has been notified of following:     \"This virtual visit will be conducted between you and your provider. We have found that certain health care needs can be provided without the need for physical presence.  This service lets us provide the care you need with a virtual visit.\"    Due to external, as well as internal [facility] management of the COVID-19 Virus, Emir Angela was not seen in our clinic.  As a substitution, we implemented a virtual visit to manage this patient's condition utilizing the [employer] virtual visit platform via the patient s existing code.  The provider, Angela Koch, reviewed the patient's chart, PTRx prescription, and spoke with the patient to determine the following telemedicine visit is appropriate and effective for the patient's care.    The following type of visit was completed:   Video Visit:  The [employer] platform uses a synchronous HIPAA compliant video stream for this patient encounter.       Current Date:  6/11/2020    Subjective:  Emir Angela is a 35 year old right hand dominant male.    Diagnosis:   Right long finger puncture wound with flexor sheath infection  DOI:  5/19/2020  DOS:  5/20/2020  Procedure:  I & D  Post:  3w 1d    Patient reports symptoms of pain, stiffness/loss of motion, weakness/loss of strength and edema of the right long finger which occurred due to puncture wound from buckthorn and developed flexor sheath infection. Since onset symptoms are gradually getting better.  Special tests:  x-ray and US.  Previous treatment: I & D and IV antibiotics.  General health as reported by patient is excellent.  Pertinent medical history includes:None  Medical allergies:none.  Surgical history: orthopedic: see above.  Medication history: None.    Occupational Profile Information:  Current occupation is ATC  Currently not working due to COVID 19  Job Tasks: Computer Work, Prolonged Sitting, Prolonged " Standing  Prior functional level:  independent-shared household chores  Barriers include:none  Mobility: No difficulty  Transportation: drives  Leisure activities/hobbies: disc golf    Functional Outcome Measure:  Upper Extremity Functional Index  SCORE:   Column Totals: 78/80  (A lower score indicates greater disability.)       Objective:  Pain Level (Scale 0-10)   6/11/2020   At Rest 0   With Use 0-1     Pain Description  Date 6/11/2020   Location long finger   Pain Quality Tender and stiff   Frequency intermittent     Pain is worst  daytime   Exacerbated by  over stretch, bump scar   Relieved by rest   Progression Gradually getting better     Edema  Mild of long finger, improved since surgery per patient report    Scar  Mild to moderately adherent per patient report, scar at radial aspect of long finger PIP/P1 and at DPC    Sensation  WNL throughout all nerve distributions; per patient report    ROM  Long Finger 6/11/2020   AROM (PROM) Right   MCP 0/80   PIP 0/95   DIP 0/85     Strength  Unable to assess during virtual visit, pt reports strength is limited when opening jars or attempting to snap fingers     PTRx Content from today's visit:  Exercise Name: Virtual Visit Tips for Patients  Exercise Name: Warmth - Sessions: 1-2x/day, Notes: 5-10 mins for stiffness  Exercise Name: Scar Massage - 3 vector - Reps: 3-5 mins - Sessions: 2, Notes: also circular massage with lotion after the 3 vector massage  Exercise Name: Finger Active Range of Motion Tendon Glides Fist Series - Reps: 5-10 reps - Sessions: 2-3x/day  Exercise Name: Finger Active Range of Motion FDS Flexor Tendon Gliding - Reps: 5-10 reps - Sessions: 2-3x/day, Notes: just for middle finger  Exercise Name: Finger Active Range of Motion PIP Joint Blocking - Reps: 5-10 - Sessions: 2-3x/day, Notes: hold 5 seconds  Exercise Name: Finger Active Range of Motion DIP Joint Blocking - Reps: 5-10 - Sessions: 2-3x/day, Notes: hold 5 seconds  Exercise Name: Finger  Passive Range of Motion Tracking for Finger Extension on Table With Assist (#2) - Reps: 5-10 - Sessions: 2-3x/day  Exercise Name: Finger Passive Range of Motion Hook/IP Joint Flexion - Reps: 5 - Sessions: 2-3x/day, Notes: hold 10-20 seconds  Exercise Name: Hand Strengthening Gripping - Reps: 20-30 - Sessions: 1-2, Notes: use stress ball or sponge  Exercise Name: Hand Strengthening 3 Point Pinch - Reps: 15-20 - Sessions: 1-2x/day, Notes: use stress ball, rolled wash cloth, sponge or holly/putty    Assessment:  Patient presents with symptoms consistent with diagnosis of puncture wound with flexor sheath infection, with surgical  intervention.     Patient's limitations or Problem List includes:  Pain, Decreased ROM/motion, Increased edema, Weakness, Adherent scarring, Decreased  and Decreased pinch of the right long finger which interferes with the patient's ability to perform Recreational Activities and Household Chores as compared to previous level of function.    Rehab Potential:  Excellent - Return to full activity, no limitations    Patient will benefit from skilled Occupational Therapy to increase ROM, flexibility, overall strength,  strength and pinch strength and decrease pain, edema and adherence of scarring to return to previous activity level and resume normal daily tasks and to reach their rehab potential.    Barriers to Learning:  No barrier    Communication Issues:  Patient appears to be able to clearly communicate and understand verbal and written communication and follow directions correctly.    Chart Review: Chart Review and Simple history review with patient    Identified Performance Deficits: home establishment and management and leisure activities    Assessment of Occupational Performance:  1-3 Performance Deficits    Clinical Decision Making (Complexity): Low complexity    Treatment Explanation:  The following has been discussed with the patient:  RX ordered/plan of care  Anticipated  outcomes  Possible risks and side effects    Plan:  Frequency:  2 X a month, once daily  Duration:  for 2 months  Treatment Plan:    Modalities:    US, Fluidotherapy and Paraffin   Therapeutic Exercise:    AROM, PROM, Tendon Gliding, Blocking, Extensor Tracking and Isotonics  Neuromuscular re-ed:   Desensitization and Kinesiotaping  Manual Techniques:   Scar mobilization  Self Care:    Self Care Tasks    Discharge Plan:  Achieve all LTG.  Independent in home treatment program.  Reach maximal therapeutic benefit.    Home Exercise Program:  Warmth for stiffness  Scar massage, circular and 3 vector  AROM finger E/F with 3 fist tendon gliding and FDS  PIP and DIP blocking exercises  Tracking on table for finger extension  PROM IP flexion   and 3 point pinch strengthening  Consider scar away OTC scar pads sleeping for scar softening    Next Visit:  Discharge from Hand Therapy; continue home program.  Hold chart open for one month, if no contact is received from patient, discharge will occur at that time with this note serving as the discharge summary.    Virtual visit contact time    Time of service began: 2:20 PM  Time of service ended: 3:00 PM  Total Time for set up, visit, and documentation: 45 minutes    Payor: PREF[employer] / Plan: P[employer] HMO / Product Type: PPO /     Procedure Code/s   Therapeutic Exercise (61871): 15 minutes  Self Care / Home Management Training (69012): 10 minutes    I have reviewed the note as documented above.  This accurately captures the substance of my conversation with the patient.  Provider location: [place] ([facility]/State)  Patient location: [place]    ___________________________________________________

## 2020-06-22 ENCOUNTER — OFFICE VISIT (OUTPATIENT)
Dept: INTERNAL MEDICINE | Facility: CLINIC | Age: 35
End: 2020-06-22
Payer: COMMERCIAL

## 2020-06-22 ENCOUNTER — NURSE TRIAGE (OUTPATIENT)
Dept: NURSING | Facility: CLINIC | Age: 35
End: 2020-06-22

## 2020-06-22 VITALS
BODY MASS INDEX: 20.86 KG/M2 | WEIGHT: 149 LBS | TEMPERATURE: 98 F | SYSTOLIC BLOOD PRESSURE: 120 MMHG | HEART RATE: 82 BPM | HEIGHT: 71 IN | OXYGEN SATURATION: 98 % | DIASTOLIC BLOOD PRESSURE: 70 MMHG | RESPIRATION RATE: 12 BRPM

## 2020-06-22 DIAGNOSIS — R79.89 ELEVATED LFTS: ICD-10-CM

## 2020-06-22 DIAGNOSIS — K21.9 GASTROESOPHAGEAL REFLUX DISEASE, ESOPHAGITIS PRESENCE NOT SPECIFIED: Primary | ICD-10-CM

## 2020-06-22 DIAGNOSIS — R10.13 EPIGASTRIC PAIN: ICD-10-CM

## 2020-06-22 LAB
BASOPHILS # BLD AUTO: 0 10E9/L (ref 0–0.2)
BASOPHILS NFR BLD AUTO: 0.4 %
DIFFERENTIAL METHOD BLD: NORMAL
EOSINOPHIL # BLD AUTO: 0.1 10E9/L (ref 0–0.7)
EOSINOPHIL NFR BLD AUTO: 1.5 %
ERYTHROCYTE [DISTWIDTH] IN BLOOD BY AUTOMATED COUNT: 13.2 % (ref 10–15)
HCT VFR BLD AUTO: 43.2 % (ref 40–53)
HGB BLD-MCNC: 14 G/DL (ref 13.3–17.7)
LIPASE SERPL-CCNC: 111 U/L (ref 73–393)
LYMPHOCYTES # BLD AUTO: 5.2 10E9/L (ref 0.8–5.3)
LYMPHOCYTES NFR BLD AUTO: 68.8 %
MCH RBC QN AUTO: 29 PG (ref 26.5–33)
MCHC RBC AUTO-ENTMCNC: 32.4 G/DL (ref 31.5–36.5)
MCV RBC AUTO: 90 FL (ref 78–100)
MONOCYTES # BLD AUTO: 0.7 10E9/L (ref 0–1.3)
MONOCYTES NFR BLD AUTO: 8.6 %
NEUTROPHILS # BLD AUTO: 1.6 10E9/L (ref 1.6–8.3)
NEUTROPHILS NFR BLD AUTO: 20.7 %
PLATELET # BLD AUTO: 247 10E9/L (ref 150–450)
RBC # BLD AUTO: 4.82 10E12/L (ref 4.4–5.9)
WBC # BLD AUTO: 7.5 10E9/L (ref 4–11)

## 2020-06-22 PROCEDURE — 80053 COMPREHEN METABOLIC PANEL: CPT | Performed by: INTERNAL MEDICINE

## 2020-06-22 PROCEDURE — 36415 COLL VENOUS BLD VENIPUNCTURE: CPT | Performed by: INTERNAL MEDICINE

## 2020-06-22 PROCEDURE — 99213 OFFICE O/P EST LOW 20 MIN: CPT | Performed by: INTERNAL MEDICINE

## 2020-06-22 PROCEDURE — 83690 ASSAY OF LIPASE: CPT | Performed by: INTERNAL MEDICINE

## 2020-06-22 PROCEDURE — 85025 COMPLETE CBC W/AUTO DIFF WBC: CPT | Performed by: INTERNAL MEDICINE

## 2020-06-22 ASSESSMENT — ENCOUNTER SYMPTOMS
HEARTBURN: 0
HEMATOCHEZIA: 0
COUGH: 0
CHILLS: 0
TROUBLE SWALLOWING: 0
VOMITING: 0
VOICE CHANGE: 0
FEVER: 0
DIARRHEA: 0
NAUSEA: 0
ABDOMINAL PAIN: 1

## 2020-06-22 ASSESSMENT — MIFFLIN-ST. JEOR: SCORE: 1620.05

## 2020-06-22 NOTE — PATIENT INSTRUCTIONS
Patient Education     Epigastric Pain (Uncertain Cause)  Epigastric pain is pain in the upper abdomen. It can be a sign of disease. Common causes include:    Acid reflux (stomach acid flowing up into the esophagus)    Gastritis (irritation of the stomach lining) Most often this is from aspirin or NSAID medicines such as ibuprofen, bacteria called H. pylori, or frequent alcohol use.    Peptic ulcer disease    Inflammation of the pancreas    Gallstone    Infection in the gallbladder  Pain may be dull or burning. It may spread upward to the chest or to the back. There may be other symptoms such as belching, bloating, cramps or hunger pains. There may be weight loss or poor appetite, nausea or vomiting.  Since the cause of your pain is not certain yet,you may need more tests. Sometimes the doctor will treat you for the most likely condition to see if there is improvement before doing more tests.  Home care  Medicines    Antacids help neutralize the normal acids in your stomach.If you don t like the liquid, you can try a chewable one. You may find one works better than another for you. Overuse can cause diarrhea or constipation.    Acid blockers (H2 blockers) decrease acid production. Examples are cimetidine, famotidine, and ranitidine.    Acid inhibitors (PPIs) decrease acid production in a different way than the blockers. You may find they work better, but can take a little longer to take effect.  Examples are omeprazole, lansoprazole, pantoprazole, rabeprazole, and esomeprazole. Many of these are available over-the-counter or available as generics.    Take an antacid 30 to 60 minutes after eating and at bedtime, but not at the same time as an acid blocker.    Try not to take NSAIDs such as ibuprofen. Aspirin may also cause problems, but if taking it for your heart or other medical reasons, talk to your doctor before stopping it; you don't want to cause a worse problem, like a heart attack or stroke.  Diet    If  certain foods seem to cause your pain, try not to eat them. Certain foods can worsen symptoms of gastritis. Limit or avoid fatty, fried, and spicy foods, as well as coffee, chocolate, mint, and foods with high acid content such as tomatoes and citrus fruit and juices (orange, grapefruit, lemon).    Eat slowly and chew food well before swallowing. Symptoms of gastritis can be worsened by certain foods.    Don't drink alcohol. It can irritate the stomach.    Don't consume caffeine, or use tobacco. These can delay healing and worsen your problem.    Try eating smaller meals with snacks in between.    Keep an empty stomach for 2 to 3 hours before lying down.    Prop the head of the bed up if you have overnight symptoms. This helps acid clear from your esophagus.    Follow-up care  Follow up with your healthcare provider or as advised.  When to seek medical advice  Call your healthcare provider right away if any of the following occur:    Stomach pain worsens or moves to the right lower part of the abdomen    Chest pain appears, or if it worsens or spreads to the chest, back, neck, shoulder, or arm    Frequent vomiting (can t keep down liquids)    Blood in the stool or vomit (red or black color)    Feeling weak or dizzy, fainting, or having trouble breathing    Fever of 100.4 F (38 C) or higher, or as directed by your healthcare provider    Abdominal swelling  Date Last Reviewed: 3/1/2018    6964-5409 [employer]. [address] [place] [address], [place] [address]. All rights reserved. This information is not intended as a substitute for professional medical care. Always follow your healthcare professional's instructions.

## 2020-06-22 NOTE — NURSING NOTE
"/70   Pulse 82   Temp 98  F (36.7  C) (Oral)   Resp 12   Ht 1.791 m (5' 10.5\")   Wt 67.6 kg (149 lb)   SpO2 98%   BMI 21.08 kg/m      "

## 2020-06-22 NOTE — TELEPHONE ENCOUNTER
"Call from pt       CC:  Upper ABD pain x 3-5 days       Below the sternum in an area about 5\" across   Not constant       \"woken up in the am now\" from it       \"waxes and wanes\" - \"dull\" ache     Pain can be a 7-8 / 10 at times but spells may only last between 2-15min      No nausea   No diarrhea    No vomiting       Does feel different that usual heart burn        A/P:   > OK for clinic visit with belly pain    > Care advice as noted otherwise - gentle diet for now until seen       Booker Martinez RN           COVID 19 Nurse Triage Plan/Patient Instructions    Please be aware that novel coronavirus (COVID-19) may be circulating in the community. If you develop symptoms such as fever, cough, or SOB or if you have concerns about the presence of another infection including coronavirus (COVID-19), please contact your health care provider or visit [email or web address].     Disposition/Instructions    Patient to schedule an In Person Visit with provider. Reference Visit Selection Guide.    Thank you for taking steps to prevent the spread of this virus.  o Limit your contact with others.  o Wear a simple mask to cover your cough.  o Wash your hands well and often.    Resources    [facility]: About COVID-19: [email or web address]    CDC: What to Do If You're Sick: www.cdc.gov/coronavirus/2019-ncov/about/steps-when-sick.html    CDC: Ending Home Isolation: www.cdc.gov/coronavirus/2019-ncov/hcp/disposition-in-home-patients.html     CDC: Caring for Someone: www.cdc.gov/coronavirus/2019-ncov/if-you-are-sick/care-for-someone.html     [facility]: Interim Guidance for Hospital Discharge to Home: www.health.[facility].mn.us/diseases/coronavirus/hcp/hospdischarge.pdf    [facility] clinical trials (COVID-19 research studies): clinicalaffairs.[facility]/umn-clinical-trials     Below are the COVID-19 hotlines at the [place] Department of Health ([facility]). Interpreters are available.   o For health questions: Call " 934.315.8045 or 1-668.146.2249 (7 a.m. to 7 p.m.)  o For questions about schools and childcare: Call 875-932-4752 or 1-721.164.9979 (7 a.m. to 7 p.m.)                     Additional Information    Negative: Passed out (i.e., fainted, collapsed and was not responding)    Negative: Shock suspected (e.g., cold/pale/clammy skin, too weak to stand, low BP, rapid pulse)    Negative: Visible sweat on face or sweat is dripping down    Negative: Chest pain    Negative: SEVERE abdominal pain (e.g., excruciating)    Negative: Pain lasting > 10 minutes and over 50 years old    Negative: Pain lasting > 10 minutes and over 40 years old and associated chest, arm, neck, upper back, or jaw pain    Negative: Pain lasting > 10 minutes and over 35 years old and at least one cardiac risk factor    Negative: Pain lasting > 10 minutes and history of heart disease (i.e., heart attack, bypass surgery, angina, angioplasty, CHF)    Negative: Recent injury to the abdomen    Negative: Vomiting red blood or black (coffee ground) material    Negative: Bloody, black, or tarry bowel movements    Negative: Pregnant > 24 weeks and hand or face swelling    Negative: Vomiting bile (green color)    Negative: Constant abdominal pain lasting > 2 hours    Negative: Patient sounds very sick or weak to the triager    Negative: Vomiting and abdomen looks much more swollen than usual    Negative: White of the eyes have turned yellow (i.e.,  jaundice)    Negative: Fever > 103 F (39.4 C)    Negative: Fever > 101 F (38.3 C) and over 60 years of age    Negative: Fever > 100.0 F (37.8 C) and has diabetes mellitus or a weak immune system (e.g., HIV positive, cancer chemotherapy, organ transplant, splenectomy, chronic steroids)    Negative: Fever > 100.0 F (37.8 C) and bedridden (e.g., nursing home patient, stroke, chronic illness, recovering from surgery)    Negative: Age > 60 years    Negative: Patient wants to be seen    Mild pain that comes and goes (cramps)  lasts > 24 hours    Protocols used: ABDOMINAL PAIN - UPPER-A-OH

## 2020-06-22 NOTE — PROGRESS NOTES
Subjective     Emir Angela is a 35 year old male who presents to clinic today for the following health issues:    HPI   Patient complains of 5-day history of epigastric discomfort.  Had history of acid reflux in the past but he feels different kind of pain this time.  It comes and goes.  Yesterday he had multiple episodes of pain which was a dull aching pain went up to 8/10 in severity.  Each episode lasts from a few minutes to 15 minutes.  Denies associated nausea or vomiting.  Denies diarrhea or constipation.  Denies fever, chills.  Denies blood in the stool.  Denies any change in eating habits.  He had some berries before the incident started.    Patient Active Problem List   Diagnosis     Cervicalgia     Nonallopathic lesion of thoracic region     Chronic nonallergic rhinitis     GERD (gastroesophageal reflux disease)     CARDIOVASCULAR SCREENING; LDL GOAL LESS THAN 160     Sacral pain     Urinary frequency     Gastroesophageal reflux disease without esophagitis     Hip pain, bilateral     Infection of flexor tendon sheath     Stiffness of finger joint, right     Finger, superficial foreign body (splinter), without major open wound, infected, sequela     Past Surgical History:   Procedure Laterality Date     APPENDECTOMY  2004     ESOPHAGOSCOPY, GASTROSCOPY, DUODENOSCOPY (EGD), COMBINED N/A 11/9/2018    Procedure: COMBINED ESOPHAGOSCOPY, GASTROSCOPY, DUODENOSCOPY (EGD), BIOPSY SINGLE OR MULTIPLE;  Surgeon: Remy Vigil MD;  Location: [facility]     IRRIGATION AND DEBRIDEMENT HAND, COMBINED Right 5/20/2020    Procedure: Irrigation and debridement of the right long finger and its flexor sheath;  Surgeon: Jeannine Escobar MD;  Location: [facility]     SINUS SURGERY  4/27/10       Social History     Tobacco Use     Smoking status: Never Smoker     Smokeless tobacco: Never Used   Substance Use Topics     Alcohol use: Yes     Alcohol/week: 0.0 standard drinks     Comment: once a wk      Family History  "  Problem Relation Age of Onset     Hypertension Father      Cardiovascular Maternal Grandfather      Heart Disease Maternal Grandfather      Allergies Brother          Current Outpatient Medications   Medication Sig Dispense Refill     omeprazole (PRILOSEC) 20 MG DR capsule Take 1 capsule (20 mg) by mouth daily 30 capsule 0     Allergies   Allergen Reactions     Amoxicillin      Sulfa Drugs Hives       Reviewed and updated as needed this visit by Provider       Review of Systems   Constitutional: Negative for chills and fever.   HENT: Negative for trouble swallowing and voice change.    Respiratory: Negative for cough.    Gastrointestinal: Positive for abdominal pain. Negative for diarrhea, heartburn, hematochezia, nausea and vomiting.   Musculoskeletal: Neck pain: cbc.          Objective    /70   Pulse 82   Temp 98  F (36.7  C) (Oral)   Resp 12   Ht 1.791 m (5' 10.5\")   Wt 67.6 kg (149 lb)   SpO2 98%   BMI 21.08 kg/m    Body mass index is 21.08 kg/m .  Physical Exam  Vitals signs reviewed.   Constitutional:       Appearance: Normal appearance.   Cardiovascular:      Rate and Rhythm: Normal rate and regular rhythm.   Pulmonary:      Effort: Pulmonary effort is normal.      Breath sounds: Normal breath sounds.   Abdominal:      General: Abdomen is flat. Bowel sounds are normal. There is no distension.      Palpations: Abdomen is soft.      Tenderness: There is no abdominal tenderness.   Skin:     General: Skin is warm.   Neurological:      General: No focal deficit present.      Mental Status: He is alert.   Psychiatric:         Mood and Affect: Mood normal.            Assessment & Plan     Emir was seen today for gastrointestinal problem.    Diagnoses and all orders for this visit:    Gastroesophageal reflux disease, esophagitis presence not specified  -     CBC with platelets and differential  -     Comprehensive metabolic panel (BMP + Alb, Alk Phos, ALT, AST, Total. Bili, TP)  -     " Lipase    Epigastric pain  -     omeprazole (PRILOSEC) 20 MG DR capsule; Take 1 capsule (20 mg) by mouth daily    Differential includes gastritis versus gallbladder pathology.  Will do blood work and give a trial of Prilosec.  Advised to call if symptoms are worsening.    Return if symptoms worsen or fail to improve.    Tia Best MD  [facility]

## 2020-06-23 LAB
ALBUMIN SERPL-MCNC: 3.6 G/DL (ref 3.4–5)
ALP SERPL-CCNC: 65 U/L (ref 40–150)
ALT SERPL W P-5'-P-CCNC: 237 U/L (ref 0–70)
ANION GAP SERPL CALCULATED.3IONS-SCNC: 6 MMOL/L (ref 3–14)
AST SERPL W P-5'-P-CCNC: 131 U/L (ref 0–45)
BILIRUB SERPL-MCNC: 0.5 MG/DL (ref 0.2–1.3)
BUN SERPL-MCNC: 18 MG/DL (ref 7–30)
CALCIUM SERPL-MCNC: 8.6 MG/DL (ref 8.5–10.1)
CHLORIDE SERPL-SCNC: 104 MMOL/L (ref 94–109)
CO2 SERPL-SCNC: 28 MMOL/L (ref 20–32)
CREAT SERPL-MCNC: 1.14 MG/DL (ref 0.66–1.25)
GFR SERPL CREATININE-BSD FRML MDRD: 82 ML/MIN/{1.73_M2}
GLUCOSE SERPL-MCNC: 102 MG/DL (ref 70–99)
POTASSIUM SERPL-SCNC: 4 MMOL/L (ref 3.4–5.3)
PROT SERPL-MCNC: 7.3 G/DL (ref 6.8–8.8)
SODIUM SERPL-SCNC: 138 MMOL/L (ref 133–144)

## 2020-06-24 ENCOUNTER — TELEPHONE (OUTPATIENT)
Dept: INTERNAL MEDICINE | Facility: CLINIC | Age: 35
End: 2020-06-24

## 2020-06-24 DIAGNOSIS — R51.9 ACUTE INTRACTABLE HEADACHE, UNSPECIFIED HEADACHE TYPE: ICD-10-CM

## 2020-06-24 DIAGNOSIS — K82.8 GALLBLADDER SLUDGE: ICD-10-CM

## 2020-06-24 DIAGNOSIS — R79.89 ELEVATED LFTS: ICD-10-CM

## 2020-06-24 DIAGNOSIS — R53.82 CHRONIC FATIGUE: ICD-10-CM

## 2020-06-24 DIAGNOSIS — R10.13 EPIGASTRIC PAIN: Primary | ICD-10-CM

## 2020-06-24 NOTE — TELEPHONE ENCOUNTER
Call to pt and reviewed the plan of care. He scheduled the US. He has not drank a lot of alcohol. Just a beer or two. Doesn't take herbal supplements. Only took tylenol for his finger infection.     He will get the US and take the Omeprazole and wait for results and see if the Omeprazole helps.

## 2020-06-25 ENCOUNTER — AMBULATORY - HEALTHEAST (OUTPATIENT)
Dept: FAMILY MEDICINE | Facility: CLINIC | Age: 35
End: 2020-06-25

## 2020-06-25 ENCOUNTER — HOSPITAL ENCOUNTER (OUTPATIENT)
Dept: ULTRASOUND IMAGING | Facility: CLINIC | Age: 35
Discharge: HOME | End: 2020-06-25
Attending: INTERNAL MEDICINE | Admitting: INTERNAL MEDICINE
Payer: COMMERCIAL

## 2020-06-25 ENCOUNTER — VIRTUAL VISIT (OUTPATIENT)
Dept: FAMILY MEDICINE | Facility: OTHER | Age: 35
End: 2020-06-25
Payer: COMMERCIAL

## 2020-06-25 DIAGNOSIS — R79.89 ELEVATED LFTS: Primary | ICD-10-CM

## 2020-06-25 DIAGNOSIS — Z20.822 SUSPECTED COVID-19 VIRUS INFECTION: ICD-10-CM

## 2020-06-25 DIAGNOSIS — R79.89 ELEVATED LFTS: ICD-10-CM

## 2020-06-25 DIAGNOSIS — R10.13 EPIGASTRIC PAIN: ICD-10-CM

## 2020-06-25 PROCEDURE — 99421 OL DIG E/M SVC 5-10 MIN: CPT | Performed by: PHYSICIAN ASSISTANT

## 2020-06-25 PROCEDURE — 76705 ECHO EXAM OF ABDOMEN: CPT

## 2020-06-25 NOTE — TELEPHONE ENCOUNTER
Patient is calling back to discuss this a little more. He wants to talk about his fatigue and possible any tick born diseases. He says if he is going to do more blood tests they may want to add more.

## 2020-06-25 NOTE — PROGRESS NOTES
"Date: 2020 16:36:42  Clinician: Anshu Munoz  Clinician NPI: [id number]  Patient: Fadi Angela  Patient : 1985  Patient Address: [address] [place] [address]  Patient Phone: [phone]  Visit Protocol: URI  Patient Summary:  Fadi is a 35 year old ( : 1985 ) male who initiated a Visit for COVID-19 (Coronavirus) evaluation and screening. When asked the question \"Please sign me up to receive news, health information and promotions from [employer].\", Fadi responded \"No\".    Fadi states his symptoms started suddenly 5-6 days ago.   His symptoms consist of nausea, malaise, a headache, and myalgia.   Symptom details   Headache: He states the headache is moderate (4-6 on a 10 point pain scale).    Fadi denies having wheezing, teeth pain, ageusia, diarrhea, vomiting, rhinitis, ear pain, chills, sore throat, enlarged lymph nodes, anosmia, facial pain or pressure, fever, cough, and nasal congestion. He also denies having recent facial or sinus surgery in the past 60 days and double sickening (worsening symptoms after initial improvement). He is not experiencing dyspnea.   Precipitating events  He has not recently been exposed to someone with influenza. Fadi has been in close contact with the following high risk individuals: children under the age of 5.   Pertinent COVID-19 (Coronavirus) information  In the past 14 days, Fadi has not worked in a congregate living setting.   He does not work or volunteer as healthcare worker or a  and does not work or volunteer in a healthcare facility.   Fadi also has not lived in a congregate living setting in the past 14 days. He does not live with a healthcare worker.   Fadi has not had a close contact with a laboratory-confirmed COVID-19 patient within 14 days of symptom onset.   Pertinent medical history  Fadi has taken an antibiotic medication in the past month. Antibiotic details as reported by the patient (free text): ceflazolin: 2 weeks of " IV antibiotics 3x/day after a finger infection and subsequent surgery to clear out tendon sheeth. 2 day stay in hospital then [facility] after that at home. Finished takeing June 5. Surgery was June 20   Fadi does not need a return to work/school note.   Weight: 155 lbs   Fadi does not smoke or use smokeless tobacco.   Additional information as reported by the patient (free text): I've had stomach pain as well as new headaches for the last 5-7 days. Other symptoms include: Headache, lack of appetite, feeling fatigue. I recently was blood tested which showed my numbers for liver were elevated. ALT=237 U/L and YGI=464Q/L   Weight: 155 lbs    MEDICATIONS: omeprazole oral, ALLERGIES: Sulfa (Sulfonamide Antibiotics), amoxicillin  Clinician Response:  Dear Fadi,   Your symptoms show that you may have coronavirus (COVID-19). This illness can cause fever, cough and trouble breathing. Many people get a mild case and get better on their own. Some people can get very sick.  What should I do?  We would like to test you for this virus.   1. Please call [phone] to schedule your visit. Explain that you were referred by [facility] to have a COVID-19 test. Be ready to [facility].  The following will serve as your written order for this COVID Test, ordered by me, for the indication of suspected COVID [Z20.828]: The test will be ordered in [employer], our electronic health record, after you are scheduled. It will show as ordered and authorized by Helder Moncada MD.  Order: COVID-19 (Coronavirus) PCR for SYMPTOMATIC testing from [facility].      2. When it's time for your COVID test:  Stay at least 6 feet away from others. (If someone will drive you to your test, stay in the backseat, as far away from the  as you can.)   Cover your mouth and nose with a mask, tissue or washcloth.  Go straight to the testing site. Don't make any stops on the way there or back.      3.Starting now: Stay home and away from others (self-isolate)  "until:   You've had no fever---and no medicine that reduces fever---for 3 full days (72 hours). And...   Your other symptoms have gotten better. For example, your cough or breathing has improved. And...   At least 10 days have passed since your symptoms started.       During this time, don't leave the house except for testing or medical care.   Stay in your own room, even for meals. Use your own bathroom if you can.   Stay away from others in your home. No hugging, kissing or shaking hands. No visitors.  Don't go to work, school or anywhere else.    Clean \"high touch\" surfaces often (doorknobs, counters, handles, etc.). Use a household cleaning spray or wipes. You'll find a full list of  on the EPA website: www.epa.gov/pesticide-registration/list-n-disinfectants-use-against-sars-cov-2.   Cover your mouth and nose with a mask, tissue or washcloth to avoid spreading germs.  Wash your hands and face often. Use soap and water.  Caregivers in these groups are at risk for severe illness due to COVID-19:  o People 65 years and older  o People who live in a nursing home or long-term care facility  o People with chronic disease (lung, heart, cancer, diabetes, kidney, liver, immunologic)  o People who have a weakened immune system, including those who:   Are in cancer treatment  Take medicine that weakens the immune system, such as corticosteroids  Had a bone marrow or organ transplant  Have an immune deficiency  Have poorly controlled HIV or AIDS  Are obese (body mass index of 40 or higher)  Smoke regularly   o Caregivers should wear gloves while washing dishes, handling laundry and cleaning bedrooms and bathrooms.  o Use caution when washing and drying laundry: Don't shake dirty laundry, and use the warmest water setting that you can.  o For more tips, go to www.cdc.gov/coronavirus/2019-ncov/downloads/10Things.pdf.      How can I take care of myself?   Get lots of rest. Drink extra fluids (unless a doctor has told " you not to).   Take Tylenol (acetaminophen) for fever or pain. If you have liver or kidney problems, ask your family doctor if it's okay to take Tylenol.   Adults can take either:    650 mg (two 325 mg pills) every 4 to 6 hours, or...   1,000 mg (two 500 mg pills) every 8 hours as needed.    Note: Don't take more than 3,000 mg in one day. Acetaminophen is found in many medicines (both prescribed and over-the-counter medicines). Read all labels to be sure you don't take too much.   For children, check the Tylenol bottle for the right dose. The dose is based on the child's age or weight.    If you have other health problems (like cancer, heart failure, an organ transplant or severe kidney disease): Call your specialty clinic if you don't feel better in the next 2 days.       Know when to call 911. Emergency warning signs include:    Trouble breathing or shortness of breath Pain or pressure in the chest that doesn't go away Feeling confused like you haven't felt before, or not being able to wake up Bluish-colored lips or face.  Where can I get more information?   [facility] -- About COVID-19: [email or web address]   CDC -- What to Do If You're Sick: www.cdc.gov/coronavirus/2019-ncov/about/steps-when-sick.html   CDC -- Ending Home Isolation: www.cdc.gov/coronavirus/2019-ncov/hcp/disposition-in-home-patients.html   CDC -- Caring for Someone: www.cdc.gov/coronavirus/2019-ncov/if-you-are-sick/care-for-someone.html   [facility] -- Interim Guidance for Hospital Discharge to Home: www.health.[facility].mn.us/diseases/coronavirus/hcp/hospdischarge.pdf   [facility] clinical trials (COVID-19 research studies): clinicalaffairs.[facility]/umn-clinical-trials    Below are the COVID-19 hotlines at the [place] Department of Health ([facility]). Interpreters are available.    For health questions: Call 767-991-7933 or 1-546.779.8057 (7 a.m. to 7 p.m.) For questions about schools and childcare: Call 228-956-8306 or  0-036-206-2108 (7 a.m. to 7 p.m.)    Diagnosis: Myalgia  Diagnosis ICD: M79.1

## 2020-06-25 NOTE — TELEPHONE ENCOUNTER
See message below. He is rechecking labs next week.     Was this discussed at OV on 6/22/20?     Written by Tia Best MD on 6/25/2020 12:15 PM   Minimal sludge noted in gallbladder.  If the pain is not getting better we can consider referral to general surgery.   Will repeat LFT next week.  Order placed.

## 2020-06-26 ENCOUNTER — AMBULATORY - HEALTHEAST (OUTPATIENT)
Dept: FAMILY MEDICINE | Facility: CLINIC | Age: 35
End: 2020-06-26

## 2020-06-26 DIAGNOSIS — Z20.822 SUSPECTED COVID-19 VIRUS INFECTION: ICD-10-CM

## 2020-06-26 NOTE — TELEPHONE ENCOUNTER
He doesn't recall a tick bite. He did his COVID testing through [facility], so cannot see the orders.   He will let us know if it is Positive.     He scheduled for Monday lab work.

## 2020-06-26 NOTE — TELEPHONE ENCOUNTER
Call to pt. Pt states he has been in areas of [place] the last several weeks.   He is asking about tick borne diseases testing.    Feels more fatigued. Not sure if it is the stomach pains though.  No appetite.     Headaches Pain level less than 6.   Having headaches the last 5-10 days. Sometimes up to 3 times a day. Can come and go. 20 minutes, to 2 hours. Will lie down if has it.     Please advise for the Testing, Tick diseases. He also would like the referral to surgery now in case they are booking out.     He did oncare. They recommended testing him for COVID.   Clinician Response:  Dear Fadi,   Your symptoms show that you may have coronavirus (COVID-19).  We would like to test you for this virus.   1. Please call [phone] to schedule your visit. Explain that you were referred by [facility] to have a COVID-19 test. Be ready to [facility] number.  The following will serve as your written order for this COVID Test, ordered by me, for the indication of suspected COVID [Z20.828]: The test will be ordered in [employer], our electronic health record, after you are scheduled. It will show as ordered and authorized by Helder Moncada MD.  Order: COVID-19 (Coronavirus) PCR for SYMPTOMATIC testing from [facility].

## 2020-06-26 NOTE — TELEPHONE ENCOUNTER
Minimal sludge noted in gallbladder ultrasound. It was done after the OV. I didn't discuss. Please inform him about the usg gall bladder results    Tia Best MD

## 2020-06-26 NOTE — TELEPHONE ENCOUNTER
Did he have any tick bite? I can order lyme testing. Please advise him to do covid testing also. I can order the test if he wants it    Tia Best MD

## 2020-06-27 ENCOUNTER — COMMUNICATION - HEALTHEAST (OUTPATIENT)
Dept: FAMILY MEDICINE | Facility: CLINIC | Age: 35
End: 2020-06-27

## 2020-06-29 ENCOUNTER — MYC MEDICAL ADVICE (OUTPATIENT)
Dept: INTERNAL MEDICINE | Facility: CLINIC | Age: 35
End: 2020-06-29

## 2020-06-29 DIAGNOSIS — R51.9 ACUTE INTRACTABLE HEADACHE, UNSPECIFIED HEADACHE TYPE: ICD-10-CM

## 2020-06-29 DIAGNOSIS — R79.89 ELEVATED LFTS: ICD-10-CM
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
## 2020-06-29 DIAGNOSIS — R53.82 CHRONIC FATIGUE: Primary | ICD-10-CM

## 2020-06-29 DIAGNOSIS — R53.82 CHRONIC FATIGUE: ICD-10-CM

## 2020-06-29 LAB
ALBUMIN SERPL-MCNC: 3.7 G/DL (ref 3.4–5)
ALP SERPL-CCNC: 65 U/L (ref 40–150)
ALT SERPL W P-5'-P-CCNC: 254 U/L (ref 0–70)
AST SERPL W P-5'-P-CCNC: 157 U/L (ref 0–45)
B BURGDOR IGG+IGM SER QL: 0.17 (ref 0–0.89)
BILIRUB DIRECT SERPL-MCNC: 0.2 MG/DL (ref 0–0.2)
BILIRUB SERPL-MCNC: 0.7 MG/DL (ref 0.2–1.3)
PROT SERPL-MCNC: 7.3 G/DL (ref 6.8–8.8)

## 2020-06-29 PROCEDURE — 82550 ASSAY OF CK (CPK): CPT | Performed by: INTERNAL MEDICINE

## 2020-06-29 PROCEDURE — 80076 HEPATIC FUNCTION PANEL: CPT | Performed by: INTERNAL MEDICINE

## 2020-06-29 PROCEDURE — 86666 EHRLICHIA ANTIBODY: CPT | Mod: 90 | Performed by: INTERNAL MEDICINE

## 2020-06-29 PROCEDURE — 86618 LYME DISEASE ANTIBODY: CPT | Performed by: INTERNAL MEDICINE

## 2020-06-29 PROCEDURE — 36415 COLL VENOUS BLD VENIPUNCTURE: CPT | Performed by: INTERNAL MEDICINE

## 2020-06-29 PROCEDURE — 99000 SPECIMEN HANDLING OFFICE-LAB: CPT | Performed by: INTERNAL MEDICINE

## 2020-06-30 DIAGNOSIS — R79.89 ELEVATED LFTS: ICD-10-CM

## 2020-06-30 DIAGNOSIS — R94.5 ABNORMAL RESULTS OF LIVER FUNCTION STUDIES: Primary | ICD-10-CM

## 2020-06-30 DIAGNOSIS — R94.5 ABNORMAL RESULTS OF LIVER FUNCTION STUDIES: ICD-10-CM

## 2020-06-30 PROCEDURE — 86704 HEP B CORE ANTIBODY TOTAL: CPT | Performed by: INTERNAL MEDICINE

## 2020-06-30 PROCEDURE — 86803 HEPATITIS C AB TEST: CPT | Performed by: INTERNAL MEDICINE

## 2020-06-30 PROCEDURE — 86705 HEP B CORE ANTIBODY IGM: CPT | Performed by: INTERNAL MEDICINE

## 2020-06-30 PROCEDURE — 86706 HEP B SURFACE ANTIBODY: CPT | Performed by: INTERNAL MEDICINE

## 2020-06-30 PROCEDURE — 36415 COLL VENOUS BLD VENIPUNCTURE: CPT | Performed by: INTERNAL MEDICINE

## 2020-06-30 PROCEDURE — 87340 HEPATITIS B SURFACE AG IA: CPT | Performed by: INTERNAL MEDICINE

## 2020-06-30 PROCEDURE — 82550 ASSAY OF CK (CPK): CPT | Performed by: INTERNAL MEDICINE

## 2020-06-30 PROCEDURE — 86708 HEPATITIS A ANTIBODY: CPT | Performed by: INTERNAL MEDICINE

## 2020-06-30 NOTE — TELEPHONE ENCOUNTER
RECORDS RECEIVED FROM: Internal - New per ref-pt, Elevated LFTs   Appt Date: 07.01.2020   NOTES STATUS DETAILS   OFFICE NOTE from referring provider Internal 06.25.2020 Consult Tia Best MD [facility]   OFFICE NOTES from other specialists N/A    DISCHARGE SUMMARY from hospital N/A    MEDICATION LIST Internal    LIVER BIOSPY (IF APPLICABLE)      PATHOLOGY REPORTS  N/A    IMAGING     ENDOSCOPY (IF AVAILABLE) Internal 11.09.2018     N/A    ULTRASOUND LIVER Internal 06.25.2020 LIMITED ABDOMINAL ULTRASOUND     CT OF ABDOMEN N/A    MRI OF LIVER N/A    FIBROSCAN, US ELASTOGRAPHY, FIBROSIS SCAN, MR ELASTOGRAPHY N/A    LABS     HEPATIC PANEL (LIVER PANEL) Internal 06.29.2020   BASIC METABOLIC PANEL Internal 05.20.2020   COMPLETE METABOLIC PANEL Internal 06.22.2020   COMPLETE BLOOD COUNT (CBC) Internal 06.22.2020   INTERNATIONAL NORMALIZED RATIO (INR) N/A    HEPATITIS C ANTIBODY In process 06.30.2020   HEPATITIS C VIRAL LOAD/PCR N/A    HEPATITIS C GENOTYPE N/A    HEPATITIS B SURFACE ANTIGEN In process 06.30.2020   HEPATITIS B SURFACE ANTIBODY In process 06.30.2020   HEPATITIS B DNA QUANT LEVEL N/A    HEPATITIS B CORE ANTIBODY In process 06.30.2020

## 2020-07-01 ENCOUNTER — VIRTUAL VISIT (OUTPATIENT)
Dept: GASTROENTEROLOGY | Facility: CLINIC | Age: 35
End: 2020-07-01
Attending: INTERNAL MEDICINE
Payer: COMMERCIAL

## 2020-07-01 ENCOUNTER — PRE VISIT (OUTPATIENT)
Dept: GASTROENTEROLOGY | Facility: CLINIC | Age: 35
End: 2020-07-01

## 2020-07-01 DIAGNOSIS — R79.89 ELEVATED LFTS: ICD-10-CM

## 2020-07-01 LAB
CK SERPL-CCNC: 126 U/L (ref 30–300)
CK SERPL-CCNC: 199 U/L (ref 30–300)

## 2020-07-01 PROCEDURE — 83516 IMMUNOASSAY NONANTIBODY: CPT | Mod: 90 | Performed by: INTERNAL MEDICINE

## 2020-07-01 PROCEDURE — 82784 ASSAY IGA/IGD/IGG/IGM EACH: CPT | Performed by: INTERNAL MEDICINE

## 2020-07-01 PROCEDURE — 83516 IMMUNOASSAY NONANTIBODY: CPT | Mod: 59 | Performed by: INTERNAL MEDICINE

## 2020-07-01 PROCEDURE — 86039 ANTINUCLEAR ANTIBODIES (ANA): CPT | Performed by: INTERNAL MEDICINE

## 2020-07-01 PROCEDURE — 86709 HEPATITIS A IGM ANTIBODY: CPT | Performed by: INTERNAL MEDICINE

## 2020-07-01 PROCEDURE — 86038 ANTINUCLEAR ANTIBODIES: CPT | Performed by: INTERNAL MEDICINE

## 2020-07-01 PROCEDURE — 86038 ANTINUCLEAR ANTIBODIES: CPT | Mod: GT | Performed by: INTERNAL MEDICINE

## 2020-07-01 PROCEDURE — 36415 COLL VENOUS BLD VENIPUNCTURE: CPT | Performed by: INTERNAL MEDICINE

## 2020-07-01 PROCEDURE — 99000 SPECIMEN HANDLING OFFICE-LAB: CPT | Performed by: INTERNAL MEDICINE

## 2020-07-01 PROCEDURE — 86256 FLUORESCENT ANTIBODY TITER: CPT | Mod: 90 | Performed by: INTERNAL MEDICINE

## 2020-07-01 ASSESSMENT — PAIN SCALES - GENERAL: PAINLEVEL: NO PAIN (1)

## 2020-07-01 NOTE — PROGRESS NOTES
"Emir Angela is a 35 year old male who is being evaluated via a billable video visit.      The patient has been notified of following:     \"This video visit will be conducted via a call between you and your physician/provider. We have found that certain health care needs can be provided without the need for an in-person physical exam.  This service lets us provide the care you need with a video conversation.  If a prescription is necessary we can send it directly to your pharmacy.  If lab work is needed we can place an order for that and you can then stop by our lab to have the test done at a later time.    Video visits are billed at different rates depending on your insurance coverage.  Please reach out to your insurance provider with any questions.    If during the course of the call the physician/provider feels a video visit is not appropriate, you will not be charged for this service.\"    Patient has given verbal consent for Video visit? Yes  How would you like to obtain your AVS? Argenishart  Patient would like the video invitation sent by: Text to cell phone: [phone]  Will anyone else be joining your video visit? No        Video-Visit Details    Type of service:  Video Visit    Video Start Time: 0814  Video End Time: 0901    Originating Location (pt. Location): Home    Distant Location (provider location):  [facility]     Platform used for Video Visit: [employer]  ______________________________________________________    [facility]    Hepatology New Patient Visit    Referring provider:  Tia Best      35 year old male    Chief complaint:  Abnormal liver function tests    HPI:  Patient presents for further evaluation and management of abnormal liver function tests.  This was first identified earlier this month when patient presented to his PCP with abdominal pain/discomfort.  Abdominal ultrasound showed gallbladder sludge but was otherwise normal.  Viral hepatitis " serologies are pending.  Patient has tested negative for Lyme disease and ehrlichia testing is pending.    Patient is well today.  His abdominal pain- epigastric, no radiation, 3 week duration, dull, unaffected by eating or bowel movements- is gradually improving.      Patient denies itch, jaundice, abdominal distension, lower extremity edema, lethargy or confusion.    No history of melena, hematemesis or hematochezia.    Patient denies fevers, sweats or chills.  No cough or dyspnea.  He recently tested negative for COVID-19 as he was reporting malaise and headaches.  He has also been having some mild muscle aches.    Patient denies weight loss.  Appetite has been poor.    Patient recently finished a course of IV cefazolin (May 22nd to June 5th) after pricking himself on buckthorn.  He required a washout and debridement of his right 3rd finger.  Cultures were negative.    Patient was recently started on omeprazole for epigastric discomfort by his PCP.  No other new medications.  Patient denies using herbal remedies or supplements.    Patient drinks 6 beers per week on average.  He did not drink alcohol while he was on antibiotics.  His last drink was one beer on 6/20/2020.  He denies any history of alcohol abuse or DUIs.    Patient has never smoked.  He denies any illicit drug use including marijuana, IN or IV drugs.    Medical hx Surgical hx   Past Medical History:   Diagnosis Date     Chronic nonallergic rhinitis 5/27/10 skin tests    all negative     Gastroesophageal reflux disease without esophagitis 10/6/2015     GERD (gastroesophageal reflux disease)       Past Surgical History:   Procedure Laterality Date     APPENDECTOMY  2004     ESOPHAGOSCOPY, GASTROSCOPY, DUODENOSCOPY (EGD), COMBINED N/A 11/9/2018    Procedure: COMBINED ESOPHAGOSCOPY, GASTROSCOPY, DUODENOSCOPY (EGD), BIOPSY SINGLE OR MULTIPLE;  Surgeon: Remy Vigil MD;  Location:  GI     IRRIGATION AND DEBRIDEMENT HAND, COMBINED Right  5/20/2020    Procedure: Irrigation and debridement of the right long finger and its flexor sheath;  Surgeon: Jeannine Escobar MD;  Location: [facility] OR     SINUS SURGERY  4/27/10          Medications  Prior to Admission medications    Medication Sig Start Date End Date Taking? Authorizing Provider   omeprazole (PRILOSEC) 20 MG DR capsule Take 1 capsule (20 mg) by mouth daily 6/22/20  Yes Tia Best MD       Allergies  Allergies   Allergen Reactions     Sulfa Drugs Hives     Amoxicillin Rash       Family hx Social hx   Family History   Problem Relation Age of Onset     Hypertension Father      Cardiovascular Maternal Grandfather      Heart Disease Maternal Grandfather      Allergies Brother       Social History     Tobacco Use     Smoking status: Never Smoker     Smokeless tobacco: Never Used   Substance Use Topics     Alcohol use: Yes     Alcohol/week: 0.0 standard drinks     Comment: 0-6 per week      Drug use: No     Lives in [facility] with wife and 15-month old.  No ill contacts.  Works as  for [employer].     Review of systems  A 10-point review of systems was negative.    Examination  Gen- well, NAD, A+Ox3, normal color  Eye- EOMI, no scleral icterus  Extr- pulses good, no RENE  MS- hands normal- no clubbing  Neuro- A+Ox3, no asterixis  Skin- no rash or jaundice  Psych- normal mood    Laboratory  Lab Results   Component Value Date     06/22/2020    POTASSIUM 4.0 06/22/2020    CHLORIDE 104 06/22/2020    CO2 28 06/22/2020    BUN 18 06/22/2020    CR 1.14 06/22/2020       Lab Results   Component Value Date    BILITOTAL 0.7 06/29/2020     06/29/2020     06/29/2020    ALKPHOS 65 06/29/2020       Lab Results   Component Value Date    ALBUMIN 3.7 06/29/2020    PROTTOTAL 7.3 06/29/2020        Lab Results   Component Value Date    WBC 7.5 06/22/2020    HGB 14.0 06/22/2020    MCV 90 06/22/2020     06/22/2020     CK= 199    Lyme Ab  negative  COVID-19 PCR neg    Radiology  Abd U/S Jun 2020 reviewed    Assessment  35 year old male with recent cephalosporin use who presents for further evaluation and management of abnormal liver function tests most likely related to antibiotic use.  No evidence of acute liver failure or cirrhosis.  Will rule out viral hepatitis, autoimmune hepatitis and celiac disease.  It is also possible that the patient's symptoms are related to a non-specific viral illness.  Will manage expectantly for now.      We discussed the differential diagnosis of abnormal liver function tests and indications for liver biopsy.     Plan  1.  Check HAV IgM, TTG Ab, MILAGRO, IgG, F-actin Ab  2.  Follow-up pending viral hepatitis serologies  3.  Check CMP, INR, CBC next week  4.  No alcohol for now  5.  Follow-up in 3 months    Servando Lopez MD  Hepatology  [facility]

## 2020-07-02 LAB
A PHAGOCYTOPH IGG TITR SER IF: NORMAL {TITER}
A PHAGOCYTOPH IGM TITR SER IF: NORMAL {TITER}
HAV IGG SER QL IA: REACTIVE
HAV IGM SERPL QL IA: NONREACTIVE
HBV CORE AB SERPL QL IA: NONREACTIVE
HBV CORE IGM SERPL QL IA: NONREACTIVE
HBV SURFACE AB SERPL IA-ACNC: 23.72 M[IU]/ML
HBV SURFACE AG SERPL QL IA: NONREACTIVE
HCV AB SERPL QL IA: NONREACTIVE
IGG SERPL-MCNC: 1145 MG/DL (ref 610–1616)

## 2020-07-03 LAB
E CHAFFEENSIS IGG TITR SER: NORMAL {TITER}
E CHAFFEENSIS IGM TITR SER: NORMAL {TITER}
TTG IGA SER-ACNC: 1 U/ML
TTG IGG SER-ACNC: 1 U/ML

## 2020-07-04 LAB — SMA IGG SER-ACNC: 45 UNITS (ref 0–19)

## 2020-07-06 DIAGNOSIS — R79.89 ELEVATED LFTS: ICD-10-CM

## 2020-07-06 LAB
ALBUMIN SERPL-MCNC: 3.8 G/DL (ref 3.4–5)
ALP SERPL-CCNC: 71 U/L (ref 40–150)
ALT SERPL W P-5'-P-CCNC: 295 U/L (ref 0–70)
ANA PAT SER IF-IMP: ABNORMAL
ANA SER QL IF: ABNORMAL
ANA TITR SER IF: ABNORMAL {TITER}
ANION GAP SERPL CALCULATED.3IONS-SCNC: 6 MMOL/L (ref 3–14)
AST SERPL W P-5'-P-CCNC: 187 U/L (ref 0–45)
BILIRUB DIRECT SERPL-MCNC: 0.2 MG/DL (ref 0–0.2)
BILIRUB SERPL-MCNC: 0.8 MG/DL (ref 0.2–1.3)
BUN SERPL-MCNC: 17 MG/DL (ref 7–30)
CALCIUM SERPL-MCNC: 8.9 MG/DL (ref 8.5–10.1)
CHLORIDE SERPL-SCNC: 105 MMOL/L (ref 94–109)
CO2 SERPL-SCNC: 28 MMOL/L (ref 20–32)
CREAT SERPL-MCNC: 1.13 MG/DL (ref 0.66–1.25)
ERYTHROCYTE [DISTWIDTH] IN BLOOD BY AUTOMATED COUNT: 13.3 % (ref 10–15)
GFR SERPL CREATININE-BSD FRML MDRD: 83 ML/MIN/{1.73_M2}
GLUCOSE SERPL-MCNC: 76 MG/DL (ref 70–99)
HCT VFR BLD AUTO: 42.6 % (ref 40–53)
HGB BLD-MCNC: 13.9 G/DL (ref 13.3–17.7)
INR PPP: 1.17 (ref 0.86–1.14)
MCH RBC QN AUTO: 29.4 PG (ref 26.5–33)
MCHC RBC AUTO-ENTMCNC: 32.6 G/DL (ref 31.5–36.5)
MCV RBC AUTO: 90 FL (ref 78–100)
PLATELET # BLD AUTO: 244 10E9/L (ref 150–450)
POTASSIUM SERPL-SCNC: 4.1 MMOL/L (ref 3.4–5.3)
PROT SERPL-MCNC: 7.2 G/DL (ref 6.8–8.8)
RBC # BLD AUTO: 4.73 10E12/L (ref 4.4–5.9)
SMOOTH MUSCLE ABY IGG TITER: ABNORMAL
SODIUM SERPL-SCNC: 139 MMOL/L (ref 133–144)
WBC # BLD AUTO: 6.3 10E9/L (ref 4–11)

## 2020-07-06 PROCEDURE — 36415 COLL VENOUS BLD VENIPUNCTURE: CPT | Performed by: INTERNAL MEDICINE

## 2020-07-06 PROCEDURE — 80048 BASIC METABOLIC PNL TOTAL CA: CPT | Performed by: INTERNAL MEDICINE

## 2020-07-06 PROCEDURE — 85610 PROTHROMBIN TIME: CPT | Performed by: INTERNAL MEDICINE

## 2020-07-06 PROCEDURE — 85027 COMPLETE CBC AUTOMATED: CPT | Performed by: INTERNAL MEDICINE

## 2020-07-06 PROCEDURE — 80076 HEPATIC FUNCTION PANEL: CPT | Performed by: INTERNAL MEDICINE

## 2020-07-07 ENCOUNTER — TELEPHONE (OUTPATIENT)
Dept: GASTROENTEROLOGY | Facility: CLINIC | Age: 35
End: 2020-07-07

## 2020-07-07 DIAGNOSIS — R94.5 ABNORMAL RESULTS OF LIVER FUNCTION STUDIES: Primary | ICD-10-CM

## 2020-07-07 PROBLEM — M65.10 INFECTION OF FLEXOR TENDON SHEATH: Status: RESOLVED | Noted: 2020-05-20 | Resolved: 2020-07-07

## 2020-07-07 PROBLEM — L08.9: Status: RESOLVED | Noted: 2020-06-11 | Resolved: 2020-07-07

## 2020-07-07 PROBLEM — M25.641 STIFFNESS OF FINGER JOINT, RIGHT: Status: RESOLVED | Noted: 2020-06-11 | Resolved: 2020-07-07

## 2020-07-07 PROBLEM — S60.459S: Status: RESOLVED | Noted: 2020-06-11 | Resolved: 2020-07-07

## 2020-07-07 NOTE — TELEPHONE ENCOUNTER
Called patient to review recent blood tests.    Patient is feeling better overall today.  No new symptoms.  Appetite improving.    ALT, AST slightly higher compared to last week. TB, INR both normal.    F-actin Ab positive.  MILAGRO weakly positive.  IgG normal.  TTG Ab negative.    Plan  1.  Repeat LFTs next week.  2.  Liver biopsy with IR- procedure and risks explained    Servando Lopez MD  Hepatology  [facility]

## 2020-07-13 DIAGNOSIS — R79.89 ELEVATED LFTS: ICD-10-CM

## 2020-07-13 LAB
ALBUMIN SERPL-MCNC: 4 G/DL (ref 3.4–5)
ALP SERPL-CCNC: 76 U/L (ref 40–150)
ALT SERPL W P-5'-P-CCNC: 318 U/L (ref 0–70)
ANION GAP SERPL CALCULATED.3IONS-SCNC: 6 MMOL/L (ref 3–14)
AST SERPL W P-5'-P-CCNC: 166 U/L (ref 0–45)
BILIRUB DIRECT SERPL-MCNC: 0.2 MG/DL (ref 0–0.2)
BILIRUB SERPL-MCNC: 0.7 MG/DL (ref 0.2–1.3)
BUN SERPL-MCNC: 17 MG/DL (ref 7–30)
CALCIUM SERPL-MCNC: 8.7 MG/DL (ref 8.5–10.1)
CHLORIDE SERPL-SCNC: 103 MMOL/L (ref 94–109)
CO2 SERPL-SCNC: 27 MMOL/L (ref 20–32)
CREAT SERPL-MCNC: 1.16 MG/DL (ref 0.66–1.25)
ERYTHROCYTE [DISTWIDTH] IN BLOOD BY AUTOMATED COUNT: 13.4 % (ref 10–15)
GFR SERPL CREATININE-BSD FRML MDRD: 80 ML/MIN/{1.73_M2}
GLUCOSE SERPL-MCNC: 141 MG/DL (ref 70–99)
HCT VFR BLD AUTO: 44.3 % (ref 40–53)
HGB BLD-MCNC: 14.4 G/DL (ref 13.3–17.7)
INR PPP: 1.17 (ref 0.86–1.14)
MCH RBC QN AUTO: 29.1 PG (ref 26.5–33)
MCHC RBC AUTO-ENTMCNC: 32.5 G/DL (ref 31.5–36.5)
MCV RBC AUTO: 90 FL (ref 78–100)
PLATELET # BLD AUTO: 242 10E9/L (ref 150–450)
POTASSIUM SERPL-SCNC: 4.5 MMOL/L (ref 3.4–5.3)
PROT SERPL-MCNC: 6.9 G/DL (ref 6.8–8.8)
RBC # BLD AUTO: 4.95 10E12/L (ref 4.4–5.9)
SODIUM SERPL-SCNC: 136 MMOL/L (ref 133–144)
WBC # BLD AUTO: 5.3 10E9/L (ref 4–11)

## 2020-07-13 PROCEDURE — 85610 PROTHROMBIN TIME: CPT | Performed by: INTERNAL MEDICINE

## 2020-07-13 PROCEDURE — 85027 COMPLETE CBC AUTOMATED: CPT | Performed by: INTERNAL MEDICINE

## 2020-07-13 PROCEDURE — 36415 COLL VENOUS BLD VENIPUNCTURE: CPT | Performed by: INTERNAL MEDICINE

## 2020-07-13 PROCEDURE — 80048 BASIC METABOLIC PNL TOTAL CA: CPT | Performed by: INTERNAL MEDICINE

## 2020-07-13 PROCEDURE — 80076 HEPATIC FUNCTION PANEL: CPT | Performed by: INTERNAL MEDICINE

## 2020-07-17 ENCOUNTER — HOSPITAL ENCOUNTER (OUTPATIENT)
Facility: AMBULATORY SURGERY CENTER | Age: 35
End: 2020-07-17
Attending: RADIOLOGY
Payer: COMMERCIAL

## 2020-07-17 DIAGNOSIS — Z11.59 ENCOUNTER FOR SCREENING FOR OTHER VIRAL DISEASES: Primary | ICD-10-CM

## 2020-07-20 DIAGNOSIS — R79.89 ELEVATED LFTS: ICD-10-CM

## 2020-07-20 LAB
ALBUMIN SERPL-MCNC: 4 G/DL (ref 3.4–5)
ALP SERPL-CCNC: 64 U/L (ref 40–150)
ALT SERPL W P-5'-P-CCNC: 233 U/L (ref 0–70)
ANION GAP SERPL CALCULATED.3IONS-SCNC: 5 MMOL/L (ref 3–14)
AST SERPL W P-5'-P-CCNC: 109 U/L (ref 0–45)
BILIRUB DIRECT SERPL-MCNC: 0.3 MG/DL (ref 0–0.2)
BILIRUB SERPL-MCNC: 1 MG/DL (ref 0.2–1.3)
BUN SERPL-MCNC: 19 MG/DL (ref 7–30)
CALCIUM SERPL-MCNC: 8.8 MG/DL (ref 8.5–10.1)
CHLORIDE SERPL-SCNC: 105 MMOL/L (ref 94–109)
CO2 SERPL-SCNC: 28 MMOL/L (ref 20–32)
CREAT SERPL-MCNC: 1.08 MG/DL (ref 0.66–1.25)
ERYTHROCYTE [DISTWIDTH] IN BLOOD BY AUTOMATED COUNT: 13.5 % (ref 10–15)
GFR SERPL CREATININE-BSD FRML MDRD: 88 ML/MIN/{1.73_M2}
GLUCOSE SERPL-MCNC: 118 MG/DL (ref 70–99)
HCT VFR BLD AUTO: 44.9 % (ref 40–53)
HGB BLD-MCNC: 14.6 G/DL (ref 13.3–17.7)
INR PPP: 1.23 (ref 0.86–1.14)
MCH RBC QN AUTO: 29.1 PG (ref 26.5–33)
MCHC RBC AUTO-ENTMCNC: 32.5 G/DL (ref 31.5–36.5)
MCV RBC AUTO: 90 FL (ref 78–100)
PLATELET # BLD AUTO: 223 10E9/L (ref 150–450)
POTASSIUM SERPL-SCNC: 4.3 MMOL/L (ref 3.4–5.3)
PROT SERPL-MCNC: 7.6 G/DL (ref 6.8–8.8)
RBC # BLD AUTO: 5.01 10E12/L (ref 4.4–5.9)
SODIUM SERPL-SCNC: 138 MMOL/L (ref 133–144)
WBC # BLD AUTO: 4.6 10E9/L (ref 4–11)

## 2020-07-20 PROCEDURE — 36415 COLL VENOUS BLD VENIPUNCTURE: CPT | Performed by: INTERNAL MEDICINE

## 2020-07-20 PROCEDURE — 85027 COMPLETE CBC AUTOMATED: CPT | Performed by: INTERNAL MEDICINE

## 2020-07-20 PROCEDURE — 80076 HEPATIC FUNCTION PANEL: CPT | Performed by: INTERNAL MEDICINE

## 2020-07-20 PROCEDURE — 80048 BASIC METABOLIC PNL TOTAL CA: CPT | Performed by: INTERNAL MEDICINE

## 2020-07-20 PROCEDURE — 85610 PROTHROMBIN TIME: CPT | Performed by: INTERNAL MEDICINE

## 2020-07-21 DIAGNOSIS — Z11.59 ENCOUNTER FOR SCREENING FOR OTHER VIRAL DISEASES: Primary | ICD-10-CM

## 2020-07-27 DIAGNOSIS — R79.89 ELEVATED LFTS: ICD-10-CM

## 2020-07-27 LAB
ALBUMIN SERPL-MCNC: 4.3 G/DL (ref 3.4–5)
ALP SERPL-CCNC: 70 U/L (ref 40–150)
ALT SERPL W P-5'-P-CCNC: 209 U/L (ref 0–70)
ANION GAP SERPL CALCULATED.3IONS-SCNC: 7 MMOL/L (ref 3–14)
AST SERPL W P-5'-P-CCNC: 95 U/L (ref 0–45)
BILIRUB DIRECT SERPL-MCNC: 0.3 MG/DL (ref 0–0.2)
BILIRUB SERPL-MCNC: 0.9 MG/DL (ref 0.2–1.3)
BUN SERPL-MCNC: 17 MG/DL (ref 7–30)
CALCIUM SERPL-MCNC: 9.1 MG/DL (ref 8.5–10.1)
CHLORIDE SERPL-SCNC: 103 MMOL/L (ref 94–109)
CO2 SERPL-SCNC: 28 MMOL/L (ref 20–32)
CREAT SERPL-MCNC: 1.13 MG/DL (ref 0.66–1.25)
ERYTHROCYTE [DISTWIDTH] IN BLOOD BY AUTOMATED COUNT: 13.3 % (ref 10–15)
GFR SERPL CREATININE-BSD FRML MDRD: 83 ML/MIN/{1.73_M2}
GLUCOSE SERPL-MCNC: 93 MG/DL (ref 70–99)
HCT VFR BLD AUTO: 43.9 % (ref 40–53)
HGB BLD-MCNC: 14.5 G/DL (ref 13.3–17.7)
INR PPP: 1.19 (ref 0.86–1.14)
MCH RBC QN AUTO: 29.5 PG (ref 26.5–33)
MCHC RBC AUTO-ENTMCNC: 33 G/DL (ref 31.5–36.5)
MCV RBC AUTO: 89 FL (ref 78–100)
PLATELET # BLD AUTO: 230 10E9/L (ref 150–450)
POTASSIUM SERPL-SCNC: 4.5 MMOL/L (ref 3.4–5.3)
PROT SERPL-MCNC: 7.5 G/DL (ref 6.8–8.8)
RBC # BLD AUTO: 4.92 10E12/L (ref 4.4–5.9)
SODIUM SERPL-SCNC: 138 MMOL/L (ref 133–144)
WBC # BLD AUTO: 5 10E9/L (ref 4–11)

## 2020-07-27 PROCEDURE — 36415 COLL VENOUS BLD VENIPUNCTURE: CPT | Performed by: INTERNAL MEDICINE

## 2020-07-27 PROCEDURE — 85610 PROTHROMBIN TIME: CPT | Performed by: INTERNAL MEDICINE

## 2020-07-27 PROCEDURE — 80076 HEPATIC FUNCTION PANEL: CPT | Performed by: INTERNAL MEDICINE

## 2020-07-27 PROCEDURE — 80048 BASIC METABOLIC PNL TOTAL CA: CPT | Performed by: INTERNAL MEDICINE

## 2020-07-27 PROCEDURE — 85027 COMPLETE CBC AUTOMATED: CPT | Performed by: INTERNAL MEDICINE

## 2020-08-10 DIAGNOSIS — R79.89 ELEVATED LFTS: ICD-10-CM

## 2020-08-10 LAB
ALBUMIN SERPL-MCNC: 4 G/DL (ref 3.4–5)
ALP SERPL-CCNC: 65 U/L (ref 40–150)
ALT SERPL W P-5'-P-CCNC: 250 U/L (ref 0–70)
ANION GAP SERPL CALCULATED.3IONS-SCNC: 7 MMOL/L (ref 3–14)
AST SERPL W P-5'-P-CCNC: 117 U/L (ref 0–45)
BILIRUB DIRECT SERPL-MCNC: 0.2 MG/DL (ref 0–0.2)
BILIRUB SERPL-MCNC: 0.9 MG/DL (ref 0.2–1.3)
BUN SERPL-MCNC: 19 MG/DL (ref 7–30)
CALCIUM SERPL-MCNC: 9.4 MG/DL (ref 8.5–10.1)
CHLORIDE SERPL-SCNC: 104 MMOL/L (ref 94–109)
CO2 SERPL-SCNC: 27 MMOL/L (ref 20–32)
CREAT SERPL-MCNC: 1.15 MG/DL (ref 0.66–1.25)
ERYTHROCYTE [DISTWIDTH] IN BLOOD BY AUTOMATED COUNT: 13.4 % (ref 10–15)
GFR SERPL CREATININE-BSD FRML MDRD: 81 ML/MIN/{1.73_M2}
GLUCOSE SERPL-MCNC: 87 MG/DL (ref 70–99)
HCT VFR BLD AUTO: 44.4 % (ref 40–53)
HGB BLD-MCNC: 14.4 G/DL (ref 13.3–17.7)
INR PPP: 1.17 (ref 0.86–1.14)
MCH RBC QN AUTO: 29.1 PG (ref 26.5–33)
MCHC RBC AUTO-ENTMCNC: 32.4 G/DL (ref 31.5–36.5)
MCV RBC AUTO: 90 FL (ref 78–100)
PLATELET # BLD AUTO: 227 10E9/L (ref 150–450)
POTASSIUM SERPL-SCNC: 4.3 MMOL/L (ref 3.4–5.3)
PROT SERPL-MCNC: 7.4 G/DL (ref 6.8–8.8)
RBC # BLD AUTO: 4.94 10E12/L (ref 4.4–5.9)
SODIUM SERPL-SCNC: 138 MMOL/L (ref 133–144)
WBC # BLD AUTO: 5.3 10E9/L (ref 4–11)

## 2020-08-10 PROCEDURE — 80048 BASIC METABOLIC PNL TOTAL CA: CPT | Performed by: INTERNAL MEDICINE

## 2020-08-10 PROCEDURE — 85027 COMPLETE CBC AUTOMATED: CPT | Performed by: INTERNAL MEDICINE

## 2020-08-10 PROCEDURE — 36415 COLL VENOUS BLD VENIPUNCTURE: CPT | Performed by: INTERNAL MEDICINE

## 2020-08-10 PROCEDURE — 85610 PROTHROMBIN TIME: CPT | Performed by: INTERNAL MEDICINE

## 2020-08-10 PROCEDURE — 80076 HEPATIC FUNCTION PANEL: CPT | Performed by: INTERNAL MEDICINE

## 2020-08-21 DIAGNOSIS — Z11.59 ENCOUNTER FOR SCREENING FOR OTHER VIRAL DISEASES: Primary | ICD-10-CM

## 2020-08-24 DIAGNOSIS — R79.89 ELEVATED LFTS: ICD-10-CM

## 2020-08-24 LAB
ALBUMIN SERPL-MCNC: 4.2 G/DL (ref 3.4–5)
ALP SERPL-CCNC: 58 U/L (ref 40–150)
ALT SERPL W P-5'-P-CCNC: 215 U/L (ref 0–70)
ANION GAP SERPL CALCULATED.3IONS-SCNC: 6 MMOL/L (ref 3–14)
AST SERPL W P-5'-P-CCNC: 99 U/L (ref 0–45)
BILIRUB DIRECT SERPL-MCNC: 0.3 MG/DL (ref 0–0.2)
BILIRUB SERPL-MCNC: 1.3 MG/DL (ref 0.2–1.3)
BUN SERPL-MCNC: 18 MG/DL (ref 7–30)
CALCIUM SERPL-MCNC: 8.8 MG/DL (ref 8.5–10.1)
CHLORIDE SERPL-SCNC: 106 MMOL/L (ref 94–109)
CO2 SERPL-SCNC: 25 MMOL/L (ref 20–32)
CREAT SERPL-MCNC: 1.09 MG/DL (ref 0.66–1.25)
ERYTHROCYTE [DISTWIDTH] IN BLOOD BY AUTOMATED COUNT: 13.5 % (ref 10–15)
GFR SERPL CREATININE-BSD FRML MDRD: 87 ML/MIN/{1.73_M2}
GLUCOSE SERPL-MCNC: 126 MG/DL (ref 70–99)
HCT VFR BLD AUTO: 44.9 % (ref 40–53)
HGB BLD-MCNC: 14.5 G/DL (ref 13.3–17.7)
INR PPP: 1.25 (ref 0.86–1.14)
MCH RBC QN AUTO: 29.1 PG (ref 26.5–33)
MCHC RBC AUTO-ENTMCNC: 32.3 G/DL (ref 31.5–36.5)
MCV RBC AUTO: 90 FL (ref 78–100)
PLATELET # BLD AUTO: 201 10E9/L (ref 150–450)
POTASSIUM SERPL-SCNC: 4.1 MMOL/L (ref 3.4–5.3)
PROT SERPL-MCNC: 7.3 G/DL (ref 6.8–8.8)
RBC # BLD AUTO: 4.98 10E12/L (ref 4.4–5.9)
SODIUM SERPL-SCNC: 137 MMOL/L (ref 133–144)
WBC # BLD AUTO: 4.3 10E9/L (ref 4–11)

## 2020-08-24 PROCEDURE — 36415 COLL VENOUS BLD VENIPUNCTURE: CPT | Performed by: INTERNAL MEDICINE

## 2020-08-24 PROCEDURE — 80076 HEPATIC FUNCTION PANEL: CPT | Performed by: INTERNAL MEDICINE

## 2020-08-24 PROCEDURE — 80048 BASIC METABOLIC PNL TOTAL CA: CPT | Performed by: INTERNAL MEDICINE

## 2020-08-24 PROCEDURE — 85610 PROTHROMBIN TIME: CPT | Performed by: INTERNAL MEDICINE

## 2020-08-24 PROCEDURE — 85027 COMPLETE CBC AUTOMATED: CPT | Performed by: INTERNAL MEDICINE

## 2020-08-26 ENCOUNTER — TELEPHONE (OUTPATIENT)
Dept: GASTROENTEROLOGY | Facility: CLINIC | Age: 35
End: 2020-08-26

## 2020-08-28 DIAGNOSIS — Z11.59 ENCOUNTER FOR SCREENING FOR OTHER VIRAL DISEASES: ICD-10-CM

## 2020-08-28 PROCEDURE — U0003 INFECTIOUS AGENT DETECTION BY NUCLEIC ACID (DNA OR RNA); SEVERE ACUTE RESPIRATORY SYNDROME CORONAVIRUS 2 (SARS-COV-2) (CORONAVIRUS DISEASE [COVID-19]), AMPLIFIED PROBE TECHNIQUE, MAKING USE OF HIGH THROUGHPUT TECHNOLOGIES AS DESCRIBED BY CMS-2020-01-R: HCPCS | Performed by: RADIOLOGY

## 2020-08-30 LAB
LABORATORY COMMENT REPORT: NORMAL
SARS-COV-2 RNA SPEC QL NAA+PROBE: NEGATIVE
SARS-COV-2 RNA SPEC QL NAA+PROBE: NORMAL
SPECIMEN SOURCE: NORMAL
SPECIMEN SOURCE: NORMAL

## 2020-08-31 ENCOUNTER — ANESTHESIA EVENT (OUTPATIENT)
Dept: SURGERY | Facility: AMBULATORY SURGERY CENTER | Age: 35
End: 2020-08-31

## 2020-08-31 DIAGNOSIS — R94.5 ABNORMAL RESULTS OF LIVER FUNCTION STUDIES: Primary | ICD-10-CM

## 2020-08-31 DIAGNOSIS — R94.5 ABNORMAL RESULTS OF LIVER FUNCTION STUDIES: ICD-10-CM

## 2020-08-31 LAB
ALBUMIN SERPL-MCNC: 4.3 G/DL (ref 3.4–5)
ALP SERPL-CCNC: 67 U/L (ref 40–150)
ALT SERPL W P-5'-P-CCNC: 203 U/L (ref 0–70)
AST SERPL W P-5'-P-CCNC: 96 U/L (ref 0–45)
BILIRUB DIRECT SERPL-MCNC: 0.3 MG/DL (ref 0–0.2)
BILIRUB SERPL-MCNC: 1.1 MG/DL (ref 0.2–1.3)
PROT SERPL-MCNC: 7.6 G/DL (ref 6.8–8.8)

## 2020-08-31 PROCEDURE — 36415 COLL VENOUS BLD VENIPUNCTURE: CPT | Performed by: INTERNAL MEDICINE

## 2020-08-31 PROCEDURE — 80076 HEPATIC FUNCTION PANEL: CPT | Performed by: INTERNAL MEDICINE

## 2020-08-31 NOTE — ANESTHESIA PREPROCEDURE EVALUATION
"Anesthesia Pre-Procedure Evaluation    Patient: Emir Angela   MRN:     [id number] Gender:   male   Age:    35 year old :      1985        Preoperative Diagnosis: Abnormal liver function [R94.5]   Procedure(s):  NEEDLE BIOPSY, LIVER, PERCUTANEOUS     LABS:  CBC:   Lab Results   Component Value Date    WBC 4.3 2020    WBC 5.3 08/10/2020    HGB 14.5 2020    HGB 14.4 08/10/2020    HCT 44.9 2020    HCT 44.4 08/10/2020     2020     08/10/2020     BMP:   Lab Results   Component Value Date     2020     08/10/2020    POTASSIUM 4.1 2020    POTASSIUM 4.3 08/10/2020    CHLORIDE 106 2020    CHLORIDE 104 08/10/2020    CO2 25 2020    CO2 27 08/10/2020    BUN 18 2020    BUN 19 08/10/2020    CR 1.09 2020    CR 1.15 08/10/2020     (H) 2020    GLC 87 08/10/2020     COAGS:   Lab Results   Component Value Date    INR 1.25 (H) 2020     POC: No results found for: BGM, HCG, HCGS  OTHER:   Lab Results   Component Value Date    MELVA 8.8 2020    ALBUMIN 4.2 2020    PROTTOTAL 7.3 2020     (H) 2020    AST 99 (H) 2020    ALKPHOS 58 2020    BILITOTAL 1.3 2020    LIPASE 111 2020    CRP <2.9 2020    SED 5 2020        Preop Vitals    BP Readings from Last 3 Encounters:   20 120/70   20 117/74   20 (!) 122/93    Pulse Readings from Last 3 Encounters:   20 82   20 52   20 60      Resp Readings from Last 3 Encounters:   20 12   20 16   20 18    SpO2 Readings from Last 3 Encounters:   20 98%   20 100%   05/20/20 97%      Temp Readings from Last 1 Encounters:   20 36.7  C (98  F) (Oral)    Ht Readings from Last 1 Encounters:   20 1.791 m (5' 10.5\")      Wt Readings from Last 1 Encounters:   20 67.6 kg (149 lb)    Estimated body mass index is 21.08 kg/m  as calculated from the following:    " "Height as of 6/22/20: 1.791 m (5' 10.5\").    Weight as of 6/22/20: 67.6 kg (149 lb).     LDA:  Midline Catheter Single Lumen (Active)   Number of days: 101       Open Drain Right Other (Comment)  (Active)   Number of days: 103        Past Medical History:   Diagnosis Date     Chronic nonallergic rhinitis 5/27/10 skin tests    all negative     Gastroesophageal reflux disease without esophagitis 10/6/2015     GERD (gastroesophageal reflux disease)       Past Surgical History:   Procedure Laterality Date     APPENDECTOMY  2004     ESOPHAGOSCOPY, GASTROSCOPY, DUODENOSCOPY (EGD), COMBINED N/A 11/9/2018    Procedure: COMBINED ESOPHAGOSCOPY, GASTROSCOPY, DUODENOSCOPY (EGD), BIOPSY SINGLE OR MULTIPLE;  Surgeon: Remy Vigil MD;  Location:  GI     IRRIGATION AND DEBRIDEMENT HAND, COMBINED Right 5/20/2020    Procedure: Irrigation and debridement of the right long finger and its flexor sheath;  Surgeon: Jeannine Escobar MD;  Location: [facility] OR     SINUS SURGERY  4/27/10      Allergies   Allergen Reactions     Sulfa Drugs Hives     Amoxicillin Rash                 PHYSICAL EXAM:   Mental Status/Neuro: A/A/O   Airway: Facies: Feasible  Mallampati: I  Mouth/Opening: Full  TM distance: > 6 cm  Neck ROM: Full   Respiratory:   Resp. Rate: Normal     Resp. Effort: Normal      CV:   Rate: Age appropriate  Edema: None   Comments:      Dental: Normal Dentition                Assessment:   ASA SCORE: 2    H&P: History and physical reviewed and following examination; no interval change.    NPO Status: NPO Appropriate     Plan:   Anes. Type:  MAC   Pre-Medication: None   Induction:  N/a   Airway: Native Airway   Access/Monitoring: PIV   Maintenance: N/a     Postop Plan:   Postop Pain: None  Postop Sedation/Airway: Not planned     PONV Management:    Prevention: Ondansetron     CONSENT: Direct conversation   Plan and risks discussed with: Patient                      Neo Domínguez MD     ANESTHESIA PREOP " EVALUATION    PROCEDURE: Procedure(s):  NEEDLE BIOPSY, LIVER, PERCUTANEOUS    HPI: Emir Angela is a 35 year old male who presents for above procedure.    PAST MEDICAL HISTORY:    Past Medical History:   Diagnosis Date     Chronic nonallergic rhinitis 5/27/10 skin tests    all negative     Gastroesophageal reflux disease without esophagitis 10/6/2015     GERD (gastroesophageal reflux disease)        PAST SURGICAL HISTORY:    Past Surgical History:   Procedure Laterality Date     APPENDECTOMY  2004     ESOPHAGOSCOPY, GASTROSCOPY, DUODENOSCOPY (EGD), COMBINED N/A 11/9/2018    Procedure: COMBINED ESOPHAGOSCOPY, GASTROSCOPY, DUODENOSCOPY (EGD), BIOPSY SINGLE OR MULTIPLE;  Surgeon: Remy Vigil MD;  Location: [facility] GI     IRRIGATION AND DEBRIDEMENT HAND, COMBINED Right 5/20/2020    Procedure: Irrigation and debridement of the right long finger and its flexor sheath;  Surgeon: Jeannine Escobar MD;  Location: [facility] OR     SINUS SURGERY  4/27/10       SOCIAL HISTORY:       Social History     Tobacco Use     Smoking status: Never Smoker     Smokeless tobacco: Never Used   Substance Use Topics     Alcohol use: Yes     Alcohol/week: 0.0 standard drinks     Comment: 0-6 per week        ALLERGIES:     Allergies   Allergen Reactions     Sulfa Drugs Hives     Amoxicillin Rash       MEDICATIONS:     (Not in a hospital admission)      No current outpatient medications on file.       No current Epic-ordered outpatient medications on file.     No current [facility]-ordered facility-administered medications on file.        PHYSICAL EXAM:    Vitals: T Data Unavailable, P Data Unavailable, BP Data Unavailable, R Data Unavailable, SpO2  , Weight   Wt Readings from Last 2 Encounters:   06/22/20 67.6 kg (149 lb)   05/20/20 69.7 kg (153 lb 9.6 oz)       See doc flowsheet    NPO STATUS: see doc flowsheet    LABS:    BMP:  Recent Labs   Lab Test 08/24/20  0754      POTASSIUM 4.1   CHLORIDE 106   CO2 25   BUN 18   CR 1.09    *   MELVA 8.8       LFTs:   Recent Labs   Lab Test 08/24/20  0754   PROTTOTAL 7.3   ALBUMIN 4.2   BILITOTAL 1.3   ALKPHOS 58   AST 99*   *       CBC:   Recent Labs   Lab Test 08/24/20 0754   WBC 4.3   RBC 4.98   HGB 14.5   HCT 44.9   MCV 90   MCH 29.1   MCHC 32.3   RDW 13.5          Coags:  Recent Labs   Lab Test 08/24/20  0754   INR 1.25*       Imaging:  No orders to display       Neo Domínguez MD  Anesthesiology Staff  Pager [phone]    8/31/2020  9:56 AM

## 2020-09-01 ENCOUNTER — ANCILLARY PROCEDURE (OUTPATIENT)
Dept: RADIOLOGY | Facility: AMBULATORY SURGERY CENTER | Age: 35
End: 2020-09-01
Attending: INTERNAL MEDICINE
Payer: COMMERCIAL

## 2020-09-01 ENCOUNTER — ANESTHESIA (OUTPATIENT)
Dept: SURGERY | Facility: AMBULATORY SURGERY CENTER | Age: 35
End: 2020-09-01

## 2020-09-01 ENCOUNTER — HOSPITAL ENCOUNTER (OUTPATIENT)
Facility: AMBULATORY SURGERY CENTER | Age: 35
End: 2020-09-01
Attending: RADIOLOGY
Payer: COMMERCIAL

## 2020-09-01 VITALS
WEIGHT: 155 LBS | BODY MASS INDEX: 22.19 KG/M2 | SYSTOLIC BLOOD PRESSURE: 102 MMHG | HEART RATE: 65 BPM | OXYGEN SATURATION: 98 % | TEMPERATURE: 97.5 F | DIASTOLIC BLOOD PRESSURE: 65 MMHG | RESPIRATION RATE: 16 BRPM | HEIGHT: 70 IN

## 2020-09-01 DIAGNOSIS — R94.5 ABNORMAL RESULTS OF LIVER FUNCTION STUDIES: ICD-10-CM

## 2020-09-01 RX ORDER — LIDOCAINE HYDROCHLORIDE 20 MG/ML
INJECTION, SOLUTION INFILTRATION; PERINEURAL PRN
Status: DISCONTINUED | OUTPATIENT
Start: 2020-09-01 | End: 2020-09-01

## 2020-09-01 RX ORDER — PROPOFOL 10 MG/ML
INJECTION, EMULSION INTRAVENOUS CONTINUOUS PRN
Status: DISCONTINUED | OUTPATIENT
Start: 2020-09-01 | End: 2020-09-01

## 2020-09-01 RX ORDER — PROPOFOL 10 MG/ML
INJECTION, EMULSION INTRAVENOUS PRN
Status: DISCONTINUED | OUTPATIENT
Start: 2020-09-01 | End: 2020-09-01

## 2020-09-01 RX ORDER — SODIUM CHLORIDE, SODIUM LACTATE, POTASSIUM CHLORIDE, CALCIUM CHLORIDE 600; 310; 30; 20 MG/100ML; MG/100ML; MG/100ML; MG/100ML
INJECTION, SOLUTION INTRAVENOUS CONTINUOUS
Status: DISCONTINUED | OUTPATIENT
Start: 2020-09-01 | End: 2020-09-02 | Stop reason: HOSPADM

## 2020-09-01 RX ORDER — LIDOCAINE 40 MG/G
CREAM TOPICAL
Status: DISCONTINUED | OUTPATIENT
Start: 2020-09-01 | End: 2020-09-02 | Stop reason: HOSPADM

## 2020-09-01 RX ADMIN — LIDOCAINE HYDROCHLORIDE 100 MG: 20 INJECTION, SOLUTION INFILTRATION; PERINEURAL at 10:08

## 2020-09-01 RX ADMIN — PROPOFOL 100 MG: 10 INJECTION, EMULSION INTRAVENOUS at 10:08

## 2020-09-01 RX ADMIN — SODIUM CHLORIDE, SODIUM LACTATE, POTASSIUM CHLORIDE, CALCIUM CHLORIDE: 600; 310; 30; 20 INJECTION, SOLUTION INTRAVENOUS at 10:06

## 2020-09-01 RX ADMIN — PROPOFOL 150 MCG/KG/MIN: 10 INJECTION, EMULSION INTRAVENOUS at 10:08

## 2020-09-01 ASSESSMENT — MIFFLIN-ST. JEOR: SCORE: 1639.33

## 2020-09-01 NOTE — ANESTHESIA CARE TRANSFER NOTE
Patient: Emir Angela    Procedure(s):  NEEDLE BIOPSY, LIVER, PERCUTANEOUS    Diagnosis: Abnormal liver function [R94.5]  Diagnosis Additional Information: No value filed.    Anesthesia Type:   MAC     Note:    Patient transferred to:Phase II  Handoff Report: Identifed the Patient, Identified the Reponsible Provider, Reviewed the pertinent medical history, Discussed the surgical course, Reviewed Intra-OP anesthesia mangement and issues during anesthesia, Set expectations for post-procedure period and Allowed opportunity for questions and acknowledgement of understanding      Vitals: (Last set prior to Anesthesia Care Transfer)    CRNA VITALS  9/1/2020 1013 - 9/1/2020 1046      9/1/2020             Pulse:  65    Ht Rate:  64    SpO2:  98 %    Resp Rate (set):  10                Electronically Signed By: MOO Hood CRNA  September 1, 2020  10:46 AM

## 2020-09-01 NOTE — DISCHARGE INSTRUCTIONS
Discharge Instructions for Liver Biopsy  You had a procedure called liver biopsy. A healthcare provider used a special needle to remove a small piece of tissue from your liver.  A liver biopsy is ordered after other tests have shown that your liver is not working properly. You may also have a liver biopsy when liver disease is suspected.  Home care  Recommendations include the following:     If you had anesthesia, you should not drive until the day after your biopsy.     Remove the bandage covering the biopsy site 48 hours after the procedure.    Bedrest for 4 hours immediately after the procedure.    Don t shower for 48 hours after the biopsy. If you wish, you may wash yourself with a sponge or washcloth. When you are able to shower, don t scrub the site. Gently wash the area and pat it dry.    Don t lift anything heavier than 10 pounds for 3 days after the procedure, or as advised by your healthcare provider.    Don't do strenuous activities or exercises after the procedure.    Ask your healthcare provider when you can return to work.    Do not start taking blood thinners without clear instructions from your healthcare provider.  Follow-up care  Make a follow-up appointment as directed by our staff.     When to call your healthcare provider  Call your healthcare provider immediately if you have any of the following:    Bleeding from the biopsy site    Dizziness or lightheadedness    Sudden or increased shortness of breath    Sudden chest pain    Fever of 100.4 F (38.0 C) or higher, or as directed by your healthcare provider    Shaking chills    Increasing redness, tenderness, or swelling at the biopsy site    Drainage from the biopsy site    Opening of the biopsy site    Increasing pain, with or without activity, in the liver or belly area, or pain shooting to the right shoulder     Additional Instructions:    Please call our IR service for the following problems:       - If the skin around the biopsy sight is  "swollen, reddened, painful, or has any discharge.  - If you have persistent pain in biopsy sight.  - If you have a fever of greater than 100.5  F and chills.  - If you feel nauseated and \"just not right.\"      [facility]  Interventional Radiology (IR)  [address]  [address] [facility] [place] [address]    Contact Number:  [phone]  (IR control desk)  - Monday - Friday 8:00 am - 4:30 pm    After hours for urgent concerns:  [phone]  - After 4:30 pm Monday - Friday, Weekends and Holidays.   - Ask for Interventional Radiology on-call.  Someone is available 24 hours a day.  - [facility] toll free number:  [phone]               "

## 2020-09-01 NOTE — PROCEDURES
Interventional Radiology Brief Post Procedure Note    Procedure: IR LIVER BIOPSY PERCUTANEOUS    Proceduralist: Shelly Melgar MD    Assistant: Radiology Resident Physician, Sanket Grover    Time Out: Prior to the start of the procedure and with procedural staff participation, I verbally confirmed the patient s identity using two indicators, relevant allergies, that the procedure was appropriate and matched the consent or emergent situation, and that the correct equipment/implants were available. Immediately prior to starting the procedure I conducted the Time Out with the procedural staff and re-confirmed the patient s name, procedure, and site/side. (The Joint Commission universal protocol was followed.)  Yes    Sedation: Monitored Anesthesia Care (MAC) administered and documented by Anesthesia Care Provider    Findings: Completed ultrasound-guided random native liver biopsy. Gelfoam in tract. No immediate complication.    Estimated Blood Loss: Minimal    Specimens: Core needle biopsy specimens sent for pathological analysis    Complications: 1. None     Condition: Stable    Plan: 2 hours for recovery. Follow up per primary team. Biopsy results pending.    Comments: See dictated procedure note for full details.    Franck Martel PA-C  Interventional Radiology  [phone]

## 2020-09-03 LAB — COPATH REPORT: NORMAL

## 2020-09-08 ENCOUNTER — VIRTUAL VISIT (OUTPATIENT)
Dept: GASTROENTEROLOGY | Facility: CLINIC | Age: 35
End: 2020-09-08
Attending: INTERNAL MEDICINE
Payer: COMMERCIAL

## 2020-09-08 DIAGNOSIS — R94.5 ABNORMAL RESULTS OF LIVER FUNCTION STUDIES: Primary | ICD-10-CM

## 2020-09-08 ASSESSMENT — PAIN SCALES - GENERAL: PAINLEVEL: NO PAIN (0)

## 2020-09-08 NOTE — PROGRESS NOTES
"Emir Angela is a 35 year old male who is being evaluated via a billable video visit.      The patient has been notified of following:     \"This video visit will be conducted via a call between you and your physician/provider. We have found that certain health care needs can be provided without the need for an in-person physical exam.  This service lets us provide the care you need with a video conversation.  If a prescription is necessary we can send it directly to your pharmacy.  If lab work is needed we can place an order for that and you can then stop by our lab to have the test done at a later time.    Video visits are billed at different rates depending on your insurance coverage.  Please reach out to your insurance provider with any questions.    If during the course of the call the physician/provider feels a video visit is not appropriate, you will not be charged for this service.\"    Patient has given verbal consent for Video visit? Yes  How would you like to obtain your AVS? MyChart  If you are dropped from the video visit, the video invite should be resent to: Text to cell phone: [phone]  Will anyone else be joining your video visit? No        Video-Visit Details    Type of service:  Video Visit    Video Start Time: 0913  Video End Time: 0930    Originating Location (pt. Location): Home    Distant Location (provider location):  [facility] HEPATOLOG[facility]     Platform used for Video Visit: [employer]  _________________________________________________    [facility]    Hepatology follow-up    Follow-up visit for abnormal liver function tests    Subjective:  35 year old male    Last visit July 2020.  F-actin antibody was positive and MILAGRO was weakly positive on initial testing.  Liver function tests initially improved but then plateaued.  Liver biopsy was performed 9/1/2020 which showed resolving injury.  No evidence of autoimmune hepatitis or hepatic fibrosis.    Patient is well " today.  He is feeling much stronger and has more energy since last visit.  He denies any symptoms specific to liver disease.    Patient denies jaundice, lower extremity edema, abdominal distension, lethargy or confusion.    Patient denies melena, hematemesis or hematochezia.    Patient denies fevers, sweats or chills.  No cough or dyspnea.    Weight stable.  Appetite is good.    Patient has not drank alcohol since last visit.  He is wondering if he can try NA drinks (yes).  He returned to work 2-3 weeks ago.        Medical hx Surgical hx   Past Medical History:   Diagnosis Date     Chronic nonallergic rhinitis 5/27/10 skin tests    all negative     Gastroesophageal reflux disease without esophagitis 10/6/2015     GERD (gastroesophageal reflux disease)       Past Surgical History:   Procedure Laterality Date     APPENDECTOMY  2004     ESOPHAGOSCOPY, GASTROSCOPY, DUODENOSCOPY (EGD), COMBINED N/A 11/9/2018    Procedure: COMBINED ESOPHAGOSCOPY, GASTROSCOPY, DUODENOSCOPY (EGD), BIOPSY SINGLE OR MULTIPLE;  Surgeon: Remy Vigil MD;  Location:  GI     IR LIVER BIOPSY PERCUTANEOUS  9/1/2020     IRRIGATION AND DEBRIDEMENT HAND, COMBINED Right 5/20/2020    Procedure: Irrigation and debridement of the right long finger and its flexor sheath;  Surgeon: Jeannine Escobar MD;  Location: [facility] OR     PERCUTANEOUS BIOPSY LIVER Right 9/1/2020    Procedure: NEEDLE BIOPSY, LIVER, PERCUTANEOUS;  Surgeon: Shelly Meyer MD;  [facility]     SINUS SURGERY  4/27/10          Medications  Prior to Admission medications    Not on File       Allergies  Allergies   Allergen Reactions     Sulfa Drugs Hives     Amoxicillin Rash       Review of systems  A 10-point review of systems was negative    Examination  Gen- well, NAD, A+Ox3, normal color  Eye- EOMI, no scleral icterus  Skin- no rash or jaundice  Neuro- no asterixis  Psych- normal mood    Laboratory  Lab Results   Component Value Date     08/24/2020     POTASSIUM 4.1 08/24/2020    CHLORIDE 106 08/24/2020    CO2 25 08/24/2020    BUN 18 08/24/2020    CR 1.09 08/24/2020       Lab Results   Component Value Date    BILITOTAL 1.1 08/31/2020     08/31/2020    AST 96 08/31/2020    ALKPHOS 67 08/31/2020       Lab Results   Component Value Date    ALBUMIN 4.3 08/31/2020    PROTTOTAL 7.6 08/31/2020        Lab Results   Component Value Date    WBC 4.3 08/24/2020    HGB 14.5 08/24/2020    MCV 90 08/24/2020     08/24/2020       Lab Results   Component Value Date    INR 1.25 08/24/2020     Liver biopsy Sep 2020 reviewed    Radiology  Nil new    Assessment  35 year old male who presents for follow-up of abnormal liver function tests most likely secondary to resolving medication-related injury.  No evidence of autoimmune hepatitis or chronic liver disease on recent liver biopsy.  No additional investigations required at this time.  Will monitor clinically for now.    Plan  1.  OK to have NA drinks  2.  Check LFTs in 3 months  3.  Follow-up in 6 months    Servando Lopez MD  Hepatology  [facility]

## 2020-09-08 NOTE — LETTER
"    9/8/2020         RE: Emir Angela  [address] [place] [address]        Dear Colleague,    Thank you for referring your patient, Emir Angela, to the [facility]PATOLOG[facility]. Please see a copy of my visit note below.    Emir Angela is a 35 year old male who is being evaluated via a billable video visit.      The patient has been notified of following:     \"This video visit will be conducted via a call between you and your physician/provider. We have found that certain health care needs can be provided without the need for an in-person physical exam.  This service lets us provide the care you need with a video conversation.  If a prescription is necessary we can send it directly to your pharmacy.  If lab work is needed we can place an order for that and you can then stop by our lab to have the test done at a later time.    Video visits are billed at different rates depending on your insurance coverage.  Please reach out to your insurance provider with any questions.    If during the course of the call the physician/provider feels a video visit is not appropriate, you will not be charged for this service.\"    Patient has given verbal consent for Video visit? Yes  How would you like to obtain your AVS? MyChart  If you are dropped from the video visit, the video invite should be resent to: Text to cell phone: [phone]  Will anyone else be joining your video visit? No        Video-Visit Details    Type of service:  Video Visit    Video Start Time: 0913  Video End Time: 0930    Originating Location (pt. Location): Home    Distant Location (provider location):  [facility]PAT[facility]     Platform used for Video Visit: [employer]  _________________________________________________    [facility]    Hepatology follow-up    Follow-up visit for abnormal liver function tests    Subjective:  35 year old male    Last visit July 2020.  F-actin antibody was positive and MILAGRO was " weakly positive on initial testing.  Liver function tests initially improved but then plateaued.  Liver biopsy was performed 9/1/2020 which showed resolving injury.  No evidence of autoimmune hepatitis or hepatic fibrosis.    Patient is well today.  He is feeling much stronger and has more energy since last visit.  He denies any symptoms specific to liver disease.    Patient denies jaundice, lower extremity edema, abdominal distension, lethargy or confusion.    Patient denies melena, hematemesis or hematochezia.    Patient denies fevers, sweats or chills.  No cough or dyspnea.    Weight stable.  Appetite is good.    Patient has not drank alcohol since last visit.  He is wondering if he can try NA drinks (yes).  He returned to work 2-3 weeks ago.        Medical hx Surgical hx   Past Medical History:   Diagnosis Date     Chronic nonallergic rhinitis 5/27/10 skin tests    all negative     Gastroesophageal reflux disease without esophagitis 10/6/2015     GERD (gastroesophageal reflux disease)       Past Surgical History:   Procedure Laterality Date     APPENDECTOMY  2004     ESOPHAGOSCOPY, GASTROSCOPY, DUODENOSCOPY (EGD), COMBINED N/A 11/9/2018    Procedure: COMBINED ESOPHAGOSCOPY, GASTROSCOPY, DUODENOSCOPY (EGD), BIOPSY SINGLE OR MULTIPLE;  Surgeon: Remy Vigil MD;  Location:  GI     IR LIVER BIOPSY PERCUTANEOUS  9/1/2020     IRRIGATION AND DEBRIDEMENT HAND, COMBINED Right 5/20/2020    Procedure: Irrigation and debridement of the right long finger and its flexor sheath;  Surgeon: Jeannine Escobar MD;  Location: [facility] OR     PERCUTANEOUS BIOPSY LIVER Right 9/1/2020    Procedure: NEEDLE BIOPSY, LIVER, PERCUTANEOUS;  Surgeon: Shelly Meyer MD;  [facility]     SINUS SURGERY  4/27/10          Medications  Prior to Admission medications    Not on File       Allergies  Allergies   Allergen Reactions     Sulfa Drugs Hives     Amoxicillin Rash       Review of systems  A 10-point review of systems  was negative    Examination  Gen- well, NAD, A+Ox3, normal color  Eye- EOMI, no scleral icterus  Skin- no rash or jaundice  Neuro- no asterixis  Psych- normal mood    Laboratory  Lab Results   Component Value Date     08/24/2020    POTASSIUM 4.1 08/24/2020    CHLORIDE 106 08/24/2020    CO2 25 08/24/2020    BUN 18 08/24/2020    CR 1.09 08/24/2020       Lab Results   Component Value Date    BILITOTAL 1.1 08/31/2020     08/31/2020    AST 96 08/31/2020    ALKPHOS 67 08/31/2020       Lab Results   Component Value Date    ALBUMIN 4.3 08/31/2020    PROTTOTAL 7.6 08/31/2020        Lab Results   Component Value Date    WBC 4.3 08/24/2020    HGB 14.5 08/24/2020    MCV 90 08/24/2020     08/24/2020       Lab Results   Component Value Date    INR 1.25 08/24/2020     Liver biopsy Sep 2020 reviewed    Radiology  Nil new    Assessment  35 year old male who presents for follow-up of abnormal liver function tests most likely secondary to resolving medication-related injury.  No evidence of autoimmune hepatitis or chronic liver disease on recent liver biopsy.  No additional investigations required at this time.  Will monitor clinically for now.    Plan  1.  OK to have NA drinks  2.  Check LFTs in 3 months  3.  Follow-up in 6 months    Servando Lopez MD  Hepatology  [facility]

## 2020-09-08 NOTE — PATIENT INSTRUCTIONS
Plan  1.  OK to have NA drinks  2.  Check blood work (liver tests) in 3 months  3.  Follow-up in 6 months    Servando Lopez MD  Hepatology  [facility]

## 2020-09-09 NOTE — H&P
"Interventional Radiology Pre-Procedure H&P Assessment     Reason for procedure: Elevated liver enzymes    History: Previously healthy, infected wound from [place] s/p IV antibiotics now with ongoing elevated LFTs so presents for liver biopsy.    Past Medical History:   Diagnosis Date     Chronic nonallergic rhinitis 5/27/10 skin tests    all negative     Gastroesophageal reflux disease without esophagitis 10/6/2015     GERD (gastroesophageal reflux disease)      Past Surgical History:   Procedure Laterality Date     APPENDECTOMY  2004     ESOPHAGOSCOPY, GASTROSCOPY, DUODENOSCOPY (EGD), COMBINED N/A 11/9/2018    Procedure: COMBINED ESOPHAGOSCOPY, GASTROSCOPY, DUODENOSCOPY (EGD), BIOPSY SINGLE OR MULTIPLE;  Surgeon: Remy Vigil MD;  Location: [facility] GI     IR LIVER BIOPSY PERCUTANEOUS  9/1/2020     IRRIGATION AND DEBRIDEMENT HAND, COMBINED Right 5/20/2020    Procedure: Irrigation and debridement of the right long finger and its flexor sheath;  Surgeon: Jeannine Escobar MD;  Location: [facility] OR     PERCUTANEOUS BIOPSY LIVER Right 9/1/2020    Procedure: NEEDLE BIOPSY, LIVER, PERCUTANEOUS;  Surgeon: Shelly Meyer MD;  [facility]     SINUS SURGERY  4/27/10     No current outpatient medications on file.  Allergies   Allergen Reactions     Sulfa Drugs Hives     Amoxicillin Rash     Exam:  /65   Pulse 65   Temp 97.5  F (36.4  C) (Temporal)   Resp 16   Ht 1.778 m (5' 10\")   Wt 70.3 kg (155 lb)   SpO2 98%   BMI 22.24 kg/m    Mallampati: Grade 1:  Soft palate, uvula, tonsillar pillars, and posterior pharyngeal wall visible  Lungs: Lungs Clear with good breath sounds bilaterally  Heart: Normal heart sounds and rate    Franck Martel PA-C  Interventional Radiology  [phone]      "

## 2020-09-17 ENCOUNTER — OFFICE VISIT (OUTPATIENT)
Dept: FAMILY MEDICINE | Facility: CLINIC | Age: 35
End: 2020-09-17
Payer: COMMERCIAL

## 2020-09-17 VITALS — SYSTOLIC BLOOD PRESSURE: 118 MMHG | DIASTOLIC BLOOD PRESSURE: 72 MMHG

## 2020-09-17 DIAGNOSIS — L25.9 CONTACT DERMATITIS, UNSPECIFIED CONTACT DERMATITIS TYPE, UNSPECIFIED TRIGGER: ICD-10-CM

## 2020-09-17 DIAGNOSIS — L29.9 LOCALIZED PRURITUS: ICD-10-CM

## 2020-09-17 DIAGNOSIS — L71.0 PERIORAL DERMATITIS: Primary | ICD-10-CM

## 2020-09-17 PROCEDURE — 99214 OFFICE O/P EST MOD 30 MIN: CPT | Performed by: PHYSICIAN ASSISTANT

## 2020-09-17 RX ORDER — PIMECROLIMUS 10 MG/G
CREAM TOPICAL 2 TIMES DAILY
Qty: 60 G | Refills: 1 | Status: SHIPPED | OUTPATIENT
Start: 2020-09-17 | End: 2022-03-27

## 2020-09-17 RX ORDER — METRONIDAZOLE 7.5 MG/G
LOTION TOPICAL
Qty: 60 ML | Refills: 1 | Status: SHIPPED | OUTPATIENT
Start: 2020-09-17 | End: 2022-03-27

## 2020-09-17 NOTE — PROGRESS NOTES
HPI:  Emir Angela is a 35 year old male patient here today for rash on forehead .  Patient states this has been present for a short while. Also present at the end of 2019 winter. States flared after wearing a hat for 6-8 hours and sweating. Resolved in the summer. Has tried TAC1%, desonide and HC with no resolution. States aquaphor seems to really help.  Patient reports the following symptoms: itch and rash. Also has a rash on right perinasal .  Patient reports the following previous treatments: a while. Pimples and dry Patient reports the following modifying factors: none.  Associated symptoms: none.  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 Would prefer to avoid orals if he can.Patient has no other skin complaints today.  Remainder of the HPI, Meds, PMH, Allergies, FH, and SH was reviewed in chart.      Past Medical History:   Diagnosis Date     Chronic nonallergic rhinitis 5/27/10 skin tests    all negative     Gastroesophageal reflux disease without esophagitis 10/6/2015     GERD (gastroesophageal reflux disease)        Past Surgical History:   Procedure Laterality Date     APPENDECTOMY  2004     ESOPHAGOSCOPY, GASTROSCOPY, DUODENOSCOPY (EGD), COMBINED N/A 11/9/2018    Procedure: COMBINED ESOPHAGOSCOPY, GASTROSCOPY, DUODENOSCOPY (EGD), BIOPSY SINGLE OR MULTIPLE;  Surgeon: Remy Vigil MD;  Location:  GI     IR LIVER BIOPSY PERCUTANEOUS  9/1/2020     IRRIGATION AND DEBRIDEMENT HAND, COMBINED Right 5/20/2020    Procedure: Irrigation and debridement of the right long finger and its flexor sheath;  Surgeon: Jeannine Escobar MD;  Location: [facility] OR     PERCUTANEOUS BIOPSY LIVER Right 9/1/2020    Procedure: NEEDLE BIOPSY, LIVER, PERCUTANEOUS;  Surgeon: Shelly Meyer MD;  [facility]     SINUS SURGERY  4/27/10        Family History   Problem Relation Age of Onset     Hypertension Father      Cardiovascular Maternal Grandfather      Heart Disease Maternal Grandfather       Allergies Brother        Social History     Socioeconomic History     Marital status:      Spouse name: Not on file     Number of children: Not on file     Years of education: Not on file     Highest education level: Not on file   Occupational History     Not on file   Social Needs     Financial resource strain: Not on file     Food insecurity     Worry: Not on file     Inability: Not on file     Transportation needs     Medical: Not on file     Non-medical: Not on file   Tobacco Use     Smoking status: Never Smoker     Smokeless tobacco: Never Used   Substance and Sexual Activity     Alcohol use: Yes     Alcohol/week: 0.0 standard drinks     Comment: 0-6 per week      Drug use: No     Sexual activity: Yes     Partners: Female     Birth control/protection: Condom   Lifestyle     Physical activity     Days per week: Not on file     Minutes per session: Not on file     Stress: Not on file   Relationships     Social connections     Talks on phone: Not on file     Gets together: Not on file     Attends Latter day service: Not on file     Active member of club or organization: Not on file     Attends meetings of clubs or organizations: Not on file     Relationship status: Not on file     Intimate partner violence     Fear of current or ex partner: Not on file     Emotionally abused: Not on file     Physically abused: Not on file     Forced sexual activity: Not on file   Other Topics Concern     Parent/sibling w/ CABG, MI or angioplasty before 65F 55M? 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     No facility-administered encounter medications on file as of 9/17/2020.        Review Of Systems:  Skin: rash  Eyes: negative  Ears/Nose/Throat: negative  Respiratory: No shortness of breath, dyspnea on exertion, cough, or hemoptysis  Cardiovascular: negative  Gastrointestinal:  negative  Genitourinary: negative  Musculoskeletal: negative  Neurologic: negative  Psychiatric: negative  Hematologic/Lymphatic/Immunologic: negative  Endocrine: negative      Objective:     /72   Eyes: Conjunctivae/lids: Normal   ENT: Lips:  Normal  MSK: Normal  Cardiovascular: Peripheral edema none  Pulm: Breathing Normal  Neuro/Psych: Orientation: A/O x 3 Normal; Mood/Affect: Normal, NAD, WDWN  Pt accompanied by: self  Following areas examined: face, neck, ears   Pond skin type:ii   Findings:  Pink scaly papules across mid forehead horizontally  Red papules with surrounding pink scaly patch on right perinasal  Assessment and Plan:  1) perioral dermatitis  Begin elidel 2x a day   Begin metronidazole 1-2x a day    2) irritant contact dermatitis of forehead, localized pruritis  Keep area dry  Continue to moisturize regularly  Apply elidel 2x a day  Consider using metronidazole 1x a day   Avoid irritant      Follow up in 3-4 weeks

## 2020-09-17 NOTE — LETTER
9/17/2020         RE: Emir Angela  [address] [place] [address]        Dear Colleague,    Thank you for referring your patient, Emir Angela, to the [facility] Please see a copy of my visit note below.    HPI:  Emir Angela is a 35 year old male patient here today for rash on forehead .  Patient states this has been present for a short while. Also present at the end of 2019 winter. States flared after wearing a hat for 6-8 hours and sweating. Resolved in the summer. Has tried TAC1%, desonide and HC with no resolution. States aquaphor seems to really help.  Patient reports the following symptoms: itch and rash. Also has a rash on right perinasal .  Patient reports the following previous treatments: a while. Pimples and dry Patient reports the following modifying factors: none.  Associated symptoms: none.  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 Would prefer to avoid orals if he can.Patient has no other skin complaints today.  Remainder of the HPI, Meds, PMH, Allergies, FH, and SH was reviewed in chart.      Past Medical History:   Diagnosis Date     Chronic nonallergic rhinitis 5/27/10 skin tests    all negative     Gastroesophageal reflux disease without esophagitis 10/6/2015     GERD (gastroesophageal reflux disease)        Past Surgical History:   Procedure Laterality Date     APPENDECTOMY  2004     ESOPHAGOSCOPY, GASTROSCOPY, DUODENOSCOPY (EGD), COMBINED N/A 11/9/2018    Procedure: COMBINED ESOPHAGOSCOPY, GASTROSCOPY, DUODENOSCOPY (EGD), BIOPSY SINGLE OR MULTIPLE;  Surgeon: Remy Vigil MD;  Location:  GI     IR LIVER BIOPSY PERCUTANEOUS  9/1/2020     IRRIGATION AND DEBRIDEMENT HAND, COMBINED Right 5/20/2020    Procedure: Irrigation and debridement of the right long finger and its flexor sheath;  Surgeon: Jeannine Escobar MD;  Location: [facility] OR     PERCUTANEOUS BIOPSY LIVER Right 9/1/2020    Procedure: NEEDLE BIOPSY,  LIVER, PERCUTANEOUS;  Surgeon: Shelly Meyer MD;  [facility]     SINUS SURGERY  4/27/10        Family History   Problem Relation Age of Onset     Hypertension Father      Cardiovascular Maternal Grandfather      Heart Disease Maternal Grandfather      Allergies Brother        Social History     Socioeconomic History     Marital status:      Spouse name: Not on file     Number of children: Not on file     Years of education: Not on file     Highest education level: Not on file   Occupational History     Not on file   Social Needs     Financial resource strain: Not on file     Food insecurity     Worry: Not on file     Inability: Not on file     Transportation needs     Medical: Not on file     Non-medical: Not on file   Tobacco Use     Smoking status: Never Smoker     Smokeless tobacco: Never Used   Substance and Sexual Activity     Alcohol use: Yes     Alcohol/week: 0.0 standard drinks     Comment: 0-6 per week      Drug use: No     Sexual activity: Yes     Partners: Female     Birth control/protection: Condom   Lifestyle     Physical activity     Days per week: Not on file     Minutes per session: Not on file     Stress: Not on file   Relationships     Social connections     Talks on phone: Not on file     Gets together: Not on file     Attends Voodoo service: Not on file     Active member of club or organization: Not on file     Attends meetings of clubs or organizations: Not on file     Relationship status: Not on file     Intimate partner violence     Fear of current or ex partner: Not on file     Emotionally abused: Not on file     Physically abused: Not on file     Forced sexual activity: Not on file   Other Topics Concern     Parent/sibling w/ CABG, MI or angioplasty before 65F 55M? 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     No facility-administered encounter medications  on file as of 9/17/2020.        Review Of Systems:  Skin: rash  Eyes: negative  Ears/Nose/Throat: negative  Respiratory: No shortness of breath, dyspnea on exertion, cough, or hemoptysis  Cardiovascular: negative  Gastrointestinal: negative  Genitourinary: negative  Musculoskeletal: negative  Neurologic: negative  Psychiatric: negative  Hematologic/Lymphatic/Immunologic: negative  Endocrine: negative      Objective:     /72   Eyes: Conjunctivae/lids: Normal   ENT: Lips:  Normal  MSK: Normal  Cardiovascular: Peripheral edema none  Pulm: Breathing Normal  Neuro/Psych: Orientation: A/O x 3 Normal; Mood/Affect: Normal, NAD, WDWN  Pt accompanied by: self  Following areas examined: face, neck, ears   Pond skin type:ii   Findings:  Pink scaly papules across mid forehead horizontally  Red papules with surrounding pink scaly patch on right perinasal  Assessment and Plan:  1) perioral dermatitis  Begin elidel 2x a day   Begin metronidazole 1-2x a day    2) irritant contact dermatitis of forehead, localized pruritis  Keep area dry  Continue to moisturize regularly  Apply elidel 2x a day  Consider using metronidazole 1x a day   Avoid irritant      Follow up in 3-4 weeks      Again, thank you for allowing me to participate in the care of your patient.        Sincerely,        Keira Fernandez PA-C

## 2020-09-17 NOTE — PATIENT INSTRUCTIONS
Proper skin care from [facility]:    -Eliminate harsh soaps as they strip the natural oils from the skin, often resulting in dry itchy skin ( i.e. Dial, Zest, Elif Spring)  -Use mild soaps such as Cetaphil or Dove Sensitive Skin in the shower. You do not need to use soap on arms, legs, and trunk every time you shower unless visibly soiled.   -Avoid hot or cold showers.  -After showering, lightly dry off and apply moisturizing within 2-3 minutes. This will help trap moisture in the skin.   -Aggressive use of a moisturizer at least 1-2 times a day to the entire body (including -Vanicream, Cetaphil, Aquaphor or Cerave) and moisturize hands after every washing.  -We recommend using moisturizers that come in a tub that needs to be scooped out, not a pump. This has more of an oil base. It will hold moisture in your skin much better than a water base moisturizer. The above recommended are non-pore clogging.      Wear a sunscreen with at least SPF 30 on your face, ears, neck and V of the chest daily. Wear sunscreen on other areas of the body if those areas are exposed to the sun throughout the day. Sunscreens can contain physical and/or chemical blockers. Physical blockers are less likely to clog pores, these include zinc oxide and titanium dioxide. Reapply every two hour and after swimming. Sunscreen examples include Neutrogena, CeraVe, Blue Lizard, Elta MD and many others.    UV radiation  UVA radiation remains constant throughout the day and throughout the year. It is a longer wavelength than UVB and therefore penetrates deeper into the skin leading to immediate and delayed tanning, photoaging, and skin cancer. 70-80% of UVA and UVB radiation occurs between the hours of 10am-2pm.  UVB radiation  UVB radiation causes the most harmful effects and is more significant during the summer months. However, snow and ice can reflect UVB radiation leading to skin damage during the winter months as well. UVB radiation is  responsible for tanning, burning, inflammation, delayed erythema (pinkness), pigmentation (brown spots), and skin cancer.     I recommend self monthly full body exams and yearly full body exams with a dermatology provider. If you develop a new or changing lesion please follow up for examination. Most skin cancers are pink and scaly or pink and pearly. However, we do see blue/brown/black skin cancers.  Consider the ABCDEs of melanoma when giving yourself your monthly full body exam ( don't forget the groin, buttocks, feet, toes, etc). A-asymmetry, B-borders, C-color, D-diameter, E-elevation or evolving. If you see any of these changes please follow up in clinic. If you cannot see your back I recommend purchasing a hand held mirror to use with a larger wall mirror.          Please call with prescription names.

## 2020-12-01 DIAGNOSIS — R94.5 ABNORMAL RESULTS OF LIVER FUNCTION STUDIES: ICD-10-CM

## 2020-12-01 PROCEDURE — 80076 HEPATIC FUNCTION PANEL: CPT | Performed by: INTERNAL MEDICINE

## 2020-12-02 LAB
ALBUMIN SERPL-MCNC: 4.5 G/DL (ref 3.4–5)
ALP SERPL-CCNC: 58 U/L (ref 40–150)
ALT SERPL W P-5'-P-CCNC: 99 U/L (ref 0–70)
AST SERPL W P-5'-P-CCNC: 47 U/L (ref 0–45)
BILIRUB DIRECT SERPL-MCNC: 0.3 MG/DL (ref 0–0.2)
BILIRUB SERPL-MCNC: 1.3 MG/DL (ref 0.2–1.3)
PROT SERPL-MCNC: 7.7 G/DL (ref 6.8–8.8)

## 2020-12-13 ENCOUNTER — HEALTH MAINTENANCE LETTER (OUTPATIENT)
Age: 35
End: 2020-12-13

## 2021-03-01 DIAGNOSIS — R94.5 ABNORMAL RESULTS OF LIVER FUNCTION STUDIES: ICD-10-CM

## 2021-03-01 LAB
ALBUMIN SERPL-MCNC: 4.3 G/DL (ref 3.4–5)
ALP SERPL-CCNC: 54 U/L (ref 40–150)
ALT SERPL W P-5'-P-CCNC: 79 U/L (ref 0–70)
ANION GAP SERPL CALCULATED.3IONS-SCNC: 2 MMOL/L (ref 3–14)
AST SERPL W P-5'-P-CCNC: 38 U/L (ref 0–45)
BILIRUB DIRECT SERPL-MCNC: 0.2 MG/DL (ref 0–0.2)
BILIRUB SERPL-MCNC: 0.8 MG/DL (ref 0.2–1.3)
BUN SERPL-MCNC: 18 MG/DL (ref 7–30)
CALCIUM SERPL-MCNC: 9.5 MG/DL (ref 8.5–10.1)
CHLORIDE SERPL-SCNC: 105 MMOL/L (ref 94–109)
CO2 SERPL-SCNC: 30 MMOL/L (ref 20–32)
CREAT SERPL-MCNC: 1.01 MG/DL (ref 0.66–1.25)
ERYTHROCYTE [DISTWIDTH] IN BLOOD BY AUTOMATED COUNT: 12.7 % (ref 10–15)
GFR SERPL CREATININE-BSD FRML MDRD: >90 ML/MIN/{1.73_M2}
GLUCOSE SERPL-MCNC: 73 MG/DL (ref 70–99)
HCT VFR BLD AUTO: 45 % (ref 40–53)
HGB BLD-MCNC: 15.1 G/DL (ref 13.3–17.7)
INR PPP: 1.08 (ref 0.86–1.14)
MCH RBC QN AUTO: 29.8 PG (ref 26.5–33)
MCHC RBC AUTO-ENTMCNC: 33.6 G/DL (ref 31.5–36.5)
MCV RBC AUTO: 89 FL (ref 78–100)
PLATELET # BLD AUTO: 246 10E9/L (ref 150–450)
POTASSIUM SERPL-SCNC: 3.8 MMOL/L (ref 3.4–5.3)
PROT SERPL-MCNC: 7.5 G/DL (ref 6.8–8.8)
RBC # BLD AUTO: 5.06 10E12/L (ref 4.4–5.9)
SODIUM SERPL-SCNC: 137 MMOL/L (ref 133–144)
WBC # BLD AUTO: 5.1 10E9/L (ref 4–11)

## 2021-03-01 PROCEDURE — 85027 COMPLETE CBC AUTOMATED: CPT | Performed by: INTERNAL MEDICINE

## 2021-03-01 PROCEDURE — 80048 BASIC METABOLIC PNL TOTAL CA: CPT | Performed by: INTERNAL MEDICINE

## 2021-03-01 PROCEDURE — 85610 PROTHROMBIN TIME: CPT | Performed by: INTERNAL MEDICINE

## 2021-03-01 PROCEDURE — 80076 HEPATIC FUNCTION PANEL: CPT | Performed by: INTERNAL MEDICINE

## 2021-03-01 PROCEDURE — 36415 COLL VENOUS BLD VENIPUNCTURE: CPT | Performed by: INTERNAL MEDICINE

## 2021-03-03 ENCOUNTER — VIRTUAL VISIT (OUTPATIENT)
Dept: GASTROENTEROLOGY | Facility: CLINIC | Age: 36
End: 2021-03-03
Attending: INTERNAL MEDICINE
Payer: COMMERCIAL

## 2021-03-03 DIAGNOSIS — R94.5 ABNORMAL RESULTS OF LIVER FUNCTION STUDIES: Primary | ICD-10-CM

## 2021-03-03 PROCEDURE — 99214 OFFICE O/P EST MOD 30 MIN: CPT | Mod: 95 | Performed by: INTERNAL MEDICINE

## 2021-03-03 ASSESSMENT — PAIN SCALES - GENERAL: PAINLEVEL: NO PAIN (0)

## 2021-03-03 NOTE — PROGRESS NOTES
Fadi is a 36 year old who is being evaluated via a billable video visit.      How would you like to obtain your AVS? MyChart  If the video visit is dropped, the invitation should be resent by: Send to e-mail at: [email or web address]  Will anyone else be joining your video visit? No      Video Start Time: 0935    Video-Visit Details    Type of service:  Video Visit    Video End Time:0953    Originating Location (pt. Location): Home    Distant Location (provider location):  [facility] [place]     Platform used for Video Visit: [facility]   _______________________________________________________________________    [facility]    Hepatology follow-up    Follow-up visit for abnormal liver function tests    Subjective:  36 year old male    Last visit Sep 2020.  No new medications, ER visits or hospital admissions.  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      Patient is well today.  He has no symptoms related to liver disease.    Patient denies jaundice, lower extremity edema, abdominal distension, lethargy or confusion.    Patient denies melena, hematemesis or hematochezia.    Patient denies fevers, sweats or chills.  No cough or dyspnea.    Weight stable.  Appetite is good.    Patient drinks 1 alcoholic drink per week.      Medical hx Surgical hx   Past Medical History:   Diagnosis Date     Chronic nonallergic rhinitis 5/27/10 skin tests    all negative     Gastroesophageal reflux disease without esophagitis 10/6/2015     GERD (gastroesophageal reflux disease)       Past Surgical History:   Procedure Laterality Date     APPENDECTOMY  2004     ESOPHAGOSCOPY, GASTROSCOPY, DUODENOSCOPY (EGD), COMBINED N/A 11/9/2018    Procedure: COMBINED ESOPHAGOSCOPY, GASTROSCOPY, DUODENOSCOPY (EGD), BIOPSY SINGLE OR MULTIPLE;  Surgeon: Remy Vigil MD;  Location:  GI     IR LIVER BIOPSY PERCUTANEOUS  9/1/2020     IRRIGATION  AND DEBRIDEMENT HAND, COMBINED Right 5/20/2020    Procedure: Irrigation and debridement of the right long finger and its flexor sheath;  Surgeon: Jeannine Escobar MD;  Location: [facility] OR     PERCUTANEOUS BIOPSY LIVER Right 9/1/2020    Procedure: NEEDLE BIOPSY, LIVER, PERCUTANEOUS;  Surgeon: Shelly Meyer MD;  Location: [facility] OR     SINUS SURGERY  4/27/10          Medications  Prior to Admission medications    Medication Sig Start Date End Date Taking? Authorizing Provider   metroNIDAZOLE (METROLOTION) 0.75 % external lotion Apply 1-2x a day to affected area on nose. 9/17/20  Yes Keira Fernandez PA-C   pimecrolimus (ELIDEL) 1 % external cream Apply topically 2 times daily To aa on forehead and nose. 9/17/20  Yes Keira Fernandez PA-C       Allergies  Allergies   Allergen Reactions     Sulfa Drugs Hives     Amoxicillin Rash       Review of systems  A 10-point review of systems was negative    Examination  Gen- well, NAD, A+Ox3, normal color  Eye- EOMI, no scleral icterus  Skin- no rash or jaundice  Psych- normal mood    Laboratory  Lab Results   Component Value Date     03/01/2021    POTASSIUM 3.8 03/01/2021    CHLORIDE 105 03/01/2021    CO2 30 03/01/2021    BUN 18 03/01/2021    CR 1.01 03/01/2021       Lab Results   Component Value Date    BILITOTAL 0.8 03/01/2021    ALT 79 03/01/2021    AST 38 03/01/2021    ALKPHOS 54 03/01/2021       Lab Results   Component Value Date    ALBUMIN 4.3 03/01/2021    PROTTOTAL 7.5 03/01/2021        Lab Results   Component Value Date    WBC 5.1 03/01/2021    HGB 15.1 03/01/2021    MCV 89 03/01/2021     03/01/2021       Lab Results   Component Value Date    INR 1.08 03/01/2021       Radiology  Nil recent    Assessment  36 year old male who presents for follow-up of abnormal liver function tests most likely secondary to 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  Liver function tests improved.  Will continue to monitor clinically and recheck blood work in 6 months- if  LFTs normal, no additional follow-up is required.    Plan  1.  Check LFTs in 6 months  2.  Follow-up TBD    Servando Lopez MD  Hepatology  [facility]

## 2021-03-03 NOTE — LETTER
3/3/2021         RE: Emir Angela  [address]  [place] [address]        Dear Colleague,    Thank you for referring your patient, Emir Angela, to the [facility]PAT[facility] [place]. Please see a copy of my visit note below.    Fadi is a 36 year old who is being evaluated via a billable video visit.      How would you like to obtain your AVS? MyChart  If the video visit is dropped, the invitation should be resent by: Send to e-mail at: [email or web address]  Will anyone else be joining your video visit? No      Video Start Time: 0935    Video-Visit Details    Type of service:  Video Visit    Video End Time:0953    Originating Location (pt. Location): Home    Distant Location (provider location):  [facility]PAT[facility]     Platform used for Video Visit: [employer]   _______________________________________________________________________    [facility]    Hepatology follow-up    Follow-up visit for abnormal liver function tests    Subjective:  36 year old male    Last visit Sep 2020.  No new medications, ER visits or hospital admissions.  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      Patient is well today.  He has no symptoms related to liver disease.    Patient denies jaundice, lower extremity edema, abdominal distension, lethargy or confusion.    Patient denies melena, hematemesis or hematochezia.    Patient denies fevers, sweats or chills.  No cough or dyspnea.    Weight stable.  Appetite is good.    Patient drinks 1 alcoholic drink per week.      Medical hx Surgical hx   Past Medical History:   Diagnosis Date     Chronic nonallergic rhinitis 5/27/10 skin tests    all negative     Gastroesophageal reflux disease without esophagitis 10/6/2015     GERD (gastroesophageal reflux disease)       Past Surgical History:   Procedure Laterality Date     APPENDECTOMY  2004      ESOPHAGOSCOPY, GASTROSCOPY, DUODENOSCOPY (EGD), COMBINED N/A 11/9/2018    Procedure: COMBINED ESOPHAGOSCOPY, GASTROSCOPY, DUODENOSCOPY (EGD), BIOPSY SINGLE OR MULTIPLE;  Surgeon: Remy Vigil MD;  Location: [facility] GI     IR LIVER BIOPSY PERCUTANEOUS  9/1/2020     IRRIGATION AND DEBRIDEMENT HAND, COMBINED Right 5/20/2020    Procedure: Irrigation and debridement of the right long finger and its flexor sheath;  Surgeon: Jeannine Escobar MD;  Location: [facility] OR     PERCUTANEOUS BIOPSY LIVER Right 9/1/2020    Procedure: NEEDLE BIOPSY, LIVER, PERCUTANEOUS;  Surgeon: Shelly Meyer MD;  [facility]     SINUS SURGERY  4/27/10          Medications  Prior to Admission medications    Medication Sig Start Date End Date Taking? Authorizing Provider   metroNIDAZOLE (METROLOTION) 0.75 % external lotion Apply 1-2x a day to affected area on nose. 9/17/20  Yes Keira Fernandez PA-C   pimecrolimus (ELIDEL) 1 % external cream Apply topically 2 times daily To aa on forehead and nose. 9/17/20  Yes Keira Fernandez PA-C       Allergies  Allergies   Allergen Reactions     Sulfa Drugs Hives     Amoxicillin Rash       Review of systems  A 10-point review of systems was negative    Examination  Gen- well, NAD, A+Ox3, normal color  Eye- EOMI, no scleral icterus  Skin- no rash or jaundice  Psych- normal mood    Laboratory  Lab Results   Component Value Date     03/01/2021    POTASSIUM 3.8 03/01/2021    CHLORIDE 105 03/01/2021    CO2 30 03/01/2021    BUN 18 03/01/2021    CR 1.01 03/01/2021       Lab Results   Component Value Date    BILITOTAL 0.8 03/01/2021    ALT 79 03/01/2021    AST 38 03/01/2021    ALKPHOS 54 03/01/2021       Lab Results   Component Value Date    ALBUMIN 4.3 03/01/2021    PROTTOTAL 7.5 03/01/2021        Lab Results   Component Value Date    WBC 5.1 03/01/2021    HGB 15.1 03/01/2021    MCV 89 03/01/2021     03/01/2021       Lab Results   Component Value Date    INR 1.08 03/01/2021        Radiology  Nil recent    Assessment  36 year old male who presents for follow-up of abnormal liver function tests most likely secondary to 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  Liver function tests improved.  Will continue to monitor clinically and recheck blood work in 6 months- if LFTs normal, no additional follow-up is required.    Plan  1.  Check LFTs in 6 months  2.  Follow-up TBKENNETH Lopez MD  Hepatology  [facility]

## 2021-04-17 ENCOUNTER — HEALTH MAINTENANCE LETTER (OUTPATIENT)
Age: 36
End: 2021-04-17

## 2021-06-09 NOTE — PROGRESS NOTES
Coronavirus (COVID-19) Notification    Your result for COVID-19 is Negative  Letter sent that will serve as a formal notice for your employer T(C): 36.1 (01-06-19 @ 21:05), Max: 36.6 (01-06-19 @ 11:13)  HR: 101 (01-07-19 @ 02:34) (60 - 101)  BP: 167/75 (01-07-19 @ 02:34) (154/61 - 222/120)  RR: 16 (01-07-19 @ 02:34) (16 - 24)  SpO2: 98% (01-07-19 @ 02:34) (95% - 100%)

## 2021-06-20 NOTE — LETTER
Letter by Nilsa Heredia RN at      Author: Nilsa Heredia RN Service: -- Author Type: --    Filed:  Encounter Date: 6/27/2020 Status: (Other)       6/27/2020        Emir Angela  [address] [address]    This letter provides a written record that you were tested for COVID-19 on 6/26/20.     Your result was negative. This means that we didnt find the virus that causes COVID-19 in your sample. A test may show negative when you do actually have the virus. This can happen when the virus is in the early stages of infection, before you feel illness symptoms.    If you have symptoms   Stay home and away from others (self-isolate) until you meet ALL of the guidelines below:    Youve had no fever--and no medicine that reduces fever--for 3 full days (72 hours). And ?    Your other symptoms have gotten better. For example, your cough or breathing has improved. And?    At least 10 days have passed since your symptoms started.    During this time:    Stay home. Dont go to work, school or anywhere else.     Stay in your own room, including for meals. Use your own bathroom if you can.    Stay away from others in your home. No hugging, kissing or shaking hands. No visitors.    Clean high touch surfaces often (doorknobs, counters, handles, etc.). Use a household cleaning spray or wipes. You can find a full list on the EPA website at www.epa.gov/pesticide-registration/list-n-disinfectants-use-against-sars-cov-2.    Cover your mouth and nose with a mask, tissue or washcloth to avoid spreading germs.    Wash your hands and face often with soap and water.    Going back to work  Check with your employer for any guidelines to follow for going back to work.    Employers: This document serves as formal notice that your employee tested negative for COVID-19, as of the testing date shown above.

## 2021-08-30 ENCOUNTER — LAB (OUTPATIENT)
Dept: LAB | Facility: CLINIC | Age: 36
End: 2021-08-30
Payer: COMMERCIAL

## 2021-08-30 DIAGNOSIS — R94.5 ABNORMAL RESULTS OF LIVER FUNCTION STUDIES: ICD-10-CM

## 2021-08-30 PROCEDURE — 36415 COLL VENOUS BLD VENIPUNCTURE: CPT

## 2021-08-30 PROCEDURE — 80076 HEPATIC FUNCTION PANEL: CPT

## 2021-08-31 LAB
ALBUMIN SERPL-MCNC: 4.2 G/DL (ref 3.4–5)
ALP SERPL-CCNC: 49 U/L (ref 40–150)
ALT SERPL W P-5'-P-CCNC: 34 U/L (ref 0–70)
AST SERPL W P-5'-P-CCNC: 30 U/L (ref 0–45)
BILIRUB DIRECT SERPL-MCNC: 0.3 MG/DL (ref 0–0.2)
BILIRUB SERPL-MCNC: 1.1 MG/DL (ref 0.2–1.3)
PROT SERPL-MCNC: 7.4 G/DL (ref 6.8–8.8)

## 2021-09-26 ENCOUNTER — HEALTH MAINTENANCE LETTER (OUTPATIENT)
Age: 36
End: 2021-09-26

## 2021-10-12 ENCOUNTER — IMMUNIZATION (OUTPATIENT)
Dept: NURSING | Facility: CLINIC | Age: 36
End: 2021-10-12
Payer: COMMERCIAL

## 2021-10-12 PROCEDURE — 91300 PR COVID VAC PFIZER DIL RECON 30 MCG/0.3 ML IM: CPT

## 2021-10-12 PROCEDURE — 0004A PR COVID VAC PFIZER DIL RECON 30 MCG/0.3 ML IM: CPT

## 2021-12-23 ENCOUNTER — TELEPHONE (OUTPATIENT)
Dept: FAMILY MEDICINE | Facility: CLINIC | Age: 36
End: 2021-12-23
Payer: COMMERCIAL

## 2021-12-23 NOTE — TELEPHONE ENCOUNTER
Patient calling about diarrhea and vomiting  First episode Sat 12/18/2021  Episode of diarrhea, sweats, and abd pain   Pulled over while driving  Did vomit too   Second episode Wed 12/22/2021    Last year had some liver issues  Afebrile   All over abd pain     Did have cold sweats with first episode and pain but second episode did not   Did have some greasy pizza prior to second episode - said he's usually sensitive to that   Recently had take out too - wife didn't eat the same thing     Had appendix removed 2005  Advised monitoring for now and if continues then appt  Scheduled appt just in case  Pt agrees with plan  Gela OLIVER RN

## 2022-03-27 ENCOUNTER — OFFICE VISIT (OUTPATIENT)
Dept: URGENT CARE | Facility: URGENT CARE | Age: 37
End: 2022-03-27
Payer: COMMERCIAL

## 2022-03-27 VITALS
DIASTOLIC BLOOD PRESSURE: 78 MMHG | WEIGHT: 153 LBS | HEART RATE: 76 BPM | BODY MASS INDEX: 21.95 KG/M2 | TEMPERATURE: 98.3 F | OXYGEN SATURATION: 100 % | SYSTOLIC BLOOD PRESSURE: 119 MMHG

## 2022-03-27 DIAGNOSIS — J20.9 ACUTE BRONCHITIS WITH SYMPTOMS > 10 DAYS: Primary | ICD-10-CM

## 2022-03-27 PROCEDURE — U0003 INFECTIOUS AGENT DETECTION BY NUCLEIC ACID (DNA OR RNA); SEVERE ACUTE RESPIRATORY SYNDROME CORONAVIRUS 2 (SARS-COV-2) (CORONAVIRUS DISEASE [COVID-19]), AMPLIFIED PROBE TECHNIQUE, MAKING USE OF HIGH THROUGHPUT TECHNOLOGIES AS DESCRIBED BY CMS-2020-01-R: HCPCS | Performed by: PHYSICIAN ASSISTANT

## 2022-03-27 PROCEDURE — 99213 OFFICE O/P EST LOW 20 MIN: CPT | Performed by: PHYSICIAN ASSISTANT

## 2022-03-27 PROCEDURE — U0005 INFEC AGEN DETEC AMPLI PROBE: HCPCS | Performed by: PHYSICIAN ASSISTANT

## 2022-03-27 RX ORDER — PSEUDOEPHEDRINE HCL 30 MG
60 TABLET ORAL EVERY 6 HOURS PRN
COMMUNITY
End: 2022-09-16

## 2022-03-27 RX ORDER — AZITHROMYCIN 250 MG/1
TABLET, FILM COATED ORAL
Qty: 6 TABLET | Refills: 0 | Status: SHIPPED | OUTPATIENT
Start: 2022-03-27 | End: 2022-09-16

## 2022-03-27 RX ORDER — BENZONATATE 200 MG/1
200 CAPSULE ORAL 3 TIMES DAILY PRN
Qty: 30 CAPSULE | Refills: 0 | Status: SHIPPED | OUTPATIENT
Start: 2022-03-27 | End: 2022-09-16

## 2022-03-27 RX ORDER — BENZONATATE 200 MG/1
200 CAPSULE ORAL 3 TIMES DAILY PRN
Qty: 30 CAPSULE | Refills: 0 | Status: SHIPPED | OUTPATIENT
Start: 2022-03-27 | End: 2022-03-27

## 2022-03-27 RX ORDER — AZITHROMYCIN 250 MG/1
TABLET, FILM COATED ORAL
Qty: 6 TABLET | Refills: 0 | Status: SHIPPED | OUTPATIENT
Start: 2022-03-27 | End: 2022-03-27

## 2022-03-27 ASSESSMENT — ENCOUNTER SYMPTOMS
SHORTNESS OF BREATH: 0
SINUS PAIN: 0
PALPITATIONS: 0
CHILLS: 0
GASTROINTESTINAL NEGATIVE: 1
WHEEZING: 0
CARDIOVASCULAR NEGATIVE: 1
SINUS PRESSURE: 0
SORE THROAT: 1
CHEST TIGHTNESS: 0
COUGH: 1
FATIGUE: 0
RHINORRHEA: 0
FEVER: 0

## 2022-03-27 NOTE — PROGRESS NOTES
Subjective   Fadi is a 37 year old who presents for the following health issues   HPI   Acute Illness  Acute illness concerns:   Onset/Duration: 1week or so  Symptoms:  Fever: no  Chills/Sweats: no  Headache (location?): no  Sinus Pressure: no  Conjunctivitis:  no  Ear Pain: no  Rhinorrhea: no  Congestion: YES  Sore Throat: Yes  Cough: YES-productive of tan/brown sputum, with mild chest congestion  Wheeze: no  Decreased Appetite: no  Nausea: no  Vomiting: no  Diarrhea: no  Dysuria/Freq.: no  Dysuria or Hematuria: no  Fatigue/Achiness: no  Sick/Strep Exposure: YES- at home. Son has pneumonia.  No asthma, non-smoker.  Therapies tried and outcome: rest, fluids, pseudofed, tylenol with minimal relief    Patient Active Problem List   Diagnosis     Chronic nonallergic rhinitis     GERD (gastroesophageal reflux disease)     CARDIOVASCULAR SCREENING; LDL GOAL LESS THAN 160     Current Outpatient Medications   Medication     pseudoePHEDrine (SUDAFED) 30 MG tablet     No current facility-administered medications for this visit.        Allergies   Allergen Reactions     Sulfa Drugs Hives     Amoxicillin Rash       Review of Systems   Constitutional: Negative for chills, fatigue and fever.   HENT: Positive for ear pain and sore throat. Negative for congestion, ear discharge, hearing loss, rhinorrhea, sinus pressure and sinus pain.    Respiratory: Positive for cough. Negative for chest tightness, shortness of breath and wheezing.    Cardiovascular: Negative.  Negative for chest pain, palpitations and peripheral edema.   Gastrointestinal: Negative.    All other systems reviewed and are negative.           Objective    /78 (BP Location: Right arm, Patient Position: Sitting, Cuff Size: Adult Regular)   Pulse 76   Temp 98.3  F (36.8  C) (Tympanic)   Wt 69.4 kg (153 lb)   SpO2 100%   BMI 21.95 kg/m    Body mass index is 21.95 kg/m .  Physical Exam  Vitals and nursing note reviewed.   Constitutional:       General: He is  not in acute distress.     Appearance: Normal appearance. He is normal weight. He is not ill-appearing.   HENT:      Head: Normocephalic and atraumatic.      Ears:      Comments: TMs are intact without any erythema or bulging bilaterally.  Airway is patent.     Nose: Nose normal.      Mouth/Throat:      Lips: Pink.      Mouth: Mucous membranes are moist.      Pharynx: Oropharynx is clear. Uvula midline. No pharyngeal swelling, oropharyngeal exudate, posterior oropharyngeal erythema or uvula swelling.      Tonsils: No tonsillar exudate or tonsillar abscesses.   Eyes:      General: No scleral icterus.     Conjunctiva/sclera: Conjunctivae normal.      Pupils: Pupils are equal, round, and reactive to light.   Neck:      Thyroid: No thyromegaly.   Cardiovascular:      Rate and Rhythm: Normal rate and regular rhythm.      Pulses: Normal pulses.      Heart sounds: Normal heart sounds, S1 normal and S2 normal. No murmur heard.    No friction rub. No gallop.   Pulmonary:      Effort: Pulmonary effort is normal. No tachypnea, accessory muscle usage, respiratory distress or retractions.      Breath sounds: Normal breath sounds and air entry. No stridor. No decreased breath sounds, wheezing, rhonchi or rales.   Musculoskeletal:      Cervical back: Normal range of motion and neck supple.   Lymphadenopathy:      Cervical: No cervical adenopathy.   Skin:     General: Skin is warm and dry.      Findings: No rash.   Neurological:      Mental Status: He is alert and oriented to person, place, and time.   Psychiatric:         Mood and Affect: Mood normal.         Behavior: Behavior normal.         Thought Content: Thought content normal.         Judgment: Judgment normal.          Assessment/Plan:  Acute bronchitis with symptoms > 10 days:  Will treat with zithromax X5days and tessalon perles as needed for cough.  Will also test for covid as well.  Recommend treatment with rest, fluids and chicken soup. Tylenol/ibuprofen prn  fever/pain.  Recheck in clinic if symptoms worsen or if symptoms do not improve.  To the ER if he develops hemoptysis, chest pain, fevers>102, worsening shortness of breath/wheezing.    -     Symptomatic; Yes; 3/21/2022 COVID-19 Virus (Coronavirus) by PCR Nose  -     azithromycin (ZITHROMAX) 250 MG tablet; 2 tablets the first day, then 1 tablet daily for the next 4 days  -     benzonatate (TESSALON) 200 MG capsule; Take 1 capsule (200 mg) by mouth 3 times daily as needed for cough        Xiomara See PANCHITO Chase

## 2022-03-28 LAB — SARS-COV-2 RNA RESP QL NAA+PROBE: NEGATIVE

## 2022-05-08 ENCOUNTER — HEALTH MAINTENANCE LETTER (OUTPATIENT)
Age: 37
End: 2022-05-08

## 2022-09-16 ENCOUNTER — ANCILLARY PROCEDURE (OUTPATIENT)
Dept: GENERAL RADIOLOGY | Facility: CLINIC | Age: 37
End: 2022-09-16
Attending: INTERNAL MEDICINE
Payer: COMMERCIAL

## 2022-09-16 ENCOUNTER — OFFICE VISIT (OUTPATIENT)
Dept: FAMILY MEDICINE | Facility: CLINIC | Age: 37
End: 2022-09-16

## 2022-09-16 VITALS
HEIGHT: 70 IN | BODY MASS INDEX: 21.82 KG/M2 | DIASTOLIC BLOOD PRESSURE: 84 MMHG | WEIGHT: 152.4 LBS | RESPIRATION RATE: 20 BRPM | HEART RATE: 72 BPM | SYSTOLIC BLOOD PRESSURE: 122 MMHG | TEMPERATURE: 98.1 F | OXYGEN SATURATION: 98 %

## 2022-09-16 DIAGNOSIS — R05.9 COUGH: ICD-10-CM

## 2022-09-16 DIAGNOSIS — R05.9 COUGH: Primary | ICD-10-CM

## 2022-09-16 DIAGNOSIS — R09.82 PND (POST-NASAL DRIP): ICD-10-CM

## 2022-09-16 PROCEDURE — 71046 X-RAY EXAM CHEST 2 VIEWS: CPT | Mod: TC | Performed by: RADIOLOGY

## 2022-09-16 PROCEDURE — 99214 OFFICE O/P EST MOD 30 MIN: CPT | Performed by: INTERNAL MEDICINE

## 2022-09-16 ASSESSMENT — PAIN SCALES - GENERAL: PAINLEVEL: NO PAIN (0)

## 2022-09-16 NOTE — PROGRESS NOTES
"  Assessment & Plan     Cough  Suspect PND.  Given LOS and lack of associated symptoms, infectious etiology seems less likely.    - XR Chest 2 Views    PND (post-nasal drip)  OTC Zyrtec and Flonase.        No follow-ups on file.    Hayder Reis MD  [facility] YOVANI Aponte is a 37 year old, presenting for the following health issues:  Cough (X 17 days)      HPI     Pt is new to me.  Seen elsewhere in system.  Generally quite healthy.  Works as an .  Was with [employer], now with [employer].   Planning a trip to [place] in a couple weeks.    Has been coughing for over 2 weeks.  The cough occurs when supine.    He has no associated pharyngitis, fever, sinus pressure or nasal congestion.      Has taken Delsym at night.  Helps but then has significant AM cough.      No personal h/o asthma  Does have nasal congestion and past nose surgery.        Review of Systems         Objective    /84 (BP Location: Right arm, Patient Position: Sitting, Cuff Size: Adult Regular)   Pulse 72   Temp 98.1  F (36.7  C)   Resp 20   Ht 1.778 m (5' 10\")   Wt 69.1 kg (152 lb 6.4 oz)   SpO2 98%   BMI 21.87 kg/m    Body mass index is 21.87 kg/m .  Physical Exam   GENERAL: healthy, alert and no distress  NECK: no adenopathy, no asymmetry, masses, or scars and thyroid normal to palpation  RESP: lungs clear to auscultation - no rales, rhonchi or wheezes  CV: regular rate and rhythm, normal S1 S2, no S3 or S4, no murmur, click or rub, no peripheral edema and peripheral pulses strong  MS: no gross musculoskeletal defects noted, no edema                    "

## 2022-09-16 NOTE — PATIENT INSTRUCTIONS
GET:    ZYRTEC (AKA CETIRIZINE) 10mg once a day    FLONASE (AKA FLUTICASONE) 2 sprays each nostril once a day every day

## 2022-10-27 ENCOUNTER — TRANSFERRED RECORDS (OUTPATIENT)
Dept: HEALTH INFORMATION MANAGEMENT | Facility: CLINIC | Age: 37
End: 2022-10-27

## 2022-11-02 ENCOUNTER — TRANSFERRED RECORDS (OUTPATIENT)
Dept: HEALTH INFORMATION MANAGEMENT | Facility: CLINIC | Age: 37
End: 2022-11-02

## 2022-11-08 ENCOUNTER — LAB (OUTPATIENT)
Dept: URGENT CARE | Facility: URGENT CARE | Age: 37
End: 2022-11-08
Attending: FAMILY MEDICINE
Payer: COMMERCIAL

## 2022-11-08 DIAGNOSIS — Z20.822 SUSPECTED 2019 NOVEL CORONAVIRUS INFECTION: ICD-10-CM

## 2022-11-08 PROCEDURE — U0005 INFEC AGEN DETEC AMPLI PROBE: HCPCS

## 2022-11-08 PROCEDURE — U0003 INFECTIOUS AGENT DETECTION BY NUCLEIC ACID (DNA OR RNA); SEVERE ACUTE RESPIRATORY SYNDROME CORONAVIRUS 2 (SARS-COV-2) (CORONAVIRUS DISEASE [COVID-19]), AMPLIFIED PROBE TECHNIQUE, MAKING USE OF HIGH THROUGHPUT TECHNOLOGIES AS DESCRIBED BY CMS-2020-01-R: HCPCS

## 2022-11-09 LAB — SARS-COV-2 RNA RESP QL NAA+PROBE: POSITIVE

## 2022-11-23 ENCOUNTER — VIRTUAL VISIT (OUTPATIENT)
Dept: FAMILY MEDICINE | Facility: CLINIC | Age: 37
End: 2022-11-23
Payer: COMMERCIAL

## 2022-11-23 DIAGNOSIS — U07.1 COVID-19 VIRUS INFECTION: Primary | ICD-10-CM

## 2022-11-23 PROCEDURE — 99213 OFFICE O/P EST LOW 20 MIN: CPT | Mod: 95 | Performed by: PHYSICIAN ASSISTANT

## 2022-11-23 RX ORDER — PSEUDOEPHEDRINE HCL 120 MG/1
120 TABLET, FILM COATED, EXTENDED RELEASE ORAL EVERY 12 HOURS
COMMUNITY
End: 2023-07-26

## 2022-11-23 RX ORDER — ACETAMINOPHEN 325 MG/1
325-650 TABLET ORAL EVERY 6 HOURS PRN
COMMUNITY
End: 2023-07-26

## 2022-11-23 NOTE — PROGRESS NOTES
Fadi is a 37 year old who is being evaluated via a billable video visit.      How would you like to obtain your AVS? Ran  If the video visit is dropped, the invitation should be resent by: Ran   Will anyone else be joining your video visit? No      Assessment and Plan:     (U07.1) COVID-19 virus infection  (primary encounter diagnosis)  Comment: onset 11/4, w/waxing and waning symptoms, no significant sob, no fever, sinus discussed symptomatic cares, doubt secondary pneumonia without fever, chest pain congestion and cough seemed to worsen 5 days ago  Plan: Discussed waxing and waning nature of COVID, doubt secondary bacterial infection without fever, sob, etc  Continue symptomatic cares   I will see him in the clinic in person on Friday to listen to his lungs, etc, discussed when to be seen promptly    Armida Patton PA-C      Subjective   Fadi is a 37 year old presenting for the following health issues:  Covid Concern      History of Present Illness       Reason for visit:  Covid symptome and sinus pressure  Symptom onset:  3-4 weeks ago  Symptoms include:  HA, sinus pressure cough congestion  Symptom intensity:  Moderate  Symptom progression:  Staying the same  Had these symptoms before:  Yes  Has tried/received treatment for these symptoms:  Yes  Previous treatment was successful:  Yes  Prior treatment description:  Time and antibiotic  What makes it worse:  Lack of sleep  What makes it better:  Sleep, hot tea,shower, tylenol    He eats 0-1 servings of fruits and vegetables daily.He consumes 0 sweetened beverage(s) daily.He exercises with enough effort to increase his heart rate 30 to 60 minutes per day.  He exercises with enough effort to increase his heart rate 4 days per week.   He is taking medications regularly.     Fadi is here for a sinus issue  He had covid earlier this month, symptom onset 11/4/22  He improved significantly 4-5 days after onset of symptoms but never fully got back to  baseline  He started feeling a bit worse 5 days ago--he is having fatigue, nasal congestion and sinus pressure and frontal head pain  He has been using sudafed and tylenol and feels better  He denies fever  He has coughed throughout covid   He denies chest pain, leg swelling   He denies significant shortness of breath but has felt more fatigued with exertion since getting covid   He is fully vaccinated and boosted      Review of Systems   See above      Objective           Vitals:  No vitals were obtained today due to virtual visit.    Physical Exam   GENERAL: Healthy, alert and no distress  EYES: Eyes grossly normal to inspection.  No discharge or erythema, or obvious scleral/conjunctival abnormalities.  RESP: No audible wheeze, cough, or visible cyanosis.  No visible retractions or increased work of breathing.    SKIN: Visible skin clear. No significant rash, abnormal pigmentation or lesions.  NEURO: Cranial nerves grossly intact.  Mentation and speech appropriate for age.  PSYCH: Mentation appears normal, affect normal/bright, judgement and insight intact, normal speech and appearance well-groomed.            Video-Visit Details    Video Start Time:12:01  Type of service:  Video Visit    Video End Time:12:17 PM    Originating Location (pt. Location): Home        Distant Location (provider location):  On-site    Platform used for Video Visit: Danita

## 2022-11-23 NOTE — Clinical Note
Could you pls schedule patient in one of my open slots on Friday for in-person visit and let him know when to come in?  Any open slot is okay.  Thank you

## 2022-11-25 ENCOUNTER — OFFICE VISIT (OUTPATIENT)
Dept: FAMILY MEDICINE | Facility: CLINIC | Age: 37
End: 2022-11-25
Payer: COMMERCIAL

## 2022-11-25 VITALS
TEMPERATURE: 97.4 F | WEIGHT: 152 LBS | OXYGEN SATURATION: 100 % | HEIGHT: 70 IN | BODY MASS INDEX: 21.76 KG/M2 | RESPIRATION RATE: 18 BRPM | SYSTOLIC BLOOD PRESSURE: 119 MMHG | DIASTOLIC BLOOD PRESSURE: 76 MMHG | HEART RATE: 85 BPM

## 2022-11-25 DIAGNOSIS — J06.9 UPPER RESPIRATORY TRACT INFECTION, UNSPECIFIED TYPE: Primary | ICD-10-CM

## 2022-11-25 PROCEDURE — 99213 OFFICE O/P EST LOW 20 MIN: CPT | Performed by: PHYSICIAN ASSISTANT

## 2022-11-25 ASSESSMENT — PAIN SCALES - GENERAL: PAINLEVEL: NO PAIN (0)

## 2022-11-25 NOTE — PATIENT INSTRUCTIONS
Continue symptomatic cares    Take tylenol as needed for pain up to 1000mg three times daily, do not exceed 3000mg in 24 hour period    Take ibuprofen as needed for pain up to 600mg three times daily with food    Fluids and rest

## 2022-11-25 NOTE — PROGRESS NOTES
"Assessment and Plan:     (J06.9) Upper respiratory tract infection, unspecified type  (primary encounter diagnosis)  Comment: improving, lungs CTAB  Plan: symptomatic cares       Armida Patton PA-C        Subjective   Fadi is a 37 year old presenting for the following health issues:  RECHECK (Lungs)      HPI     Fadi is here for follow-up  I saw him earlier this week regarding his recent covid infection  He is feeling better overall  He started having symptoms 3 weeks ago and felt like he was recovering until about one week ago when he had an increase in symptoms--mostly coughing   He denies leg swelling, chest pain, fever/chills   He has had some mild sob w/walking up hills otherwise none  He denies sore throat, ear pain    Review of Systems   See above      Objective      /76 (BP Location: Right arm, Patient Position: Sitting, Cuff Size: Adult Regular)   Pulse 85   Temp 97.4  F (36.3  C) (Temporal)   Resp 18   Ht 1.778 m (5' 10\")   Wt 68.9 kg (152 lb)   SpO2 100%   BMI 21.81 kg/m        Physical Exam     EXAM:  GENERAL APPEARANCE: healthy, alert and no distress  EYES: Eyes grossly normal to inspection, conjunctivae and sclerae normal  HENT: ear canals and TMs normal and nose and mouth without ulcers or lesions, oropharynx is clear without exudate or erythema, no tonsillar swelling  NECK: no adenopathy, no asymmetry, masses, or scars and thyroid normal to palpation  RESP: lungs clear to auscultation - no rales, rhonchi or wheezes  CV: regular rates and rhythm, normal S1 S2, no S3 or S4 and no murmur, click or rub  EXT: no edema              "

## 2023-01-14 ENCOUNTER — HEALTH MAINTENANCE LETTER (OUTPATIENT)
Age: 38
End: 2023-01-14

## 2023-02-23 ENCOUNTER — OFFICE VISIT (OUTPATIENT)
Dept: FAMILY MEDICINE | Facility: CLINIC | Age: 38
End: 2023-02-23
Payer: COMMERCIAL

## 2023-02-23 VITALS
SYSTOLIC BLOOD PRESSURE: 107 MMHG | OXYGEN SATURATION: 97 % | BODY MASS INDEX: 21.53 KG/M2 | DIASTOLIC BLOOD PRESSURE: 72 MMHG | RESPIRATION RATE: 16 BRPM | HEART RATE: 77 BPM | HEIGHT: 70 IN | WEIGHT: 150.4 LBS | TEMPERATURE: 97.8 F

## 2023-02-23 DIAGNOSIS — J02.9 SORE THROAT: Primary | ICD-10-CM

## 2023-02-23 DIAGNOSIS — H10.9 CONJUNCTIVITIS OF BOTH EYES, UNSPECIFIED CONJUNCTIVITIS TYPE: ICD-10-CM

## 2023-02-23 LAB
DEPRECATED S PYO AG THROAT QL EIA: NEGATIVE
GROUP A STREP BY PCR: NOT DETECTED
SARS-COV-2 RNA RESP QL NAA+PROBE: NEGATIVE

## 2023-02-23 PROCEDURE — 99213 OFFICE O/P EST LOW 20 MIN: CPT | Mod: CS | Performed by: INTERNAL MEDICINE

## 2023-02-23 PROCEDURE — U0003 INFECTIOUS AGENT DETECTION BY NUCLEIC ACID (DNA OR RNA); SEVERE ACUTE RESPIRATORY SYNDROME CORONAVIRUS 2 (SARS-COV-2) (CORONAVIRUS DISEASE [COVID-19]), AMPLIFIED PROBE TECHNIQUE, MAKING USE OF HIGH THROUGHPUT TECHNOLOGIES AS DESCRIBED BY CMS-2020-01-R: HCPCS | Performed by: INTERNAL MEDICINE

## 2023-02-23 PROCEDURE — 87651 STREP A DNA AMP PROBE: CPT | Performed by: INTERNAL MEDICINE

## 2023-02-23 PROCEDURE — U0005 INFEC AGEN DETEC AMPLI PROBE: HCPCS | Performed by: INTERNAL MEDICINE

## 2023-02-23 RX ORDER — OFLOXACIN 3 MG/ML
1-2 SOLUTION/ DROPS OPHTHALMIC
Qty: 10 ML | Refills: 0 | Status: SHIPPED | OUTPATIENT
Start: 2023-02-23 | End: 2023-07-26

## 2023-02-23 ASSESSMENT — PAIN SCALES - GENERAL: PAINLEVEL: MILD PAIN (2)

## 2023-02-23 ASSESSMENT — ENCOUNTER SYMPTOMS: EYE PAIN: 1

## 2023-02-23 NOTE — PROGRESS NOTES
"  Assessment & Plan     Sore throat  Probably a viral URI  He would like a COVID and Strep test  - Symptomatic COVID-19 Virus (Coronavirus) by PCR; Future  - Streptococcus A Rapid Screen w/Reflex to PCR - Clinic Collect    Conjunctivitis of both eyes, unspecified conjunctivitis type  Allergies to sulfa and amoxicillin   Treat conjunctivitis as below   - ofloxacin (OCUFLOX) 0.3 % ophthalmic solution; Place 1-2 drops into both eyes every 2 hours (while awake)    If symptoms beyond 10 days consider chest x-ray or treatment for sinusitis; discussed with patient  Discussed symptom management strategies   18 minutes spent on the date of the encounter doing chart review, history and exam, documentation and further activities per the note           Return in about 1 week (around 3/2/2023) for recheck if symptoms persist.  Patient instructed to return to clinic or contact us sooner if symptoms worsen or new symptoms develop.     Amos Zuñiga MD  [facility] YOVANI Aponte is a 38 year old, presenting for the following health issues:  Eye Problem      Eye Problem     History of Present Illness       Reason for visit:  Sore throat conjunctivitis  Symptom onset:  3-7 days ago    He eats 4 or more servings of fruits and vegetables daily.He consumes 0 sweetened beverage(s) daily.He exercises with enough effort to increase his heart rate 20 to 29 minutes per day.  He exercises with enough effort to increase his heart rate 4 days per week.   He is taking medications regularly.         5 days  Sore throat no fever  Mild non productive cough, no dyspnea  3 days of eye mattering and red painful eyes      Review of Systems   Eyes: Positive for pain.            Objective    /72 (BP Location: Left arm, Patient Position: Sitting, Cuff Size: Adult Large)   Pulse 77   Temp 97.8  F (36.6  C) (Tympanic)   Resp 16   Ht 1.778 m (5' 10\")   Wt 68.2 kg (150 lb 6.4 oz)   SpO2 97%   BMI 21.58 kg/m    Body " mass index is 21.58 kg/m .  Physical Exam   GENERAL: healthy, alert and no distress  EYES: conjunctiva/corneas- conjunctival injection OU and yellow colored discharge present bilateral  HENT: normal cephalic/atraumatic, ear canals and TM's normal, nose and mouth without ulcers or lesions, oropharynx clear, oral mucous membranes moist and sinuses: not tender  NECK: no adenopathy, no asymmetry, masses, or scars and thyroid normal to palpation  RESP: lungs clear to auscultation - no rales, rhonchi or wheezes  MS: no gross musculoskeletal defects noted, no edema  NEURO: Normal strength and tone, mentation intact and speech normal  PSYCH: mentation appears normal, affect normal/bright

## 2023-02-24 NOTE — RESULT ENCOUNTER NOTE
The following letter pertains to your most recent diagnostic tests:    Strep testing is negative.  This is not strep throat.  COVID result is still pending.        Sincerely,    Dr. Zuñiga

## 2023-03-20 ENCOUNTER — IMMUNIZATION (OUTPATIENT)
Dept: NURSING | Facility: CLINIC | Age: 38
End: 2023-03-20
Payer: COMMERCIAL

## 2023-03-20 PROCEDURE — 0124A COVID-19 VACCINE BIVALENT BOOSTER 12+ (PFIZER): CPT

## 2023-03-20 PROCEDURE — 91312 COVID-19 VACCINE BIVALENT BOOSTER 12+ (PFIZER): CPT

## 2023-03-24 ENCOUNTER — TRANSFERRED RECORDS (OUTPATIENT)
Dept: HEALTH INFORMATION MANAGEMENT | Facility: CLINIC | Age: 38
End: 2023-03-24

## 2023-04-04 ENCOUNTER — TRANSFERRED RECORDS (OUTPATIENT)
Dept: FAMILY MEDICINE | Facility: CLINIC | Age: 38
End: 2023-04-04
Payer: COMMERCIAL

## 2023-04-21 ENCOUNTER — TRANSFERRED RECORDS (OUTPATIENT)
Dept: HEALTH INFORMATION MANAGEMENT | Facility: CLINIC | Age: 38
End: 2023-04-21
Payer: COMMERCIAL

## 2023-05-17 ENCOUNTER — ALLIED HEALTH/NURSE VISIT (OUTPATIENT)
Dept: FAMILY MEDICINE | Facility: CLINIC | Age: 38
End: 2023-05-17
Payer: COMMERCIAL

## 2023-05-17 DIAGNOSIS — Z23 ENCOUNTER FOR IMMUNIZATION: Primary | ICD-10-CM

## 2023-05-17 PROCEDURE — 99207 PR NO CHARGE NURSE ONLY: CPT

## 2023-05-17 PROCEDURE — 90715 TDAP VACCINE 7 YRS/> IM: CPT

## 2023-05-17 PROCEDURE — 90471 IMMUNIZATION ADMIN: CPT

## 2023-05-17 NOTE — PROGRESS NOTES
Prior to immunization administration, verified patients identity using patient s name and date of birth. Please see Immunization Activity for additional information.     Screening Questionnaire for Adult Immunization    Are you sick today?   No   Do you have allergies to medications, food, a vaccine component or latex?   No   Have you ever had a serious reaction after receiving a vaccination?   No   Do you have a long-term health problem with heart, lung, kidney, or metabolic disease (e.g., diabetes), asthma, a blood disorder, no spleen, complement component deficiency, a cochlear implant, or a spinal fluid leak?  Are you on long-term aspirin therapy?   No   Do you have cancer, leukemia, HIV/AIDS, or any other immune system problem?   No   Do you have a parent, brother, or sister with an immune system problem?   No   In the past 3 months, have you taken medications that affect  your immune system, such as prednisone, other steroids, or anticancer drugs; drugs for the treatment of rheumatoid arthritis, Crohn s disease, or psoriasis; or have you had radiation treatments?   No   Have you had a seizure, or a brain or other nervous system problem?   No   During the past year, have you received a transfusion of blood or blood    products, or been given immune (gamma) globulin or antiviral drug?   No   For women: Are you pregnant or is there a chance you could become       pregnant during the next month?   No   Have you received any vaccinations in the past 4 weeks?   No     Immunization questionnaire answers were all negative.    I have reviewed the following standing orders:   This patient is due and qualifies for a TDAP vaccine.    Click here for Tdap Standing Order    I have reviewed the vaccines inclusion and exclusion criteria; No concerns regarding eligibility.         Patient instructed to remain in clinic for 15 minutes afterwards, and to report any adverse reactions.     Screening performed by Bhavna Dougherty  MA on 5/17/2023 at 1:27 PM.

## 2023-05-18 ENCOUNTER — DOCUMENTATION ONLY (OUTPATIENT)
Dept: LAB | Facility: CLINIC | Age: 38
End: 2023-05-18

## 2023-05-18 NOTE — PROGRESS NOTES
We will obtain all appropriate labs at the time of the visit.  Pt should not feel pressured to fast if it's a later appointment slot.  Hayder Reis MD on 5/18/2023 at 11:27 AM

## 2023-05-18 NOTE — PROGRESS NOTES
Emir Angela has an upcoming lab appointment:    Future Appointments   Date Time Provider Department Center   5/19/2023  1:45 PM [facility]     Patient is scheduled for the following lab(s): pended    There is no order available. Please review and place either future orders or HMPO (Review of Health Maintenance Protocol Orders), as appropriate.    Health Maintenance Due   Topic     ANNUAL REVIEW OF HM ORDERS      HIV SCREENING      LIPID      Keira Patel

## 2023-05-19 ENCOUNTER — TRANSFERRED RECORDS (OUTPATIENT)
Dept: HEALTH INFORMATION MANAGEMENT | Facility: CLINIC | Age: 38
End: 2023-05-19
Payer: COMMERCIAL

## 2023-05-30 ENCOUNTER — TRANSFERRED RECORDS (OUTPATIENT)
Dept: HEALTH INFORMATION MANAGEMENT | Facility: CLINIC | Age: 38
End: 2023-05-30
Payer: COMMERCIAL

## 2023-06-02 ENCOUNTER — HEALTH MAINTENANCE LETTER (OUTPATIENT)
Age: 38
End: 2023-06-02

## 2023-06-17 ENCOUNTER — OFFICE VISIT (OUTPATIENT)
Dept: URGENT CARE | Facility: URGENT CARE | Age: 38
End: 2023-06-17
Payer: COMMERCIAL

## 2023-06-17 VITALS
OXYGEN SATURATION: 99 % | WEIGHT: 151.5 LBS | HEART RATE: 66 BPM | TEMPERATURE: 97.2 F | RESPIRATION RATE: 16 BRPM | BODY MASS INDEX: 21.74 KG/M2 | SYSTOLIC BLOOD PRESSURE: 119 MMHG | DIASTOLIC BLOOD PRESSURE: 80 MMHG

## 2023-06-17 DIAGNOSIS — B88.0: Primary | ICD-10-CM

## 2023-06-17 PROCEDURE — 99214 OFFICE O/P EST MOD 30 MIN: CPT | Performed by: STUDENT IN AN ORGANIZED HEALTH CARE EDUCATION/TRAINING PROGRAM

## 2023-06-17 RX ORDER — TRIAMCINOLONE ACETONIDE 1 MG/G
CREAM TOPICAL 2 TIMES DAILY
Qty: 30 G | Refills: 0 | Status: SHIPPED | OUTPATIENT
Start: 2023-06-17 | End: 2023-06-17

## 2023-06-17 NOTE — PROGRESS NOTES
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 No other lesions. Exam consistent with bite from rare pyemotes mite ventrrandallsus and had headache and vomiting for a couple days after the lesion appeared which is also consistent with a bite from this type of mite. From the literature I could find this is self-limited and treatment is with topical steroid for itching. He has already washed clothes and sheets. There is nothing in the literature for guidance on whether aggressive house cleaning is needed but one piece of literature noted a \"house infestation\" so I referred to infectious disease to consult. Likely picked up either in the woods playing disc golf or sleeping in a run-down motel in [facility] [place] where he said there was questionable cleanliness of the bed.   - Infectious Disease Referral     30 minutes spent by me on the date of the encounter doing chart review, patient visit and documentation         No follow-ups on file.    [facility]    Jennifer Aponte is a 38 year old male who presents to clinic today for the following health issues:  Chief Complaint   Patient presents with     Insect Bites     Bug bite on right breast. Possible mite bite (pyemotes ventricosus) Onset- two weeks.      HPI   Found after he was in the woods playing disc golf and slept in a run-down motel in [place] [facility] [place] where he said there was questionable cleanliness of the bed. Had headache and vomiting for a couple days after the lesion appeared.      Review of Systems  Constitutional, HEENT, cardiovascular, pulmonary, GI, , musculoskeletal, neuro, skin, endocrine and psych systems are negative, except as otherwise noted.      Objective    /80 (BP Location: Left arm, " "Patient Position: Sitting, Cuff Size: Adult Regular)   Pulse 66   Temp 97.2  F (36.2  C) (Tympanic)   Resp 16   Wt 68.7 kg (151 lb 8 oz)   SpO2 99%   BMI 21.74 kg/m    Physical Exam   GENERAL APPEARANCE: healthy, alert and no distress  MS: extremities normal- no gross deformities noted  SKIN: round red lesion approximately quarter-sized with central scab and curved linear \"comet tail\" extending laterally from lesion that is not raised, warm, tender or indurated  NEURO: Normal strength and tone, mentation intact and speech normal  PSYCH: mentation appears normal and affect normal/bright          "

## 2023-07-05 ENCOUNTER — OFFICE VISIT (OUTPATIENT)
Dept: CT IMAGING | Facility: CLINIC | Age: 38
End: 2023-07-05
Attending: STUDENT IN AN ORGANIZED HEALTH CARE EDUCATION/TRAINING PROGRAM
Payer: COMMERCIAL

## 2023-07-05 VITALS
SYSTOLIC BLOOD PRESSURE: 118 MMHG | WEIGHT: 152.06 LBS | OXYGEN SATURATION: 99 % | BODY MASS INDEX: 21.82 KG/M2 | DIASTOLIC BLOOD PRESSURE: 82 MMHG | HEART RATE: 82 BPM

## 2023-07-05 DIAGNOSIS — B88.0: ICD-10-CM

## 2023-07-05 PROCEDURE — G0463 HOSPITAL OUTPT CLINIC VISIT: HCPCS | Performed by: INTERNAL MEDICINE

## 2023-07-05 PROCEDURE — 99202 OFFICE O/P NEW SF 15 MIN: CPT | Performed by: INTERNAL MEDICINE

## 2023-07-05 ASSESSMENT — PAIN SCALES - GENERAL: PAINLEVEL: NO PAIN (0)

## 2023-07-05 NOTE — PROGRESS NOTES
[facility]  General Infectious Disease Clinic Note:  New Patient     Patient:  Emir Angela, Date of birth 1985   Medical record number [id number]  Date of Visit:  07/05/2023  Consult requested by Shakila Rao APRN CNP for evaluation of Infestation by pyemotes ventricosus.    ASSESSMENT AND PLAN     Emir Angela is a 38 year old male clinically diagnosed with Pyemotes ventricosus Dermatitis. It is now getting better. If it recurs, would recommend referral to dermatology for confirmation and alternative diagnosis. We discussed signs and symptoms of Lyme disease as he has exposure. Would test if he has symptoms and not advisable to test now due to high false positive rates.     IMPRESSION:  1. Pyemotes ventricosus Dermatitis    RECOMMENDATIONS:  1. Observation     15 minutes spent by me on the date of the encounter doing chart review, history and exam, documentation and further activities per the note    CHANTELLE RUVALCABA MD    Infectious Diseases  Contact me on [employer] or Find my pager on [employer].     SUBJECTIVE       History of Present lllness:    Emir Angela is a 38 year old male who went on a trip to [facility] and stayed in a cabin. He remembers noticing the rash second week of June roughly a week after his trip. He did not see the bug bite. He had some headaches and vomiting during that time but is now resolved. No fever, chills, joint pains. The rash is getting better now.       Past Medical History:   Diagnosis Date     Chronic nonallergic rhinitis 5/27/10 skin tests    all negative     Gastroesophageal reflux disease without esophagitis 10/6/2015     GERD (gastroesophageal reflux disease)        Past Surgical History:   Procedure Laterality Date     APPENDECTOMY  2004     ESOPHAGOSCOPY, GASTROSCOPY, DUODENOSCOPY (EGD), COMBINED N/A 11/9/2018    Procedure: COMBINED ESOPHAGOSCOPY, GASTROSCOPY, DUODENOSCOPY (EGD), BIOPSY SINGLE OR MULTIPLE;   Surgeon: Remy Vigil MD;  Location: [facility]     IR LIVER BIOPSY PERCUTANEOUS  9/1/2020     IRRIGATION AND DEBRIDEMENT HAND, COMBINED Right 5/20/2020    Procedure: Irrigation and debridement of the right long finger and its flexor sheath;  Surgeon: Jeannine Escobar MD;  Location: [facility]     PERCUTANEOUS BIOPSY LIVER Right 9/1/2020    Procedure: NEEDLE BIOPSY, LIVER, PERCUTANEOUS;  Surgeon: Shelly Meyer MD;  [facility]     SINUS SURGERY  4/27/10       Family History   Problem Relation Age of Onset     Hypertension Father      Cardiovascular Maternal Grandfather      Heart Disease Maternal Grandfather      Allergies Brother        Social History     Social History Narrative    Single. Physical therapist at [employer]     Social History     Tobacco Use     Smoking status: Never     Smokeless tobacco: Never   Substance Use Topics     Alcohol use: Yes     Alcohol/week: 0.0 standard drinks of alcohol     Comment: 0-6 per week      Drug use: No         OBJECTIVE     /82 (BP Location: Right arm, Patient Position: Sitting, Cuff Size: Adult Regular)   Pulse 82   Wt 69 kg (152 lb 1 oz)   SpO2 99%   BMI 21.82 kg/m      Wt Readings from Last 4 Encounters:   06/17/23 68.7 kg (151 lb 8 oz)   02/23/23 68.2 kg (150 lb 6.4 oz)   11/25/22 68.9 kg (152 lb)   09/16/22 69.1 kg (152 lb 6.4 oz)       Physical Exam:  GENERAL: well-developed, well-nourished, alert, oriented, in no acute distress.  SKIN: erythematous serpiginous track extending laterally to the right side, faint compared to the photo

## 2023-07-26 ENCOUNTER — OFFICE VISIT (OUTPATIENT)
Dept: FAMILY MEDICINE | Facility: CLINIC | Age: 38
End: 2023-07-26
Payer: COMMERCIAL

## 2023-07-26 VITALS
WEIGHT: 150.9 LBS | HEIGHT: 70 IN | HEART RATE: 76 BPM | BODY MASS INDEX: 21.6 KG/M2 | OXYGEN SATURATION: 97 % | TEMPERATURE: 97.9 F | DIASTOLIC BLOOD PRESSURE: 76 MMHG | SYSTOLIC BLOOD PRESSURE: 112 MMHG

## 2023-07-26 DIAGNOSIS — B07.0 PLANTAR WART: Primary | ICD-10-CM

## 2023-07-26 DIAGNOSIS — J02.9 ACUTE PHARYNGITIS, UNSPECIFIED ETIOLOGY: ICD-10-CM

## 2023-07-26 DIAGNOSIS — L30.9 DERMATITIS: ICD-10-CM

## 2023-07-26 LAB
DEPRECATED S PYO AG THROAT QL EIA: NEGATIVE
GROUP A STREP BY PCR: NOT DETECTED

## 2023-07-26 PROCEDURE — 87635 SARS-COV-2 COVID-19 AMP PRB: CPT | Performed by: INTERNAL MEDICINE

## 2023-07-26 PROCEDURE — 99214 OFFICE O/P EST MOD 30 MIN: CPT | Performed by: INTERNAL MEDICINE

## 2023-07-26 PROCEDURE — 87651 STREP A DNA AMP PROBE: CPT | Performed by: INTERNAL MEDICINE

## 2023-07-26 RX ORDER — TRIAMCINOLONE ACETONIDE 1 MG/G
CREAM TOPICAL 2 TIMES DAILY
Qty: 45 G | Refills: 0 | Status: SHIPPED | OUTPATIENT
Start: 2023-07-26 | End: 2023-10-23

## 2023-07-26 ASSESSMENT — PAIN SCALES - GENERAL: PAINLEVEL: NO PAIN (0)

## 2023-07-26 NOTE — PROGRESS NOTES
"  Assessment & Plan     Plantar wart  The patient is informed of risks of cryo therapy including but not limited to hypopigmentation, pain, redness, swelling, blister formation, and infection.  Patient would like to proceed.      Cryo performed to L 2nd toe.    Total lesions:  1  Tolerated well.  Instructed to return for repeat treatment in 1 weeks if not resolved.       Dermatitis  Trial TAC.  If no improvement refer to dermatology.  - triamcinolone (KENALOG) 0.1 % external cream  Dispense: 45 g; Refill: 0    Acute pharyngitis, unspecified etiology  SPECT viral or environmental.  No associated symptoms.  Will check for strep and COVID.  - Streptococcus A Rapid Screen w/Reflex to PCR - Clinic Collect  - Symptomatic COVID-19 Virus (Coronavirus) by PCR        Hayder Reis MD  [facility] YOVANI Aponte is a 38 year old, presenting for the following health issues:  Derm Problem (Wart removal foot; pt also has a small rash on his nose) and Pharyngitis (Sore throat since yesterday, no other sx)    HPI     Wart:  L 2nd toe. Ongoing for months.  +pain now.  Would like removed    Rash:  L side of nose.      Pharyngitis:  Since yesterday.  No chills or sweats.  No sick contacts.             Review of Systems         Objective    /76 (BP Location: Left arm, Patient Position: Sitting, Cuff Size: Adult Regular)   Pulse 76   Temp 97.9  F (36.6  C)   Ht 1.778 m (5' 10\")   Wt 68.4 kg (150 lb 14.4 oz)   SpO2 97%   BMI 21.65 kg/m    Body mass index is 21.65 kg/m .  Physical Exam   GEN NAD  ENT MMM, no tonsillar exudates.  SKIN:  L 2nd toe distal plantar wart.    Macular irritation L nasal fold.                        "

## 2023-07-27 LAB — SARS-COV-2 RNA RESP QL NAA+PROBE: NEGATIVE

## 2023-10-05 ENCOUNTER — VIRTUAL VISIT (OUTPATIENT)
Dept: INTERNAL MEDICINE | Facility: CLINIC | Age: 38
End: 2023-10-05
Payer: COMMERCIAL

## 2023-10-05 DIAGNOSIS — R09.81 NASAL CONGESTION: ICD-10-CM

## 2023-10-05 DIAGNOSIS — Z20.828 RSV EXPOSURE: ICD-10-CM

## 2023-10-05 DIAGNOSIS — R05.1 ACUTE COUGH: Primary | ICD-10-CM

## 2023-10-05 PROCEDURE — 99213 OFFICE O/P EST LOW 20 MIN: CPT | Mod: 95

## 2023-10-05 ASSESSMENT — ENCOUNTER SYMPTOMS
SINUS PAIN: 1
COUGH: 1
SINUS PRESSURE: 1

## 2023-10-05 NOTE — PROGRESS NOTES
Answers submitted by the patient for this visit:  General Questionnaire (Submitted on 10/5/2023)  Chief Complaint: Chronic problems general questions HPI Form  How many servings of fruits and vegetables do you eat daily?: 2-3  On average, how many sweetened beverages do you drink each day (Examples: soda, juice, sweet tea, etc.  Do NOT count diet or artificially sweetened beverages)?: 0  How many minutes a day do you exercise enough to make your heart beat faster?: 30 to 60  How many days a week do you exercise enough to make your heart beat faster?: 3 or less  How many days per week do you miss taking your medication?: 0  General Concern (Submitted on 10/5/2023)  Chief Complaint: Chronic problems general questions HPI Form  What is the reason for your visit today?: RSV check  When did your symptoms begin?: 1-3 days ago  What are your symptoms?: congestion stuffy nose  How would you describe these symptoms?: Moderate  Are your symptoms:: Staying the same  Have you had these symptoms before?: Yes  Have you tried or received treatment for these symptoms before?: Yes  Did that treatment work? : Yes  Please describe the treatment you had:: Rest  Is there anything that makes you feel worse?: na  Is there anything that makes you feel better?: pablo Aponte is a 38 year old who is being evaluated via a billable video visit.      How would you like to obtain your AVS? MyChart  If the video visit is dropped, the invitation should be resent by: Text to cell phone: [phone]  Will anyone else be joining your video visit? No          (R05.1) Acute cough  (primary encounter diagnosis)  Comment: Patient complaint of cough.Patient exposed to RSV through child and is now having symptoms. Has infant at home. Worried about exposure for that child. Discussed with patient options for testing. Discussed use of over the counter medications to help ease patient symptoms.  Plan: Symptomatic Influenza A/B, RSV, & SARS-CoV2 PCR         (COVID-19)        Future    (Z20.828) RSV exposure  Comment: Recent exposure to RSV and is symptomatic  Plan: Symptomatic Influenza A/B, RSV, & SARS-CoV2 PCR        (COVID-19)        Future    (R09.81) Nasal congestion  Comment: Discussed nasal congestion and use of Over the counter medication to ease symptoms.  Plan: Symptomatic Influenza A/B, RSV, & SARS-CoV2 PCR (COVID-19)         Future         Subjective   Fadi is a 38 year old, presenting for the following health issues:  Cough and Nasal Congestion        No data to display                History of Present Illness       Reason for visit:  RSV check  Symptom onset:  1-3 days ago  Symptoms include:  Congestion stuffy nose  Symptom intensity:  Moderate  Symptom progression:  Staying the same  Had these symptoms before:  Yes  Has tried/received treatment for these symptoms:  Yes  Previous treatment was successful:  Yes  Prior treatment description:  Rest  What makes it worse:  Na  What makes it better:  Sudafed tylenol    He eats 2-3 servings of fruits and vegetables daily.He consumes 0 sweetened beverage(s) daily.He exercises with enough effort to increase his heart rate 30 to 60 minutes per day.  He exercises with enough effort to increase his heart rate 3 or less days per week.   He is taking medications regularly.       Son has RSV and now he has a cough, nasal congestion and fatigue x 2 days         Review of Systems   HENT:  Positive for congestion, sinus pressure and sinus pain.    Respiratory:  Positive for cough.             Objective           Vitals:  No vitals were obtained today due to virtual visit.    Physical Exam   GENERAL: Healthy, alert and no distress  EYES: Eyes grossly normal to inspection.  No discharge or erythema, or obvious scleral/conjunctival abnormalities.  RESP: No audible wheeze, cough, or visible cyanosis.  No visible retractions or increased work of breathing.    SKIN: Visible skin clear. No significant rash, abnormal  pigmentation or lesions.  NEURO: Cranial nerves grossly intact.  Mentation and speech appropriate for age.  PSYCH: Mentation appears normal, affect normal/bright, judgement and insight intact, normal speech and appearance well-groomed.              Video-Visit Details    Type of service:  Video Visit     Originating Location (pt. Location): Other work    Distant Location (provider location):  On-site  Platform used for Video Visit: [employer]

## 2023-10-06 ENCOUNTER — LAB (OUTPATIENT)
Dept: FAMILY MEDICINE | Facility: CLINIC | Age: 38
End: 2023-10-06
Payer: COMMERCIAL

## 2023-10-06 DIAGNOSIS — Z20.828 RSV EXPOSURE: ICD-10-CM

## 2023-10-06 DIAGNOSIS — R05.1 ACUTE COUGH: ICD-10-CM

## 2023-10-06 DIAGNOSIS — R09.81 NASAL CONGESTION: ICD-10-CM

## 2023-10-06 LAB
FLUAV RNA SPEC QL NAA+PROBE: NEGATIVE
FLUBV RNA RESP QL NAA+PROBE: NEGATIVE
RSV RNA SPEC NAA+PROBE: NEGATIVE
SARS-COV-2 RNA RESP QL NAA+PROBE: NEGATIVE

## 2023-10-06 PROCEDURE — 99207 PR NO CHARGE NURSE ONLY: CPT

## 2023-10-06 PROCEDURE — 87637 SARSCOV2&INF A&B&RSV AMP PRB: CPT

## 2023-10-23 ENCOUNTER — VIRTUAL VISIT (OUTPATIENT)
Dept: UROLOGY | Facility: CLINIC | Age: 38
End: 2023-10-23
Payer: COMMERCIAL

## 2023-10-23 ENCOUNTER — OFFICE VISIT (OUTPATIENT)
Dept: DERMATOLOGY | Facility: CLINIC | Age: 38
End: 2023-10-23
Payer: COMMERCIAL

## 2023-10-23 DIAGNOSIS — L21.9 DERMATITIS, SEBORRHEIC: Primary | ICD-10-CM

## 2023-10-23 DIAGNOSIS — B07.0 PLANTAR WART: ICD-10-CM

## 2023-10-23 DIAGNOSIS — Z30.09 VASECTOMY EVALUATION: Primary | ICD-10-CM

## 2023-10-23 PROCEDURE — 99203 OFFICE O/P NEW LOW 30 MIN: CPT | Mod: VID | Performed by: UROLOGY

## 2023-10-23 PROCEDURE — 99203 OFFICE O/P NEW LOW 30 MIN: CPT | Performed by: DERMATOLOGY

## 2023-10-23 RX ORDER — KETOCONAZOLE 20 MG/G
CREAM TOPICAL 2 TIMES DAILY
Qty: 60 G | Refills: 11 | Status: SHIPPED | OUTPATIENT
Start: 2023-10-23 | End: 2024-01-23

## 2023-10-23 RX ORDER — TACROLIMUS 1 MG/G
OINTMENT TOPICAL 2 TIMES DAILY
Qty: 100 G | Refills: 3 | Status: SHIPPED | OUTPATIENT
Start: 2023-10-23 | End: 2024-01-23

## 2023-10-23 NOTE — PROGRESS NOTES
[facility] Dermatology Note    Encounter Date: Oct 23, 2023    Dermatology Problem List:  Seborrheic dermatitis: ketoconazole cream, tacrolimus ointment    ______________________________________    Impression/Plan:  1. Seborrheic dermatitis: predominantly facial. Will start topicals.  - ketoconazole cream, tacrolimus ointment    2. Wart: discussed cryotherapy, salicylic acid. Patient will be on feet >15 hrs tomorrow so defer cryotherapy today. To revisit at follow up.    3. Reassurance provided for benign lesions not treated today including cherry angiomata, solar lentigines, seborrheic keratoses, and banal-appearing melanocytic nevi.        Follow-up in 3 months.       Staff Involved:  Staff Only    René Kunz MD   of Dermatology  Department of Dermatology  [facility]      CC:   Chief Complaint   Patient presents with    Rash     Rash near nostrils that has been present for the past 5 years.  Has tried treatments with little success. Has tried Kenalog cream and hydrocortisone.      Wart     Wart on left middle toe.        History of Present Illness:  Mr. Emir Angela is a 38 year old male who presents as a new patient.    Spot on the face - present ~5 years  - got smaller and treated, now getting larger again  - prior hydrocortisone 2.5%, triamcinolone x3 weeks, OTC moisturizers  - occasionally itchy if very dry  - red dry patches on face    Left third toe wart - small - prior cryotherapy - didn't seem to help  - on feet x15 hours tomorrow    Labs:  N/A    Physical exam:  Vitals: There were no vitals taken for this visit.  GEN: This is a well developed, well-nourished male in no acute distress, in a pleasant mood.    SKIN: Pond phototype II  - Full skin, which includes the head/face, both arms, chest, back, abdomen,both legs, genitalia and/or groin buttocks, digits and/or nails, was examined.  - verrucous papule on the left  third toe  - Erythema and scaling on the nasolabial folds and alar crease  - There are dome shaped bright red papules on the head/neck, trunk, extremities.   - Multiple regular brown pigmented macules and papules are identified on the head/neck, trunk, extremities.   - Scattered brown macules on sun exposed areas.  - There are waxy stuck on tan to brown papules on the head/neck, trunk, extremities.  - No other lesions of concern on areas examined.     Past Medical History:   Past Medical History:   Diagnosis Date    Chronic nonallergic rhinitis 5/27/10 skin tests    all negative    Gastroesophageal reflux disease without esophagitis 10/6/2015    GERD (gastroesophageal reflux disease)      Past Surgical History:   Procedure Laterality Date    APPENDECTOMY  2004    ESOPHAGOSCOPY, GASTROSCOPY, DUODENOSCOPY (EGD), COMBINED N/A 11/9/2018    Procedure: COMBINED ESOPHAGOSCOPY, GASTROSCOPY, DUODENOSCOPY (EGD), BIOPSY SINGLE OR MULTIPLE;  Surgeon: Remy Vigil MD;  Location: [facility]    IR LIVER BIOPSY PERCUTANEOUS  9/1/2020    IRRIGATION AND DEBRIDEMENT HAND, COMBINED Right 5/20/2020    Procedure: Irrigation and debridement of the right long finger and its flexor sheath;  Surgeon: Jeannine Escobar MD;  Location: [facility]    PERCUTANEOUS BIOPSY LIVER Right 9/1/2020    Procedure: NEEDLE BIOPSY, LIVER, PERCUTANEOUS;  Surgeon: Shelly Meyer MD;  [facility]    SINUS SURGERY  4/27/10       Social History:   reports that he has never smoked. He has never used smokeless tobacco. He reports current alcohol use. He reports that he does not use drugs.    Family History:  Family History   Problem Relation Age of Onset    Hypertension Father     Cardiovascular Maternal Grandfather     Heart Disease Maternal Grandfather     Allergies Brother        Medications:  Current Outpatient Medications   Medication Sig Dispense Refill    triamcinolone (KENALOG) 0.1 % external cream Apply topically 2 times daily 45 g 0      Allergies   Allergen Reactions    Sulfa Antibiotics Hives    Amoxicillin Rash

## 2023-10-23 NOTE — PROGRESS NOTES
Fadi is a 38 year old who is being evaluated via a billable video visit.      How would you like to obtain your AVS? MyChart  If the video visit is dropped, the invitation should be resent by: Text to cell phone: [phone]  Will anyone else be joining your video visit? No              Subjective   Fadi is a 38 year old, presenting for the following health issues:  Video Visit    HPI     Patient is interested in vasectomy.  He has 2 kids.      Review of Systems   Constitutional, HEENT, cardiovascular, pulmonary, gi and gu systems are negative, except as otherwise noted.      Objective           Vitals:  No vitals were obtained today due to virtual visit.    Physical Exam   GENERAL: Healthy, alert and no distress  EYES: Eyes grossly normal to inspection.  No discharge or erythema, or obvious scleral/conjunctival abnormalities.  RESP: No audible wheeze, cough, or visible cyanosis.  No visible retractions or increased work of breathing.    SKIN: Visible skin clear. No significant rash, abnormal pigmentation or lesions.  NEURO: Cranial nerves grossly intact.  Mentation and speech appropriate for age.  PSYCH: Mentation appears normal, affect normal/bright, judgement and insight intact, normal speech and appearance well-groomed.        Discussed    That vasectomy is permanent method of birth control.    That vasectomy can fail due to recanalization of the vas even many months/years later.    That he needs 2 negative sperm checks to be considered sterile    That there are other methods that are not permanent and also that the sperm can be frozen for later use.    How the technique is performed, risks of infection, bleeding, damage to the testes vessels and testes atrophy    Long term complication such as chronic and difficult to treat testes pain and questionable increase incidence of prostate cancer    That the procedure can be done at the clinic or hospital OR        Plan:    Stop Aspirin  Will schedule Vasectomy in the  Well-Child Checkup: 11 to 13 Years     Physical activity is key to lifelong good health. Encourage your child to find activities that he or she enjoys. Between ages 6 and 15, your child will grow and change a lot.  It’s important to keep having yearl Puberty is the stage when a child begins to develop sexually into an adult. It usually starts between 9 and 14 for girls, and between 12 and 16 for boys. Here is some of what you can expect when puberty begins:  · Acne and body odor.  Hormones that increase future        Video-Visit Details    Type of service:  Video Visit     Originating Location (pt. Location): Home    Distant Location (provider location):  On-site  Platform used for Video Visit: Lulú      Today, kids are less active and eat more junk food than ever before. Your child is starting to make choices about what to eat and how active to be. You can’t always have the final say, but you can help your child develop healthy habits.  Here are some tips: · Serve and encourage healthy foods. Your child is making more food decisions on his or her own. All foods have a place in a balanced diet. Fruits, vegetables, lean meats, and whole grains should be eaten every day.  Save less healthy foods—like [place]ian frie · If your child has a cell phone or portable music player, make sure these are used safely and responsibly. Do not allow your child to talk on the phone, text, or listen to music with headphones while he or she is riding a bike or walking outdoors.  Remind · Set limits for the use of cell phones, the computer, and the Internet. Remind your child that you can check the web browser history and cell phone logs to know how these devices are being used.  Use parental controls and passwords to block access to [employer] Here are some topics you, your child, and the healthcare provider may want to discuss during this visit:  · School performance. How is your child doing in school? Is homework finished on time? Does your child stay organized?  These are skills you can help w · Body changes in girls. Early in puberty, breasts begin to develop. One breast often starts to grow before the other. This is normal. Hair begins to grow in the pubic area, under the arms, and on the legs.  Around 2 years after breasts begin to grow, a gir · Limit “screen time” to 1 hour each day. This includes time spent watching TV, playing video games, using the computer, and texting. If your child has a TV, computer, or video game console in the bedroom, consider replacing it with a music player.  For man · TV, computer, and video games can agitate a child and make it hard to calm down for the night. Turn them off the at least an hour before bed. Instead, encourage your child to read before bed.   · If your child has a cell phone, make sure it’s turned off a · At this age, kids may start taking risks that could be dangerous to their health or well-being. Sometimes bad decisions stem from peer pressure. Other times, kids just don’t think ahead about what could happen.  Teach your child the importance of making g · Make sure your child understands that things he or she “says” on the Internet are never private. Posts made on websites like Facebook, [employer], and Twitter can be seen by people they weren’t intended for.  Posts can easily be misunderstood and can even ca Caplet                   Caplet       6-11 lbs                 1.25 ml  12-17 lbs               2.5 ml  18-23 lbs 48-59 lbs                                                      2 tsp                              2               1 tablet  60-71 lbs                                                     2&1/2 tsp            72-95 lbs Is concerned with prestige and popularity. Likes to belong to a group and be like others. Becomes quite faddish. Prefers to spend time on weekends with friends. Friendships may change due to different levels of maturity.    Becomes aware of sexual f

## 2023-10-23 NOTE — LETTER
10/23/2023         RE: Emir Angela  [address]  [facility] [address]        Dear Colleague,    Thank you for referring your patient, Emir Angela, to the [facility]. Please see a copy of my visit note below.    [facility] Dermatology Note    Encounter Date: Oct 23, 2023    Dermatology Problem List:  Seborrheic dermatitis: ketoconazole cream, tacrolimus ointment    ______________________________________    Impression/Plan:  1. Seborrheic dermatitis: predominantly facial. Will start topicals.  - ketoconazole cream, tacrolimus ointment    2. Wart: discussed cryotherapy, salicylic acid. Patient will be on feet >15 hrs tomorrow so defer cryotherapy today. To revisit at follow up.    3. Reassurance provided for benign lesions not treated today including cherry angiomata, solar lentigines, seborrheic keratoses, and banal-appearing melanocytic nevi.        Follow-up in 3 months.       Staff Involved:  Staff Only    René Kunz MD   of Dermatology  Department of Dermatology  [facility]      CC:   Chief Complaint   Patient presents with     Rash     Rash near nostrils that has been present for the past 5 years.  Has tried treatments with little success. Has tried Kenalog cream and hydrocortisone.       Wart     Wart on left middle toe.        History of Present Illness:  Mr. Emir Angela is a 38 year old male who presents as a new patient.    Spot on the face - present ~5 years  - got smaller and treated, now getting larger again  - prior hydrocortisone 2.5%, triamcinolone x3 weeks, OTC moisturizers  - occasionally itchy if very dry  - red dry patches on face    Left third toe wart - small - prior cryotherapy - didn't seem to help  - on feet x15 hours tomorrow    Labs:  N/A    Physical exam:  Vitals: There were no vitals taken for this visit.  GEN: This is a well developed, well-nourished male  in no acute distress, in a pleasant mood.    SKIN: Pond phototype II  - Full skin, which includes the head/face, both arms, chest, back, abdomen,both legs, genitalia and/or groin buttocks, digits and/or nails, was examined.  - verrucous papule on the left third toe  - Erythema and scaling on the nasolabial folds and alar crease  - There are dome shaped bright red papules on the head/neck, trunk, extremities.   - Multiple regular brown pigmented macules and papules are identified on the head/neck, trunk, extremities.   - Scattered brown macules on sun exposed areas.  - There are waxy stuck on tan to brown papules on the head/neck, trunk, extremities.  - No other lesions of concern on areas examined.     Past Medical History:   Past Medical History:   Diagnosis Date     Chronic nonallergic rhinitis 5/27/10 skin tests    all negative     Gastroesophageal reflux disease without esophagitis 10/6/2015     GERD (gastroesophageal reflux disease)      Past Surgical History:   Procedure Laterality Date     APPENDECTOMY  2004     ESOPHAGOSCOPY, GASTROSCOPY, DUODENOSCOPY (EGD), COMBINED N/A 11/9/2018    Procedure: COMBINED ESOPHAGOSCOPY, GASTROSCOPY, DUODENOSCOPY (EGD), BIOPSY SINGLE OR MULTIPLE;  Surgeon: Remy Vigil MD;  Location:  GI     IR LIVER BIOPSY PERCUTANEOUS  9/1/2020     IRRIGATION AND DEBRIDEMENT HAND, COMBINED Right 5/20/2020    Procedure: Irrigation and debridement of the right long finger and its flexor sheath;  Surgeon: Jeannine Escobar MD;  Location: [facility] OR     PERCUTANEOUS BIOPSY LIVER Right 9/1/2020    Procedure: NEEDLE BIOPSY, LIVER, PERCUTANEOUS;  Surgeon: Shelly Meyer MD;  Location: [facility] OR     SINUS SURGERY  4/27/10       Social History:   reports that he has never smoked. He has never used smokeless tobacco. He reports current alcohol use. He reports that he does not use drugs.    Family History:  Family History   Problem Relation Age of Onset     Hypertension Father       Cardiovascular Maternal Grandfather      Heart Disease Maternal Grandfather      Allergies Brother        Medications:  Current Outpatient Medications   Medication Sig Dispense Refill     triamcinolone (KENALOG) 0.1 % external cream Apply topically 2 times daily 45 g 0     Allergies   Allergen Reactions     Sulfa Antibiotics Hives     Amoxicillin Rash                 Again, thank you for allowing me to participate in the care of your patient.        Sincerely,        René Kunz MD

## 2023-10-23 NOTE — NURSING NOTE
Emir Angela's goals for this visit include:   Chief Complaint   Patient presents with    Rash     Rash near nostrils that has been present for the past 5 years.  Has tried treatments with little success. Has tried Kenalog cream and hydrocortisone.      Wart     Wart on left middle toe.       He requests these members of his care team be copied on today's visit information:     PCP: Hayder Reis    Referring Provider:  No referring provider defined for this encounter.    There were no vitals taken for this visit.    Do you need any medication refills at today's visit?     Karime Dougherty on 10/23/2023 at 7:31 AM

## 2023-10-23 NOTE — PROGRESS NOTES
[facility] Dermatology Note    Encounter Date: Oct 23, 2023    Dermatology Problem List:  1.     ______________________________________    Impression/Plan:  1.       Follow-up in ***.       Staff Involved:  Staff Only      CC:   No chief complaint on file.      History of Present Illness:  Mr. Emir Angela is a 38 year old male who presents as a new patient.    ***    Labs:  ***    Physical exam:  Vitals: There were no vitals taken for this visit.  GEN: This is a well developed, well-nourished male in no acute distress, in a pleasant mood.    SKIN: Pond phototype ***  - {Skin Exam:655199}  - No other lesions of concern on areas examined.     Past Medical History:   Past Medical History:   Diagnosis Date    Chronic nonallergic rhinitis 5/27/10 skin tests    all negative    Gastroesophageal reflux disease without esophagitis 10/6/2015    GERD (gastroesophageal reflux disease)      Past Surgical History:   Procedure Laterality Date    APPENDECTOMY  2004    ESOPHAGOSCOPY, GASTROSCOPY, DUODENOSCOPY (EGD), COMBINED N/A 11/9/2018    Procedure: COMBINED ESOPHAGOSCOPY, GASTROSCOPY, DUODENOSCOPY (EGD), BIOPSY SINGLE OR MULTIPLE;  Surgeon: Remy Vigil MD;  Location:  GI    IR LIVER BIOPSY PERCUTANEOUS  9/1/2020    IRRIGATION AND DEBRIDEMENT HAND, COMBINED Right 5/20/2020    Procedure: Irrigation and debridement of the right long finger and its flexor sheath;  Surgeon: Jeannine Escobar MD;  Location: [facility] OR    PERCUTANEOUS BIOPSY LIVER Right 9/1/2020    Procedure: NEEDLE BIOPSY, LIVER, PERCUTANEOUS;  Surgeon: Shelly Meyer MD;  Location: [facility] OR    SINUS SURGERY  4/27/10       Social History:   reports that he has never smoked. He has never used smokeless tobacco. He reports current alcohol use. He reports that he does not use drugs.    Family History:  Family History   Problem Relation Age of Onset    Hypertension Father     Cardiovascular Maternal Grandfather     Heart  Disease Maternal Grandfather     Allergies Brother        Medications:  Current Outpatient Medications   Medication Sig Dispense Refill    triamcinolone (KENALOG) 0.1 % external cream Apply topically 2 times daily 45 g 0     Allergies   Allergen Reactions    Sulfa Antibiotics Hives    Amoxicillin Rash

## 2023-10-23 NOTE — PATIENT INSTRUCTIONS
Please call [phone] if you need to schedule your vasectomy, reschedule this appointment or if you have any questions.     Preparation for Vasectomy:  Eat and drink normally prior to appointment.   Shave the hair away from the base of your penis and around your testicles.  Wear snug underwear the day of the vasectomy to support your testicles.  Do not take aspirin, ibuprofen, advil, motrin, aleve products one week prior to your vasectomy.        General Vasectomy Information    Vasectomy is a surgery.  If it is successful, it will be impossible for you to ever father children.  The following information regards the male sterilization done by an operation called a vasectomy.  This is done in the physician's office.    The operation done to sterilize the male is easier, safer and much less expensive than the operation done to sterilize the woman.    Sterilization should be considered permanent.  For most males, once the operation is done, it can never me undone.  There are attempts made occasionally to reconnect the tubes that have been cut during the procedure, but this is very difficult and expensive and works only about 50-70% of the time.  In order for any of the physicians in our clinic to do a vasectomy, we require that you consider this a permanent form a sterilization.    A vasectomy can be done at any time, but it is best to think about having it done when you can take at least one day off from work and any excess activities.    Your decision to have a vasectomy should only be made with the following facts clear in mind.    1. First, a vasectomy is only one of several means of birth control.  Many form of temporary contraception are available.  If you have any questions about other methods, please discuss this with your physician.    2. A vasectomy may be unsuccessful in approximately one out of 1000 couples per year.  This occurs when the tubes which are cut during the procedure reconnect themselves.   Sterility cannot be guaranteed.    3. You should be aware that it is the current belief of the medical profession that a vasectomy procedure does not alter a male physically, physiologically or sexually.  Because each person is a unique individual, there is always the possibility of an adverse phychiatric reaction.  This can be best avoided by being very comfortable in your own mind that you want to have this done, and that you do not want to father any children in the future.  If this is not clear in your mind, this should be further discussed with your physician.    4. You will not notice a change in the volume of your ejaculate since sperm is a very small amount of the semen and it is only the sperm that is stopped from entering the ejaculate after a vasectomy.  Your prostate and seminal vesicle glands really supply most of the semen and this is not at all decreased after a vasectomy.  Also there is no effect on the male hormones.    5. You do not become sterile immediately following a vasectomy due to the fact that there is still sperm remaining in your system that must be eliminated by ejaculation.  For this reason, your sexual partner could still become pregnant for a period of time following the vasectomy operation.  It is necessary that contraceptive measures be used until you receive confirmation from your physician that you are sterile.  It takes approximately 12 ejaculations to clear the semen of sperm, but this can differ in different men.  For this reason, it is very important that your semen be checked for sperm before you are considered sterile, and this should be done approximately 12 weeks after your vasectomy.      6. Vasectomy has risks and benefits.  Among the risks are possible complications resulting from the procedure.  These risks include but are not limited to:   A.  Bleeding, infection, or hematoma occuring during or in the recovery period   from the procedure.   B.  Sperm granuloma or a  small pea to walnut sized lump which is a collection of   scar tissue and sperm in your scrotal sack and remains permanently   C.  There may be an increased risk of prostate cancer although the data is   unclear.

## 2023-10-24 ENCOUNTER — TELEPHONE (OUTPATIENT)
Dept: DERMATOLOGY | Facility: CLINIC | Age: 38
End: 2023-10-24
Payer: COMMERCIAL

## 2023-10-24 NOTE — TELEPHONE ENCOUNTER
[facility] Call Center    Phone Message    May a detailed message be left on voicemail: yes     Reason for Call: Other: Pt is calling to schedule follow-up. Per note from Rea around 1/23/24. Next available is June. Please call to schedule. Prefers morning if possible.       Action Taken: Message routed to:  [facility] Derm    Travel Screening: Not Applicable

## 2023-10-25 NOTE — TELEPHONE ENCOUNTER
I left a detailed VM with Fadi informing him that I booked his 3 month follow up appointment with Dr. Kunz on 1/29 at 7 am. Advised patient to call the clinic or send a MyChart if appt date/time does not work.     Bisi RIVERA

## 2023-10-31 ENCOUNTER — TELEPHONE (OUTPATIENT)
Dept: UROLOGY | Facility: CLINIC | Age: 38
End: 2023-10-31
Payer: COMMERCIAL

## 2023-10-31 NOTE — TELEPHONE ENCOUNTER
Patient left office VM 10/30/23 3:20pm asking for call back to schedule a vasectomy.  Keira Arevalo, CMA

## 2023-12-06 ENCOUNTER — VIRTUAL VISIT (OUTPATIENT)
Dept: FAMILY MEDICINE | Facility: CLINIC | Age: 38
End: 2023-12-06
Payer: COMMERCIAL

## 2023-12-06 DIAGNOSIS — J01.10 SUBACUTE FRONTAL SINUSITIS: Primary | ICD-10-CM

## 2023-12-06 PROCEDURE — 99213 OFFICE O/P EST LOW 20 MIN: CPT | Mod: VID | Performed by: INTERNAL MEDICINE

## 2023-12-06 RX ORDER — AZITHROMYCIN 250 MG/1
TABLET, FILM COATED ORAL
Qty: 6 TABLET | Refills: 0 | Status: SHIPPED | OUTPATIENT
Start: 2023-12-06 | End: 2023-12-11

## 2023-12-06 NOTE — PROGRESS NOTES
09-Dec-2020 Fadi is a 38 year old who is being evaluated via a billable video visit.      How would you like to obtain your AVS? MyChart  If the video visit is dropped, the invitation should be resent by: Text to cell phone: [phone]  Will anyone else be joining your video visit? No          Assessment & Plan     Subacute frontal sinusitis    Given LOS will Rx.  Risks and SE discussed.  Add OTC Flonase as well.  Supportive care as already doing.  - azithromycin (ZITHROMAX) 250 MG tablet  Dispense: 6 tablet; Refill: 0        Hayder Reis MD  [facility]    Subjective   Fadi is a 38 year old, presenting for the following health issues:  URI (Congestion, cold sx 3 weeks, sinus pressure, tested 3-4 times negative covid; otc meds)      HPI     Pt started with a cough 23 days ago. 7-10 days later started to subside but over the last 2 weeks sx have recurred.  He notes more frontal headache, cheek and forehead pain.  Cough is mostly in the mornings.    Tested for Covid multiple times (negative).      Has taken Tylenol and Sudafed        Review of Systems         Objective           Vitals:  No vitals were obtained today due to virtual visit.    Physical Exam   GENERAL: Healthy, alert and no distress  EYES: Eyes grossly normal to inspection.  No discharge or erythema, or obvious scleral/conjunctival abnormalities.  RESP: No audible wheeze, cough, or visible cyanosis.  No visible retractions or increased work of breathing.    SKIN: Visible skin clear. No significant rash, abnormal pigmentation or lesions.  NEURO: Cranial nerves grossly intact.  Mentation and speech appropriate for age.  PSYCH: Mentation appears normal, affect normal/bright, judgement and insight intact, normal speech and appearance well-groomed.            Video-Visit Details    Type of service:  Video Visit     Originating Location (pt. Location): Home    Distant Location (provider location):  On-site  Platform used for Video Visit:  Doximity

## 2023-12-22 ENCOUNTER — APPOINTMENT (OUTPATIENT)
Dept: LAB | Facility: CLINIC | Age: 38
End: 2023-12-22
Payer: COMMERCIAL

## 2023-12-22 LAB
FLUAV RNA SPEC QL NAA+PROBE: NEGATIVE
FLUBV RNA RESP QL NAA+PROBE: NEGATIVE
RSV RNA SPEC NAA+PROBE: POSITIVE
SARS-COV-2 RNA RESP QL NAA+PROBE: NEGATIVE

## 2023-12-22 PROCEDURE — 87637 SARSCOV2&INF A&B&RSV AMP PRB: CPT

## 2024-01-08 ENCOUNTER — VIRTUAL VISIT (OUTPATIENT)
Dept: FAMILY MEDICINE | Facility: CLINIC | Age: 39
End: 2024-01-08
Payer: COMMERCIAL

## 2024-01-08 DIAGNOSIS — B33.8 RSV INFECTION: Primary | ICD-10-CM

## 2024-01-08 PROCEDURE — 99213 OFFICE O/P EST LOW 20 MIN: CPT | Mod: 95 | Performed by: INTERNAL MEDICINE

## 2024-01-08 RX ORDER — BENZONATATE 200 MG/1
200 CAPSULE ORAL 3 TIMES DAILY PRN
Qty: 30 CAPSULE | Refills: 0 | Status: SHIPPED | OUTPATIENT
Start: 2024-01-08 | End: 2024-01-23

## 2024-01-08 NOTE — PROGRESS NOTES
Fadi is a 38 year old who is being evaluated via a billable video visit.      How would you like to obtain your AVS? MyChart  If the video visit is dropped, the invitation should be resent by: Text to cell phone: [phone]  Will anyone else be joining your video visit? No          Assessment & Plan     RSV infection    - benzonatate (TESSALON) 200 MG capsule  Dispense: 30 capsule; Refill: 0        Supportive care discussed.  Humidifier at bedside. Tessalon prn.  If sx persist more than 5 weeks can get CXR      Hayder eRis MD  [facility]    Subjective   Fadi is a 38 year old, presenting for the following health issues:  Follow Up (Cough and wheezing)      HPI     Pt tested RSV positive 12/22. He has a nagging morning cough.  Has some nasal congestion also.    He also notes some intermittent wheezing in the mornings.    No fevers.  Cough mostly in AM.          Review of Systems         Objective    Vitals - Patient Reported  SpO2 (Patient Reported): 99  Temperature (Patient Reported): 98.6  F (37  C)  Pulse (Patient Reported): 85      Vitals:  No vitals were obtained today due to virtual visit.    Physical Exam   GENERAL: Healthy, alert and no distress  EYES: Eyes grossly normal to inspection.  No discharge or erythema, or obvious scleral/conjunctival abnormalities.  RESP: No audible wheeze, cough, or visible cyanosis.  No visible retractions or increased work of breathing.    SKIN: Visible skin clear. No significant rash, abnormal pigmentation or lesions.  NEURO: Cranial nerves grossly intact.  Mentation and speech appropriate for age.  PSYCH: Mentation appears normal, affect normal/bright, judgement and insight intact, normal speech and appearance well-groomed.                Video-Visit Details    Type of service:  Video Visit     Originating Location (pt. Location): Home    Distant Location (provider location):  Off-site  Platform used for Video Visit: [facility]

## 2024-01-23 ENCOUNTER — ANCILLARY PROCEDURE (OUTPATIENT)
Dept: GENERAL RADIOLOGY | Facility: CLINIC | Age: 39
End: 2024-01-23
Attending: PHYSICIAN ASSISTANT
Payer: COMMERCIAL

## 2024-01-23 ENCOUNTER — OFFICE VISIT (OUTPATIENT)
Dept: FAMILY MEDICINE | Facility: CLINIC | Age: 39
End: 2024-01-23
Payer: COMMERCIAL

## 2024-01-23 VITALS
SYSTOLIC BLOOD PRESSURE: 108 MMHG | RESPIRATION RATE: 18 BRPM | HEART RATE: 84 BPM | OXYGEN SATURATION: 98 % | TEMPERATURE: 97.9 F | BODY MASS INDEX: 21.4 KG/M2 | DIASTOLIC BLOOD PRESSURE: 76 MMHG | HEIGHT: 70 IN | WEIGHT: 149.5 LBS

## 2024-01-23 DIAGNOSIS — R05.8 POST-VIRAL COUGH SYNDROME: ICD-10-CM

## 2024-01-23 DIAGNOSIS — R05.8 POST-VIRAL COUGH SYNDROME: Primary | ICD-10-CM

## 2024-01-23 PROCEDURE — 71046 X-RAY EXAM CHEST 2 VIEWS: CPT | Mod: TC | Performed by: RADIOLOGY

## 2024-01-23 PROCEDURE — 99213 OFFICE O/P EST LOW 20 MIN: CPT | Performed by: PHYSICIAN ASSISTANT

## 2024-01-23 ASSESSMENT — PAIN SCALES - GENERAL: PAINLEVEL: NO PAIN (0)

## 2024-01-23 NOTE — PROGRESS NOTES
Assessment & Plan     (R05.8) Post-viral cough syndrome  (primary encounter diagnosis)  Comment:   Plan: XR Chest 2 Views        Etiology of post viral bronchitis discussed today. Treatment options, medication side effects and expected course of recovery (up to 6-8 weeks) reviewed. If any worsening of symptoms, please contact the clinical team sooner, otherwise follow by Ran, next step may be trial of PO steroid, repeat EGD, chest CT or PFTs                  Subjective   Fadi is a 38 year old, presenting for the following health issues:  Follow Up (Pt got tested RSV over a month ago. /Cough for the past over 5 weeks)      1/23/2024     3:38 PM   Additional Questions   Roomed by Blanka OSPINA   Accompanied by self     History of Present Illness       Reason for visit:  Rsv fu    He eats 2-3 servings of fruits and vegetables daily.He consumes 0 sweetened beverage(s) daily.He exercises with enough effort to increase his heart rate 20 to 29 minutes per day.  He exercises with enough effort to increase his heart rate 5 days per week.   He is taking medications regularly.         Concern - cough after RSV infection  Onset: 12/2023  Description: dry nagging cough and AM wheezing, no SOB, minor clear production  Intensity: mild  Progression of Symptoms:  waxing and waning  Accompanying Signs & Symptoms: mild congestion, known hx of allergies and GERD  Previous history of similar problem: no  Precipitating factors:        Worsened by: waking up, worse in AM, better as the day goes on  Alleviating factors:        Improved by: NA  Therapies tried and outcome: None            Objective    There were no vitals taken for this visit.  There is no height or weight on file to calculate BMI.  Physical Exam   GENERAL: alert and no distress  EYES: Eyes grossly normal to inspection, PERRL and conjunctivae and sclerae normal  HENT: ear canals and TM's normal, nose and mouth without ulcers or lesions  NECK: no adenopathy, no asymmetry,  masses, or scars  RESP: lungs clear to auscultation - no rales, rhonchi or wheezes  CV: regular rate and rhythm, normal S1 S2, no S3 or S4, no murmur, click or rub, no peripheral edema            Signed Electronically by: Elkin Robledo PA-C

## 2024-01-30 ENCOUNTER — TELEPHONE (OUTPATIENT)
Dept: UROLOGY | Facility: CLINIC | Age: 39
End: 2024-01-30
Payer: COMMERCIAL

## 2024-01-30 NOTE — TELEPHONE ENCOUNTER
[facility] Call Center    Phone Message    May a detailed message be left on voicemail: no     Reason for Call: Patient called wanting to know if he needs to bring someone with him after his vasectomy procedure. Patient would like [employer] message to notify him please and thank you.    Action Taken: [facility]    Travel Screening: Not Applicable

## 2024-01-31 NOTE — TELEPHONE ENCOUNTER
My chart message sent to patient advising no need for  as procedure is performed with local anesthetic.  Keira Arevalo, [facility]

## 2024-02-09 ENCOUNTER — OFFICE VISIT (OUTPATIENT)
Dept: UROLOGY | Facility: CLINIC | Age: 39
End: 2024-02-09
Payer: COMMERCIAL

## 2024-02-09 VITALS — OXYGEN SATURATION: 100 % | SYSTOLIC BLOOD PRESSURE: 136 MMHG | DIASTOLIC BLOOD PRESSURE: 92 MMHG | HEART RATE: 79 BPM

## 2024-02-09 DIAGNOSIS — Z30.2 ENCOUNTER FOR STERILIZATION: Primary | ICD-10-CM

## 2024-02-09 PROCEDURE — 99000 SPECIMEN HANDLING OFFICE-LAB: CPT | Performed by: UROLOGY

## 2024-02-09 PROCEDURE — 55250 REMOVAL OF SPERM DUCT(S): CPT | Performed by: UROLOGY

## 2024-02-09 PROCEDURE — 88302 TISSUE EXAM BY PATHOLOGIST: CPT | Performed by: PATHOLOGY

## 2024-02-09 NOTE — PROGRESS NOTES
Post Vasectomy education documentation:    To improve patient experience and health outcomes, patient was educated Post vasectomy .    Teaching method:  Patient received written materials handout and general discussion/verbal explanation. Patient was provided with post vasectomy instruction sheet, post vasectomy semen collection sheet and x2 labeled sterile specimen cups.     All questions answered. Patient acknowledged understanding on the education.    Pt stood up and felt light headed. Juice and water given. VS taken and stable. Pt left ambulatory    Tata HARGROVE RN Urology 2/9/2024 8:43 AM

## 2024-02-12 LAB
PATH REPORT.COMMENTS IMP SPEC: NORMAL
PATH REPORT.COMMENTS IMP SPEC: NORMAL
PATH REPORT.FINAL DX SPEC: NORMAL
PATH REPORT.GROSS SPEC: NORMAL
PATH REPORT.MICROSCOPIC SPEC OTHER STN: NORMAL
PATH REPORT.RELEVANT HX SPEC: NORMAL
PHOTO IMAGE: NORMAL

## 2024-03-22 ENCOUNTER — TRANSFERRED RECORDS (OUTPATIENT)
Dept: HEALTH INFORMATION MANAGEMENT | Facility: CLINIC | Age: 39
End: 2024-03-22
Payer: COMMERCIAL

## 2024-04-03 ENCOUNTER — TRANSFERRED RECORDS (OUTPATIENT)
Dept: HEALTH INFORMATION MANAGEMENT | Facility: CLINIC | Age: 39
End: 2024-04-03
Payer: COMMERCIAL

## 2024-04-10 ENCOUNTER — TRANSFERRED RECORDS (OUTPATIENT)
Dept: HEALTH INFORMATION MANAGEMENT | Facility: CLINIC | Age: 39
End: 2024-04-10
Payer: COMMERCIAL

## 2024-05-08 ENCOUNTER — LAB (OUTPATIENT)
Dept: LAB | Facility: CLINIC | Age: 39
End: 2024-05-08
Payer: COMMERCIAL

## 2024-05-08 DIAGNOSIS — Z30.2 ENCOUNTER FOR STERILIZATION: ICD-10-CM

## 2024-05-08 LAB — SEMEN ANALYSIS P VAS PNL: NORMAL

## 2024-05-08 PROCEDURE — 89321 SEMEN ANAL SPERM DETECTION: CPT

## 2024-05-15 ENCOUNTER — LAB (OUTPATIENT)
Dept: LAB | Facility: CLINIC | Age: 39
End: 2024-05-15
Payer: COMMERCIAL

## 2024-05-15 DIAGNOSIS — Z30.2 ENCOUNTER FOR STERILIZATION: ICD-10-CM

## 2024-05-15 LAB — SEMEN ANALYSIS P VAS PNL: NORMAL

## 2024-05-15 PROCEDURE — 89321 SEMEN ANAL SPERM DETECTION: CPT

## 2024-06-30 ENCOUNTER — HEALTH MAINTENANCE LETTER (OUTPATIENT)
Age: 39
End: 2024-06-30

## 2024-07-09 ENCOUNTER — TRANSFERRED RECORDS (OUTPATIENT)
Dept: HEALTH INFORMATION MANAGEMENT | Facility: CLINIC | Age: 39
End: 2024-07-09

## 2024-07-16 ENCOUNTER — OFFICE VISIT (OUTPATIENT)
Dept: FAMILY MEDICINE | Facility: CLINIC | Age: 39
End: 2024-07-16
Payer: COMMERCIAL

## 2024-07-16 VITALS
TEMPERATURE: 97.8 F | HEART RATE: 89 BPM | RESPIRATION RATE: 16 BRPM | WEIGHT: 152 LBS | SYSTOLIC BLOOD PRESSURE: 117 MMHG | BODY MASS INDEX: 21.76 KG/M2 | HEIGHT: 70 IN | OXYGEN SATURATION: 100 % | DIASTOLIC BLOOD PRESSURE: 75 MMHG

## 2024-07-16 DIAGNOSIS — Z13.220 LIPID SCREENING: ICD-10-CM

## 2024-07-16 DIAGNOSIS — R19.7 DIARRHEA OF PRESUMED INFECTIOUS ORIGIN: ICD-10-CM

## 2024-07-16 DIAGNOSIS — Z00.00 ROUTINE GENERAL MEDICAL EXAMINATION AT A HEALTH CARE FACILITY: Primary | ICD-10-CM

## 2024-07-16 DIAGNOSIS — Z11.4 SCREENING FOR HIV (HUMAN IMMUNODEFICIENCY VIRUS): ICD-10-CM

## 2024-07-16 DIAGNOSIS — H91.93 HEARING DIFFICULTY, BILATERAL: ICD-10-CM

## 2024-07-16 DIAGNOSIS — H93.12 TINNITUS, LEFT: ICD-10-CM

## 2024-07-16 DIAGNOSIS — K52.9 GASTROENTERITIS: ICD-10-CM

## 2024-07-16 LAB
ADV 40+41 DNA STL QL NAA+NON-PROBE: NEGATIVE
ASTRO TYP 1-8 RNA STL QL NAA+NON-PROBE: NEGATIVE
C CAYETANENSIS DNA STL QL NAA+NON-PROBE: NEGATIVE
CAMPYLOBACTER DNA SPEC NAA+PROBE: NEGATIVE
CRYPTOSP DNA STL QL NAA+NON-PROBE: NEGATIVE
E COLI O157 DNA STL QL NAA+NON-PROBE: ABNORMAL
E HISTOLYT DNA STL QL NAA+NON-PROBE: NEGATIVE
EAEC ASTA GENE ISLT QL NAA+PROBE: POSITIVE
EC STX1+STX2 GENES STL QL NAA+NON-PROBE: NEGATIVE
EPEC EAE GENE STL QL NAA+NON-PROBE: POSITIVE
ERYTHROCYTE [DISTWIDTH] IN BLOOD BY AUTOMATED COUNT: 12.3 % (ref 10–15)
ETEC LTA+ST1A+ST1B TOX ST NAA+NON-PROBE: NEGATIVE
G LAMBLIA DNA STL QL NAA+NON-PROBE: NEGATIVE
HCT VFR BLD AUTO: 43.9 % (ref 40–53)
HGB BLD-MCNC: 14.7 G/DL (ref 13.3–17.7)
MCH RBC QN AUTO: 29.9 PG (ref 26.5–33)
MCHC RBC AUTO-ENTMCNC: 33.5 G/DL (ref 31.5–36.5)
MCV RBC AUTO: 89 FL (ref 78–100)
NOROVIRUS GI+II RNA STL QL NAA+NON-PROBE: NEGATIVE
P SHIGELLOIDES DNA STL QL NAA+NON-PROBE: NEGATIVE
PLATELET # BLD AUTO: 239 10E3/UL (ref 150–450)
RBC # BLD AUTO: 4.91 10E6/UL (ref 4.4–5.9)
RVA RNA STL QL NAA+NON-PROBE: NEGATIVE
SALMONELLA SP RPOD STL QL NAA+PROBE: NEGATIVE
SAPO I+II+IV+V RNA STL QL NAA+NON-PROBE: NEGATIVE
SHIGELLA SP+EIEC IPAH ST NAA+NON-PROBE: NEGATIVE
V CHOLERAE DNA SPEC QL NAA+PROBE: NEGATIVE
VIBRIO DNA SPEC NAA+PROBE: NEGATIVE
WBC # BLD AUTO: 3.9 10E3/UL (ref 4–11)
Y ENTEROCOL DNA STL QL NAA+PROBE: NEGATIVE

## 2024-07-16 PROCEDURE — 99213 OFFICE O/P EST LOW 20 MIN: CPT | Mod: 25 | Performed by: PHYSICIAN ASSISTANT

## 2024-07-16 PROCEDURE — 80053 COMPREHEN METABOLIC PANEL: CPT | Performed by: PHYSICIAN ASSISTANT

## 2024-07-16 PROCEDURE — 85027 COMPLETE CBC AUTOMATED: CPT | Performed by: PHYSICIAN ASSISTANT

## 2024-07-16 PROCEDURE — 36415 COLL VENOUS BLD VENIPUNCTURE: CPT | Performed by: PHYSICIAN ASSISTANT

## 2024-07-16 PROCEDURE — 87389 HIV-1 AG W/HIV-1&-2 AB AG IA: CPT | Performed by: PHYSICIAN ASSISTANT

## 2024-07-16 PROCEDURE — 80061 LIPID PANEL: CPT | Performed by: PHYSICIAN ASSISTANT

## 2024-07-16 PROCEDURE — 99396 PREV VISIT EST AGE 40-64: CPT | Performed by: PHYSICIAN ASSISTANT

## 2024-07-16 PROCEDURE — V5008 HEARING SCREENING: HCPCS | Performed by: PHYSICIAN ASSISTANT

## 2024-07-16 PROCEDURE — 87507 IADNA-DNA/RNA PROBE TQ 12-25: CPT | Performed by: PHYSICIAN ASSISTANT

## 2024-07-16 SDOH — HEALTH STABILITY: PHYSICAL HEALTH: ON AVERAGE, HOW MANY DAYS PER WEEK DO YOU ENGAGE IN MODERATE TO STRENUOUS EXERCISE (LIKE A BRISK WALK)?: 5 DAYS

## 2024-07-16 ASSESSMENT — SOCIAL DETERMINANTS OF HEALTH (SDOH): HOW OFTEN DO YOU GET TOGETHER WITH FRIENDS OR RELATIVES?: THREE TIMES A WEEK

## 2024-07-16 ASSESSMENT — PAIN SCALES - GENERAL: PAINLEVEL: NO PAIN (0)

## 2024-07-16 NOTE — PROGRESS NOTES
Preventive Care Visit  [facility] SIDNEY Galan PA-C, Family Medicine  Jul 16, 2024      Assessment & Plan   Problem List Items Addressed This Visit    None  Visit Diagnoses       Routine general medical examination at a health care facility    -  Primary    Screening for HIV (human immunodeficiency virus)        Relevant Orders    HIV Antigen Antibody Combo    Diarrhea of presumed infectious origin        Relevant Orders    Enteric Bacteria and Virus Panel by YARIEL Stool    Comprehensive metabolic panel (BMP + Alb, Alk Phos, ALT, AST, Total. Bili, TP)    CBC with platelets    Hearing difficulty, bilateral        Relevant Orders    HEARING SCREENING (Completed)    Adult ENT  Referral    Lipid screening        Relevant Orders    Lipid panel reflex to direct LDL Non-fasting    Tinnitus, left        Relevant Orders    Adult ENT  Referral    Gastroenteritis               Diarrhea- most likely infectious origin . No BM today yet, yesterday stools were more solid.   Stool culture was ordered.   Abdominal pain has resolved.     Hydrate well-Pedialyte with water     Hearing concern- hearing test was normal, exam as well.  Patient will follow up with ENT for further evaluation as needed.     Patient has been advised of split billing requirements and indicates understanding: Yes       Counseling  Appropriate preventive services were discussed with this patient, including applicable screening as appropriate for fall prevention, nutrition, physical activity, Tobacco-use cessation, weight loss and cognition.  Checklist reviewing preventive services available has been given to the patient.  Reviewed patient's diet, addressing concerns and/or questions.           Jennifer Aponte is a 39 year old, presenting for the following:  Physical        7/16/2024     9:46 AM   Additional Questions   Roomed by octavia   Accompanied by self        Health Care Directive  Patient does not  "have a Health Care Directive or Living Will: Discussed advance care planning with patient; however, patient declined at this time.    HPI        Patient would like to discuss hearing concerns; he is unsure that it is earwax/loss of hearing. States the left ear is worse than the right ear.    Patient would also like to discuss recent stomach pain; patient stated that on Saturday, 07/13 He noticed very painful stomach pain accompanied by lose stool for three days. Patient states that since then his symptoms have improved, But he is having difficulty passing gas     Patient also wanted to note that he has had liver problems in the past due to taking a large amount of antibiotics from a finger accident and is concerned that it could be linked to kidney stones.    Patient stated that he did consume raspberries from his garden without washing them and that he did consume old food that was a week old.           Concern - muffled hearing  Onset: 1 year  Description: muffled hearing  Intensity: mild  Progression of Symptoms:  same  Accompanying Signs & Symptoms: h/o tinnitus in the left ear  Previous history of similar problem: yes  Precipitating factors:        Worsened by: noisy environment   Alleviating factors:        Improved by:   Therapies tried and outcome:  none     Abdominal Pain    Duration: 3 days . Improved yesterday. Yesterday patient had 1 BM stool was \"stringy\". Today patient has not had BM yet and no urge.   Description (location/character/radiation): generalized lower abdomen bilaterally       Associated flank pain: None  Intensity:  moderate, severe  Accompanying signs and symptoms:        Fever/Chills: YES        Gas/Bloating: YES       Nausea/vomitting: YES- nausea and diarrhea        Diarrhea: YES- had diarrhea for 3 days with nausea        Dysuria or Hematuria: no   History (previous similar pain/trauma/previous testing): patient ate sone unwashed berries form his garden and then developed diarrhea and " abdominal cramping. Patient also gets frequent GERD for acidic, greasy, carbonates, caffeine, alcohol.  Patient had appendectomy in the past.   Precipitating or alleviating factors:       Pain worse with eating/BM/urination: after passing diarrhea pain has subsided       Pain relieved by BM: YES  Therapies tried and outcome: None  LMP:  not applicable        7/16/2024   General Health   How would you rate your overall physical health? Excellent   Feel stress (tense, anxious, or unable to sleep) Not at all            7/16/2024   Nutrition   Three or more servings of calcium each day? Yes   Diet: Regular (no restrictions)   How many servings of fruit and vegetables per day? 4 or more   How many sweetened beverages each day? 0-1            7/16/2024   Exercise   Days per week of moderate/strenous exercise 5 days            7/16/2024   Social Factors   Frequency of gathering with friends or relatives Three times a week   Worry food won't last until get money to buy more No   Food not last or not have enough money for food? No   Do you have housing? (Housing is defined as stable permanent housing and does not include staying ouside in a car, in a tent, in an abandoned building, in an overnight shelter, or couch-surfing.) Yes   Are you worried about losing your housing? No   Lack of transportation? No   Unable to get utilities (heat,electricity)? No            7/16/2024   Dental   Dentist two times every year? Yes            7/16/2024   TB Screening   Were you born outside of the [place]? No                  7/16/2024   Substance Use   Alcohol more than 3/day or more than 7/wk No   Do you use any other substances recreationally? No        Social History     Tobacco Use    Smoking status: Never    Smokeless tobacco: Never   Vaping Use    Vaping status: Never Used   Substance Use Topics    Alcohol use: Yes     Alcohol/week: 0.0 standard drinks of alcohol     Comment: 0-6 per week     Drug use: No             7/16/2024   One time  HIV Screening   Previous HIV test? No          7/16/2024   STI Screening   New sexual partner(s) since last STI/HIV test? No      ASCVD Risk   The ASCVD Risk score (Edis DK, et al., 2019) failed to calculate for the following reasons:    Cannot find a previous HDL lab    Cannot find a previous total cholesterol lab       Reviewed and updated as needed this visit by Provider   Tobacco  Allergies  Meds  Problems  Med Hx  Surg Hx  Fam Hx            Patient Active Problem List   Diagnosis    Chronic nonallergic rhinitis    GERD (gastroesophageal reflux disease)     Past Surgical History:   Procedure Laterality Date    APPENDECTOMY  2004    ESOPHAGOSCOPY, GASTROSCOPY, DUODENOSCOPY (EGD), COMBINED N/A 11/9/2018    Procedure: COMBINED ESOPHAGOSCOPY, GASTROSCOPY, DUODENOSCOPY (EGD), BIOPSY SINGLE OR MULTIPLE;  Surgeon: Remy Vigil MD;  Location: [facility]    IR LIVER BIOPSY PERCUTANEOUS  9/1/2020    IRRIGATION AND DEBRIDEMENT HAND, COMBINED Right 5/20/2020    Procedure: Irrigation and debridement of the right long finger and its flexor sheath;  Surgeon: Jeannine Escobar MD;  Location: [facility]    PERCUTANEOUS BIOPSY LIVER Right 9/1/2020    Procedure: NEEDLE BIOPSY, LIVER, PERCUTANEOUS;  Surgeon: Shelly Meyer MD;  Location: [facility]    SINUS SURGERY  4/27/10       Social History     Tobacco Use    Smoking status: Never    Smokeless tobacco: Never   Substance Use Topics    Alcohol use: Yes     Alcohol/week: 0.0 standard drinks of alcohol     Comment: 0-6 per week      Family History   Problem Relation Age of Onset    Hypertension Father     Cardiovascular Maternal Grandfather     Heart Disease Maternal Grandfather     Allergies Brother          No current outpatient medications on file.     Allergies   Allergen Reactions    Sulfa Antibiotics Hives    Amoxicillin Rash         Review of Systems  Constitutional, HEENT, cardiovascular, pulmonary, gi and gu systems are negative, except as  "otherwise noted.     Objective    Exam  /75 (BP Location: Left arm, Patient Position: Sitting, Cuff Size: Adult Regular)   Pulse 89   Temp 97.8  F (36.6  C) (Oral)   Resp 16   Ht 1.778 m (5' 10\")   Wt 68.9 kg (152 lb)   SpO2 100%   BMI 21.81 kg/m     Estimated body mass index is 21.81 kg/m  as calculated from the following:    Height as of this encounter: 1.778 m (5' 10\").    Weight as of this encounter: 68.9 kg (152 lb).    Physical Exam          Signed Electronically by: Constance Galan PA-C    "

## 2024-07-16 NOTE — PATIENT INSTRUCTIONS
Patient Education   Preventive Care Advice   This is general advice given by our system to help you stay healthy. However, your care team may have specific advice just for you. Please talk to your care team about your preventive care needs.  Nutrition  Eat 5 or more servings of fruits and vegetables each day.  Try wheat bread, brown rice and whole grain pasta (instead of white bread, rice, and pasta).  Get enough calcium and vitamin D. Check the label on foods and aim for 100% of the RDA (recommended daily allowance).  Lifestyle  Exercise at least 150 minutes each week  (30 minutes a day, 5 days a week).  Do muscle strengthening activities 2 days a week. These help control your weight and prevent disease.  No smoking.  Wear sunscreen to prevent skin cancer.  Have a dental exam and cleaning every 6 months.  Yearly exams  See your health care team every year to talk about:  Any changes in your health.  Any medicines your care team has prescribed.  Preventive care, family planning, and ways to prevent chronic diseases.  Shots (vaccines)   HPV shots (up to age 26), if you've never had them before.  Hepatitis B shots (up to age 59), if you've never had them before.  COVID-19 shot: Get this shot when it's due.  Flu shot: Get a flu shot every year.  Tetanus shot: Get a tetanus shot every 10 years.  Pneumococcal, hepatitis A, and RSV shots: Ask your care team if you need these based on your risk.  Shingles shot (for age 50 and up)  General health tests  Diabetes screening:  Starting at age 35, Get screened for diabetes at least every 3 years.  If you are younger than age 35, ask your care team if you should be screened for diabetes.  Cholesterol test: At age 39, start having a cholesterol test every 5 years, or more often if advised.  Bone density scan (DEXA): At age 50, ask your care team if you should have this scan for osteoporosis (brittle bones).  Hepatitis C: Get tested at least once in your life.  STIs (sexually  transmitted infections)  Before age 24: Ask your care team if you should be screened for STIs.  After age 24: Get screened for STIs if you're at risk. You are at risk for STIs (including HIV) if:  You are sexually active with more than one person.  You don't use condoms every time.  You or a partner was diagnosed with a sexually transmitted infection.  If you are at risk for HIV, ask about PrEP medicine to prevent HIV.  Get tested for HIV at least once in your life, whether you are at risk for HIV or not.  Cancer screening tests  Cervical cancer screening: If you have a cervix, begin getting regular cervical cancer screening tests starting at age 21.  Breast cancer scan (mammogram): If you've ever had breasts, begin having regular mammograms starting at age 40. This is a scan to check for breast cancer.  Colon cancer screening: It is important to start screening for colon cancer at age 45.  Have a colonoscopy test every 10 years (or more often if you're at risk) Or, ask your provider about stool tests like a FIT test every year or Cologuard test every 3 years.  To learn more about your testing options, visit:   .  For help making a decision, visit:   https://bit.ly/iy95909.  Prostate cancer screening test: If you have a prostate, ask your care team if a prostate cancer screening test (PSA) at age 55 is right for you.  Lung cancer screening: If you are a current or former smoker ages 50 to 80, ask your care team if ongoing lung cancer screenings are right for you.  For informational purposes only. Not to replace the advice of your health care provider. Copyright   2023 [employer]. All rights reserved. Clinically reviewed by the [facility] Transitions Program. [employer] [id number] - REV 01/24.

## 2024-07-17 LAB
ALBUMIN SERPL BCG-MCNC: 4.6 G/DL (ref 3.5–5.2)
ALP SERPL-CCNC: 46 U/L (ref 40–150)
ALT SERPL W P-5'-P-CCNC: 14 U/L (ref 0–70)
ANION GAP SERPL CALCULATED.3IONS-SCNC: 13 MMOL/L (ref 7–15)
AST SERPL W P-5'-P-CCNC: 31 U/L (ref 0–45)
BILIRUB SERPL-MCNC: 0.8 MG/DL
BUN SERPL-MCNC: 18.2 MG/DL (ref 6–20)
CALCIUM SERPL-MCNC: 9.5 MG/DL (ref 8.8–10.4)
CHLORIDE SERPL-SCNC: 101 MMOL/L (ref 98–107)
CHOLEST SERPL-MCNC: 174 MG/DL
CREAT SERPL-MCNC: 1.11 MG/DL (ref 0.67–1.17)
EGFRCR SERPLBLD CKD-EPI 2021: 86 ML/MIN/1.73M2
FASTING STATUS PATIENT QL REPORTED: NO
FASTING STATUS PATIENT QL REPORTED: NO
GLUCOSE SERPL-MCNC: 84 MG/DL (ref 70–99)
HCO3 SERPL-SCNC: 25 MMOL/L (ref 22–29)
HDLC SERPL-MCNC: 56 MG/DL
HIV 1+2 AB+HIV1 P24 AG SERPL QL IA: NONREACTIVE
LDLC SERPL CALC-MCNC: 97 MG/DL
NONHDLC SERPL-MCNC: 118 MG/DL
POTASSIUM SERPL-SCNC: 4.7 MMOL/L (ref 3.4–5.3)
PROT SERPL-MCNC: 6.9 G/DL (ref 6.4–8.3)
SODIUM SERPL-SCNC: 139 MMOL/L (ref 135–145)
TRIGL SERPL-MCNC: 106 MG/DL

## 2024-07-18 ENCOUNTER — MYC MEDICAL ADVICE (OUTPATIENT)
Dept: FAMILY MEDICINE | Facility: CLINIC | Age: 39
End: 2024-07-18
Payer: COMMERCIAL

## 2024-07-18 ENCOUNTER — TRANSFERRED RECORDS (OUTPATIENT)
Dept: HEALTH INFORMATION MANAGEMENT | Facility: CLINIC | Age: 39
End: 2024-07-18
Payer: COMMERCIAL

## 2024-07-24 ENCOUNTER — TRANSFERRED RECORDS (OUTPATIENT)
Dept: HEALTH INFORMATION MANAGEMENT | Facility: CLINIC | Age: 39
End: 2024-07-24
Payer: COMMERCIAL

## 2024-07-29 ENCOUNTER — TRANSFERRED RECORDS (OUTPATIENT)
Dept: HEALTH INFORMATION MANAGEMENT | Facility: CLINIC | Age: 39
End: 2024-07-29
Payer: COMMERCIAL

## 2024-09-24 ENCOUNTER — TRANSFERRED RECORDS (OUTPATIENT)
Dept: HEALTH INFORMATION MANAGEMENT | Facility: CLINIC | Age: 39
End: 2024-09-24
Payer: COMMERCIAL

## 2024-09-25 ENCOUNTER — LAB (OUTPATIENT)
Dept: LAB | Facility: CLINIC | Age: 39
End: 2024-09-25
Payer: COMMERCIAL

## 2024-09-25 DIAGNOSIS — M79.673 FOOT PAIN: Primary | ICD-10-CM

## 2024-09-25 LAB
BASOPHILS # BLD AUTO: 0 10E3/UL (ref 0–0.2)
BASOPHILS NFR BLD AUTO: 1 %
EOSINOPHIL # BLD AUTO: 0.1 10E3/UL (ref 0–0.7)
EOSINOPHIL NFR BLD AUTO: 3 %
ERYTHROCYTE [DISTWIDTH] IN BLOOD BY AUTOMATED COUNT: 12.1 % (ref 10–15)
ERYTHROCYTE [SEDIMENTATION RATE] IN BLOOD BY WESTERGREN METHOD: 3 MM/HR (ref 0–15)
HCT VFR BLD AUTO: 43.1 % (ref 40–53)
HGB BLD-MCNC: 14.6 G/DL (ref 13.3–17.7)
IMM GRANULOCYTES # BLD: 0 10E3/UL
IMM GRANULOCYTES NFR BLD: 0 %
LYMPHOCYTES # BLD AUTO: 1.8 10E3/UL (ref 0.8–5.3)
LYMPHOCYTES NFR BLD AUTO: 36 %
MCH RBC QN AUTO: 29.6 PG (ref 26.5–33)
MCHC RBC AUTO-ENTMCNC: 33.9 G/DL (ref 31.5–36.5)
MCV RBC AUTO: 87 FL (ref 78–100)
MONOCYTES # BLD AUTO: 0.3 10E3/UL (ref 0–1.3)
MONOCYTES NFR BLD AUTO: 7 %
NEUTROPHILS # BLD AUTO: 2.7 10E3/UL (ref 1.6–8.3)
NEUTROPHILS NFR BLD AUTO: 54 %
PLATELET # BLD AUTO: 242 10E3/UL (ref 150–450)
RBC # BLD AUTO: 4.93 10E6/UL (ref 4.4–5.9)
RHEUMATOID FACT SERPL-ACNC: <10 IU/ML
URATE SERPL-MCNC: 5 MG/DL (ref 3.4–7)
WBC # BLD AUTO: 5 10E3/UL (ref 4–11)

## 2024-09-25 PROCEDURE — 86618 LYME DISEASE ANTIBODY: CPT

## 2024-09-25 PROCEDURE — 86431 RHEUMATOID FACTOR QUANT: CPT

## 2024-09-25 PROCEDURE — 81374 HLA I TYPING 1 ANTIGEN LR: CPT

## 2024-09-25 PROCEDURE — 84550 ASSAY OF BLOOD/URIC ACID: CPT

## 2024-09-25 PROCEDURE — 85025 COMPLETE CBC W/AUTO DIFF WBC: CPT

## 2024-09-25 PROCEDURE — 85652 RBC SED RATE AUTOMATED: CPT

## 2024-09-25 PROCEDURE — 36415 COLL VENOUS BLD VENIPUNCTURE: CPT

## 2024-09-26 LAB — B BURGDOR IGG+IGM SER QL: 0.08

## 2024-10-02 LAB
B LOCUS: NORMAL
B27TEST METHOD: NORMAL

## 2024-10-22 ENCOUNTER — TRANSFERRED RECORDS (OUTPATIENT)
Dept: HEALTH INFORMATION MANAGEMENT | Facility: CLINIC | Age: 39
End: 2024-10-22
Payer: COMMERCIAL

## 2024-10-25 ENCOUNTER — IMMUNIZATION (OUTPATIENT)
Dept: FAMILY MEDICINE | Facility: CLINIC | Age: 39
End: 2024-10-25
Payer: COMMERCIAL

## 2024-10-25 VITALS — TEMPERATURE: 99.5 F

## 2024-10-25 DIAGNOSIS — Z23 ENCOUNTER FOR IMMUNIZATION: Primary | ICD-10-CM

## 2024-10-25 PROCEDURE — 90480 ADMN SARSCOV2 VAC 1/ONLY CMP: CPT

## 2024-10-25 PROCEDURE — 99207 PR NO CHARGE NURSE ONLY: CPT

## 2024-10-25 PROCEDURE — 90656 IIV3 VACC NO PRSV 0.5 ML IM: CPT

## 2024-10-25 PROCEDURE — 91320 SARSCV2 VAC 30MCG TRS-SUC IM: CPT

## 2024-10-25 PROCEDURE — 90471 IMMUNIZATION ADMIN: CPT

## 2024-10-25 ASSESSMENT — ANXIETY QUESTIONNAIRES
3. WORRYING TOO MUCH ABOUT DIFFERENT THINGS: NOT AT ALL
IF YOU CHECKED OFF ANY PROBLEMS ON THIS QUESTIONNAIRE, HOW DIFFICULT HAVE THESE PROBLEMS MADE IT FOR YOU TO DO YOUR WORK, TAKE CARE OF THINGS AT HOME, OR GET ALONG WITH OTHER PEOPLE: NOT DIFFICULT AT ALL
GAD7 TOTAL SCORE: 0
4. TROUBLE RELAXING: NOT AT ALL
GAD7 TOTAL SCORE: 0
2. NOT BEING ABLE TO STOP OR CONTROL WORRYING: NOT AT ALL
6. BECOMING EASILY ANNOYED OR IRRITABLE: NOT AT ALL
GAD7 TOTAL SCORE: 0
7. FEELING AFRAID AS IF SOMETHING AWFUL MIGHT HAPPEN: NOT AT ALL
5. BEING SO RESTLESS THAT IT IS HARD TO SIT STILL: NOT AT ALL
1. FEELING NERVOUS, ANXIOUS, OR ON EDGE: NOT AT ALL
8. IF YOU CHECKED OFF ANY PROBLEMS, HOW DIFFICULT HAVE THESE MADE IT FOR YOU TO DO YOUR WORK, TAKE CARE OF THINGS AT HOME, OR GET ALONG WITH OTHER PEOPLE?: NOT DIFFICULT AT ALL
7. FEELING AFRAID AS IF SOMETHING AWFUL MIGHT HAPPEN: NOT AT ALL

## 2024-10-25 ASSESSMENT — PATIENT HEALTH QUESTIONNAIRE - PHQ9
SUM OF ALL RESPONSES TO PHQ QUESTIONS 1-9: 0
10. IF YOU CHECKED OFF ANY PROBLEMS, HOW DIFFICULT HAVE THESE PROBLEMS MADE IT FOR YOU TO DO YOUR WORK, TAKE CARE OF THINGS AT HOME, OR GET ALONG WITH OTHER PEOPLE: NOT DIFFICULT AT ALL
SUM OF ALL RESPONSES TO PHQ QUESTIONS 1-9: 0

## 2024-10-25 NOTE — PROGRESS NOTES
Pt received COVID vaccine and flu vaccine per standing orders. Pt given VIS forms prior to immunization administration.   Prior to immunization administration, verified patients identity using patient s name and date of birth. Please see Immunization Activity reviewed prior to immunization administration for additional information.     Is the patient's temperature normal (100.5 or less)? Yes     Patient MEETS CRITERIA. PROCEED with vaccine administration.        10/25/2024   General Questionnaire    Do you have any questions for your care team about the vaccines you will be receiving today? no                10/25/2024   COVID   Have you had myocarditis or pericarditis (inflammation of or around the heart muscle) after getting a COVID-19 vaccine? No   Have you had a serious reaction to a COVID vaccine or something in a COVID vaccine, like polyethylene glycol (PEG) or polysorbate? No   Have you had multisystem inflammatory syndrome from COVID-19 in the past 90 days? No   Have you received a bone marrow transplant within the previous 3 months? No            Patient MEETS CRITERIA. PROCEED with vaccine administration.            10/25/2024   INFLUENZA   Would you like to receive the flu shot or the nasal flu vaccine today? Flu Shot   Have you had a serious reaction to a flu vaccine or something in a flu vaccine? No   Have you had Guillain-[place] syndrome within 6 weeks of getting a vaccine? No   Have you received a bone marrow transplant within the previous 6 months? No            Patient MEETS CRITERIA. PROCEED with vaccine administration.        Patient instructed to remain in clinic for 15 minutes afterwards, and to report any adverse reactions.      Link to Ancillary Visit Immunization Standing Orders SmartSet     Performed by Suleman Curtis RN on 10/25/2024.

## 2024-11-08 ENCOUNTER — TRANSFERRED RECORDS (OUTPATIENT)
Dept: HEALTH INFORMATION MANAGEMENT | Facility: CLINIC | Age: 39
End: 2024-11-08
Payer: COMMERCIAL

## 2024-11-11 ENCOUNTER — OFFICE VISIT (OUTPATIENT)
Dept: FAMILY MEDICINE | Facility: CLINIC | Age: 39
End: 2024-11-11
Payer: COMMERCIAL

## 2024-11-11 VITALS
HEIGHT: 70 IN | SYSTOLIC BLOOD PRESSURE: 124 MMHG | BODY MASS INDEX: 21.85 KG/M2 | DIASTOLIC BLOOD PRESSURE: 78 MMHG | HEART RATE: 73 BPM | OXYGEN SATURATION: 98 % | TEMPERATURE: 97.8 F | RESPIRATION RATE: 18 BRPM | WEIGHT: 152.6 LBS

## 2024-11-11 DIAGNOSIS — M25.50 MULTIPLE JOINT PAIN: Primary | ICD-10-CM

## 2024-11-11 LAB
BASOPHILS # BLD AUTO: 0 10E3/UL (ref 0–0.2)
BASOPHILS NFR BLD AUTO: 1 %
EOSINOPHIL # BLD AUTO: 0.1 10E3/UL (ref 0–0.7)
EOSINOPHIL NFR BLD AUTO: 2 %
ERYTHROCYTE [DISTWIDTH] IN BLOOD BY AUTOMATED COUNT: 12.4 % (ref 10–15)
ERYTHROCYTE [SEDIMENTATION RATE] IN BLOOD BY WESTERGREN METHOD: 3 MM/HR (ref 0–15)
HCT VFR BLD AUTO: 44.8 % (ref 40–53)
HGB BLD-MCNC: 15.2 G/DL (ref 13.3–17.7)
IMM GRANULOCYTES # BLD: 0 10E3/UL
IMM GRANULOCYTES NFR BLD: 0 %
LYMPHOCYTES # BLD AUTO: 1.8 10E3/UL (ref 0.8–5.3)
LYMPHOCYTES NFR BLD AUTO: 31 %
MCH RBC QN AUTO: 30.7 PG (ref 26.5–33)
MCHC RBC AUTO-ENTMCNC: 33.9 G/DL (ref 31.5–36.5)
MCV RBC AUTO: 91 FL (ref 78–100)
MONOCYTES # BLD AUTO: 0.4 10E3/UL (ref 0–1.3)
MONOCYTES NFR BLD AUTO: 7 %
NEUTROPHILS # BLD AUTO: 3.4 10E3/UL (ref 1.6–8.3)
NEUTROPHILS NFR BLD AUTO: 61 %
PLATELET # BLD AUTO: 257 10E3/UL (ref 150–450)
RBC # BLD AUTO: 4.95 10E6/UL (ref 4.4–5.9)
WBC # BLD AUTO: 5.7 10E3/UL (ref 4–11)

## 2024-11-11 PROCEDURE — 87798 DETECT AGENT NOS DNA AMP: CPT | Performed by: NURSE PRACTITIONER

## 2024-11-11 PROCEDURE — 85025 COMPLETE CBC W/AUTO DIFF WBC: CPT | Performed by: NURSE PRACTITIONER

## 2024-11-11 PROCEDURE — 84443 ASSAY THYROID STIM HORMONE: CPT | Performed by: NURSE PRACTITIONER

## 2024-11-11 PROCEDURE — 86038 ANTINUCLEAR ANTIBODIES: CPT | Performed by: NURSE PRACTITIONER

## 2024-11-11 PROCEDURE — 86140 C-REACTIVE PROTEIN: CPT | Performed by: NURSE PRACTITIONER

## 2024-11-11 PROCEDURE — 36415 COLL VENOUS BLD VENIPUNCTURE: CPT | Performed by: NURSE PRACTITIONER

## 2024-11-11 PROCEDURE — G2211 COMPLEX E/M VISIT ADD ON: HCPCS | Performed by: NURSE PRACTITIONER

## 2024-11-11 PROCEDURE — 86039 ANTINUCLEAR ANTIBODIES (ANA): CPT | Performed by: NURSE PRACTITIONER

## 2024-11-11 PROCEDURE — 86200 CCP ANTIBODY: CPT | Performed by: NURSE PRACTITIONER

## 2024-11-11 PROCEDURE — 99213 OFFICE O/P EST LOW 20 MIN: CPT | Performed by: NURSE PRACTITIONER

## 2024-11-11 PROCEDURE — 86431 RHEUMATOID FACTOR QUANT: CPT | Performed by: NURSE PRACTITIONER

## 2024-11-11 PROCEDURE — 85652 RBC SED RATE AUTOMATED: CPT | Performed by: NURSE PRACTITIONER

## 2024-11-11 PROCEDURE — 80053 COMPREHEN METABOLIC PANEL: CPT | Performed by: NURSE PRACTITIONER

## 2024-11-11 PROCEDURE — 87468 ANAPLSMA PHGCYTOPHLM AMP PRB: CPT | Performed by: NURSE PRACTITIONER

## 2024-11-11 NOTE — PROGRESS NOTES
Assessment & Plan     Multiple joint pain  Previous labs reviewed.  Will check additional labs here today.  Continue with physical therapy.  Reinforced importance of wearing shoes at all times.  - Anti Nuclear Adore IgG by IFA with Reflex; Future  - Erythrocyte sedimentation rate auto; Future  - CRP inflammation; Future  - Cyclic Citrullinated Peptide Antibody IgG; Future  - Rheumatoid factor; Future  - TSH with free T4 reflex; Future  - Tick-Borne Disease Panel by PCR; Future  - Comprehensive metabolic panel; Future  - CBC with Platelets & Differential; Future  - Anti Nuclear Adore IgG by IFA with Reflex  - Erythrocyte sedimentation rate auto  - CRP inflammation  - Cyclic Citrullinated Peptide Antibody IgG  - Rheumatoid factor  - TSH with free T4 reflex  - Tick-Borne Disease Panel by PCR  - Comprehensive metabolic panel  - CBC with Platelets & Differential      The longitudinal plan of care for the diagnosis(es)/condition(s) as documented were addressed during this visit. Due to the added complexity in care, I will continue to support Fadi in the subsequent management and with ongoing continuity of care.         Subjective   Fadi is a 39 year old, presenting for the following health issues:  RECHECK        11/11/2024     1:48 PM   Additional Questions   Roomed by Criss WHEELER         11/11/2024     1:48 PM   Patient Reported Additional Medications   Patient reports taking the following new medications None per patient     History of Present Illness       Reason for visit:  General physical  Symptom onset:  More than a month  Symptoms include:  Rule our systemic illness  Symptom intensity:  Moderate  Symptom progression:  Staying the same  Had these symptoms before:  Yes  Has tried/received treatment for these symptoms:  Yes  Previous treatment was successful:  Yes  Prior treatment description:  Pt  What makes it worse:  Flexion   He is taking medications regularly.       Chronic back pain, plantar fascitis,   Feels like  "too many problems for a young age.         Here today for physical and other concerns.  Just had physical in July with alternate provider.  Was changed to office visit only.  Works at [employer], is a .  Has chronic heel pain.  Sees a podiatrist.  Has been told that plantar fasciitis, arthritis in great toe and then likely has heel spurs.  Is going to be having an MRI of feet to ensure there are no other issues.  Has pain to multiple areas, has bilateral hip pain.  Hips hurt more when lays down.  Left side is a bit worse than right.  Has lower back pain.  Only hurts when is in flexion.  Is very active.  Is an  and does a lot of taping of joints and rotating and repetitive motions/movements.  Has been to physical therapy for feet, done shockwave therapy in the past.  Had ultrasound of feet.  Has custom orthotics.  Has been very consistent with foot wear for the last 6 months.  For the last 6 months has not been going barefoot.  Would like to get back to a time where can go barefoot in house.  Has right shoulder pain, history of labral tear.  Likes to play disc golf.  Some finger pain at times.  Has not ever noticed any swelling or redness to joints.            Objective    /78   Pulse 73   Temp 97.8  F (36.6  C) (Temporal)   Resp 18   Ht 1.778 m (5' 10\")   Wt 69.2 kg (152 lb 9.6 oz)   SpO2 98%   BMI 21.90 kg/m    Body mass index is 21.9 kg/m .  Physical Exam  Constitutional:       Appearance: He is well-developed.   Cardiovascular:      Rate and Rhythm: Normal rate and regular rhythm.      Heart sounds: Normal heart sounds.   Pulmonary:      Effort: Pulmonary effort is normal.      Breath sounds: Normal breath sounds.   Musculoskeletal:      Lumbar back: No swelling, deformity, spasms, tenderness or bony tenderness. Decreased range of motion.      Comments: No erythemia or swelling to joints.    Skin:     General: Skin is warm and dry.   Neurological:      " Mental Status: He is alert.                Signed Electronically by: MOO Boyle CNP

## 2024-11-12 LAB
A PHAGOCYTOPH DNA BLD QL NAA+PROBE: NOT DETECTED
ALBUMIN SERPL BCG-MCNC: 4.8 G/DL (ref 3.5–5.2)
ALP SERPL-CCNC: 53 U/L (ref 40–150)
ALT SERPL W P-5'-P-CCNC: 19 U/L (ref 0–70)
ANA PAT SER IF-IMP: ABNORMAL
ANA SER QL IF: ABNORMAL
ANA TITR SER IF: ABNORMAL {TITER}
ANION GAP SERPL CALCULATED.3IONS-SCNC: 10 MMOL/L (ref 7–15)
AST SERPL W P-5'-P-CCNC: 28 U/L (ref 0–45)
BABESIA DNA BLD QL NAA+PROBE: NOT DETECTED
BILIRUB SERPL-MCNC: 0.8 MG/DL
BUN SERPL-MCNC: 19.9 MG/DL (ref 6–20)
CALCIUM SERPL-MCNC: 9.8 MG/DL (ref 8.8–10.4)
CCP AB SER IA-ACNC: 1.5 U/ML
CHLORIDE SERPL-SCNC: 100 MMOL/L (ref 98–107)
CREAT SERPL-MCNC: 1.09 MG/DL (ref 0.67–1.17)
CRP SERPL-MCNC: <3 MG/L
EGFRCR SERPLBLD CKD-EPI 2021: 88 ML/MIN/1.73M2
EHRLICHIA DNA SPEC QL NAA+PROBE: NOT DETECTED
GLUCOSE SERPL-MCNC: 94 MG/DL (ref 70–99)
HCO3 SERPL-SCNC: 28 MMOL/L (ref 22–29)
POTASSIUM SERPL-SCNC: 5 MMOL/L (ref 3.4–5.3)
PROT SERPL-MCNC: 7.4 G/DL (ref 6.4–8.3)
RHEUMATOID FACT SERPL-ACNC: <10 IU/ML
SODIUM SERPL-SCNC: 138 MMOL/L (ref 135–145)
TSH SERPL DL<=0.005 MIU/L-ACNC: 3.4 UIU/ML (ref 0.3–4.2)

## 2024-11-14 ENCOUNTER — TRANSFERRED RECORDS (OUTPATIENT)
Dept: HEALTH INFORMATION MANAGEMENT | Facility: CLINIC | Age: 39
End: 2024-11-14
Payer: COMMERCIAL

## 2024-11-20 ENCOUNTER — TRANSFERRED RECORDS (OUTPATIENT)
Dept: HEALTH INFORMATION MANAGEMENT | Facility: CLINIC | Age: 39
End: 2024-11-20
Payer: COMMERCIAL

## 2024-11-26 ENCOUNTER — TRANSFERRED RECORDS (OUTPATIENT)
Dept: HEALTH INFORMATION MANAGEMENT | Facility: CLINIC | Age: 39
End: 2024-11-26
Payer: COMMERCIAL

## 2025-01-17 ENCOUNTER — TRANSFERRED RECORDS (OUTPATIENT)
Dept: HEALTH INFORMATION MANAGEMENT | Facility: CLINIC | Age: 40
End: 2025-01-17
Payer: COMMERCIAL

## 2025-01-23 ENCOUNTER — TRANSFERRED RECORDS (OUTPATIENT)
Dept: HEALTH INFORMATION MANAGEMENT | Facility: CLINIC | Age: 40
End: 2025-01-23

## 2025-01-27 ENCOUNTER — TRANSFERRED RECORDS (OUTPATIENT)
Dept: HEALTH INFORMATION MANAGEMENT | Facility: CLINIC | Age: 40
End: 2025-01-27
Payer: COMMERCIAL

## 2025-02-03 ENCOUNTER — TRANSFERRED RECORDS (OUTPATIENT)
Dept: HEALTH INFORMATION MANAGEMENT | Facility: CLINIC | Age: 40
End: 2025-02-03
Payer: COMMERCIAL

## 2025-02-04 ENCOUNTER — THERAPY VISIT (OUTPATIENT)
Dept: PHYSICAL THERAPY | Facility: CLINIC | Age: 40
End: 2025-02-04
Payer: COMMERCIAL

## 2025-02-04 DIAGNOSIS — M25.511 ACUTE PAIN OF RIGHT SHOULDER: Primary | ICD-10-CM

## 2025-02-04 PROCEDURE — 97161 PT EVAL LOW COMPLEX 20 MIN: CPT | Mod: GP | Performed by: PHYSICAL THERAPIST

## 2025-02-04 ASSESSMENT — ACTIVITIES OF DAILY LIVING (ADL)
PUTTING_ON_AN_UNDERSHIRT_OR_A_PULLOVER_SWEATER: 0
PUTTING_ON_A_SHIRT_THAT_BUTTONS_DOWN_THE_FRONT: 0
PLACING_AN_OBJECT_ON_A_HIGH_SHELF: 3
PUSHING_WITH_THE_INVOLVED_ARM: 0
WASHING_YOUR_BACK: 0
TOUCHING_THE_BACK_OF_YOUR_NECK: 0
PLEASE_INDICATE_YOR_PRIMARY_REASON_FOR_REFERRAL_TO_THERAPY:: SHOULDER
PUTTING_ON_YOUR_PANTS: 0
WASHING_YOUR_HAIR?: 0
WHEN_LYING_ON_THE_INVOLVED_SIDE: 3
REACHING_FOR_SOMETHING_ON_A_HIGH_SHELF: 4
CARRYING_A_HEAVY_OBJECT_OF_10_POUNDS: 0
AT_ITS_WORST?: 8
REMOVING_SOMETHING_FROM_YOUR_BACK_POCKET: 0

## 2025-02-04 NOTE — PROGRESS NOTES
PHYSICAL THERAPY EVALUATION  Type of Visit: Evaluation        Fall Risk Screen:  Fall screen completed by: PT  Have you fallen 2 or more times in the past year?: No  Have you fallen and had an injury in the past year?: Yes  Is patient a fall risk?: No  Fall screen comments: pt fell and injured shoulder on 12-25    Subjective         Presenting condition or subjective complaint: pt has had ongoing shoulder issues since 2019 with R shoulder--first showing partial RCT which had progressed to a full-thickness tear and then he reinjured it during a fall on 12-25-24  Date of onset: 12/25/24    Relevant medical history:     Dates & types of surgery: none    Prior diagnostic imaging/testing results: MRI     Prior therapy history for the same diagnosis, illness or injury: Yes december        Living Environment  Social support: With family members   Type of home: House   Stairs to enter the home: Yes 8 Is there a railing: Yes     Ramp: No   Stairs inside the home: Yes 8 Is there a railing: Yes     Help at home: None  Equipment owned:       Employment: Yes AT  Hobbies/Interests: disc golf    Patient goals for therapy: disc golf    Pain assessment: See objective evaluation for additional pain details     Objective   Pain:   Location: anterior, lateral, UT  At rest: 0/10  With activity: 5/10  Quality: dull ache, sharp,   Frequency: Intermittent  Time of Day: crunchy in the am but no pain  Pain is exacerbated by: reaching overhead, behind back, lying on shoulder and throwing, wrestling with his son  Pain is relieved by: rest, ice, anti-inflammatories     Range of Motion:     AROM L shoulder: 165/165/T7/80   AROM R shoulder: 165/165/T7/80 but at 90/90 ABD he has ERP with ER and limited 5-10 deg compared to L side     Strength:  L shoulder: NT    R shoulder: NT  Notable mechanics: R scapular medial border prominence, winging and upward translation with flexion and abduction.   Special Tests: NT  Palpation: Tension and hypertonicity  noted at R UT, LS, rhomboids  Posture: minimal Fwd head    Assessment & Plan   CLINICAL IMPRESSIONS  Medical Diagnosis: R shoulder RCT    Treatment Diagnosis: R shoulder pain   Impression/Assessment: Patient is a 39 year old male with R shoulder complaints.  The following significant findings have been identified: Pain, Decreased ROM/flexibility, Decreased joint mobility, and Decreased proprioception. These impairments interfere with their ability to perform self care tasks, work tasks, and recreational activities as compared to previous level of function.     Clinical Decision Making (Complexity):  Clinical Presentation: Stable/Uncomplicated  Clinical Presentation Rationale: based on medical and personal factors listed in PT evaluation  Clinical Decision Making (Complexity): Low complexity    PLAN OF CARE  Treatment Interventions:  Interventions: Neuromuscular Re-education, Therapeutic Exercise    Long Term Goals     PT Goal 1  Goal Identifier: understand HEP and importance of progression, time frame for healing and pre-post-op progressions and recovery  Goal Progress: pt demonstrated understanding  Target Date: 02/04/25  Date Met: 02/04/25      Frequency of Treatment: 1x only  Duration of Treatment: 1x only    Recommended Referrals to Other Professionals: Physical Therapy  Education Assessment:        Risks and benefits of evaluation/treatment have been explained.   Patient/Family/caregiver agrees with Plan of Care.     Evaluation Time:     PT Eval, Low Complexity Minutes (45564): 15  Evaluation Only     Signing Clinician: Nasima Estrada, PT

## 2025-02-05 ENCOUNTER — VIRTUAL VISIT (OUTPATIENT)
Dept: FAMILY MEDICINE | Facility: CLINIC | Age: 40
End: 2025-02-05
Payer: COMMERCIAL

## 2025-02-05 DIAGNOSIS — J01.90 ACUTE SINUSITIS WITH SYMPTOMS > 10 DAYS: Primary | ICD-10-CM

## 2025-02-05 PROCEDURE — 98006 SYNCH AUDIO-VIDEO EST MOD 30: CPT | Performed by: NURSE PRACTITIONER

## 2025-02-05 RX ORDER — AZITHROMYCIN 250 MG/1
TABLET, FILM COATED ORAL
Qty: 6 TABLET | Refills: 0 | Status: SHIPPED | OUTPATIENT
Start: 2025-02-05 | End: 2025-02-10

## 2025-02-05 ASSESSMENT — ENCOUNTER SYMPTOMS
CHILLS: 0
VOMITING: 0
SINUS PRESSURE: 1
SHORTNESS OF BREATH: 0
FEVER: 0
COUGH: 1
NAUSEA: 0
DIARRHEA: 0

## 2025-02-05 NOTE — PROGRESS NOTES
Fadi is a 39 year old who is being evaluated via a billable video visit.    How would you like to obtain your AVS? MyChart  If the video visit is dropped, the invitation should be resent by: Text to cell phone: [phone]  Will anyone else be joining your video visit? No      Assessment & Plan     1. Acute sinusitis with symptoms > 10 days (Primary)    Patient in no acute distress throughout video visit.  He has allergy to amoxicillin and prior cephalosporin use caused hepatic injury requiring hepatology workup (reviewed prior hepatology notes and labs). In light of this, will start azithromycin.    Instructed patient to make in-person appointment if symptoms worsen/fail to improve to which he was agreeable.       - azithromycin (ZITHROMAX) 250 MG tablet; Take 2 tablets (500 mg) by mouth daily for 1 day, THEN 1 tablet (250 mg) daily for 4 days.  Dispense: 6 tablet; Refill: 0    Follow-up if symptoms worsen/fail to improve.    All questions/concerns addressed. Patient stated understanding/agreement to plan of care.        Note: Chart documentation was done in part with Dragon Voice Recognition software.  Although reviewed after completion, some word and grammatical errors may remain. Please contact author for any clarification or concerns.            Subjective   Fadi is a 39 year old, presenting for the following health issues:  Cough      2/5/2025    12:26 PM   Additional Questions   Roomed by Margoth     History of Present Illness       Reason for visit:  Chest and face Congestion coughing  Symptom onset:  3-4 weeks ago  Symptoms include:  Cough head and chest congestion phlegm  Symptom intensity:  Moderate  Symptom progression:  Staying the same  Had these symptoms before:  Yes  Has tried/received treatment for these symptoms:  Yes  Previous treatment was successful:  Yes  Prior treatment description:  Psudafed flownase (both currently taking) eventually took zpack  What makes it worse:  Laying horizontal increases  "congestion have to cough it up in the morning  What makes it better:  Being vertical   He is taking medications regularly.     Video start: ~12:38pm  O- 15 days ago  Started with sore throat which has resolved, then developed nasal congestion and \"chest type of cold.\"  Has persistent sinus pressure in maxillary area, worse at night.  No COVID test.  Tx-  Sudafed and flonase  No other acute concerns/symptoms at time of exam.        Review of Systems   Constitutional:  Negative for chills and fever.   HENT:  Positive for sinus pressure.    Respiratory:  Positive for cough. Negative for shortness of breath.    Cardiovascular:  Negative for chest pain.   Gastrointestinal:  Negative for diarrhea, nausea and vomiting.   Constitutional, HEENT, cardiovascular, pulmonary, gi and gu systems are negative, except as otherwise noted.        Past Medical History:   Diagnosis Date    Chronic nonallergic rhinitis 5/27/10 skin tests    all negative    Gastroesophageal reflux disease without esophagitis 10/6/2015    GERD (gastroesophageal reflux disease)        Past Surgical History:   Procedure Laterality Date    APPENDECTOMY  2004    ESOPHAGOSCOPY, GASTROSCOPY, DUODENOSCOPY (EGD), COMBINED N/A 11/9/2018    Procedure: COMBINED ESOPHAGOSCOPY, GASTROSCOPY, DUODENOSCOPY (EGD), BIOPSY SINGLE OR MULTIPLE;  Surgeon: Remy Vigil MD;  Location:  GI    IR LIVER BIOPSY PERCUTANEOUS  9/1/2020    IRRIGATION AND DEBRIDEMENT HAND, COMBINED Right 5/20/2020    Procedure: Irrigation and debridement of the right long finger and its flexor sheath;  Surgeon: Jeannine Escobar MD;  Location: [facility]    PERCUTANEOUS BIOPSY LIVER Right 9/1/2020    Procedure: NEEDLE BIOPSY, LIVER, PERCUTANEOUS;  Surgeon: Shelly Meyer MD;  [facility]    SINUS SURGERY  4/27/10       Family History   Problem Relation Age of Onset    Hypertension Father     Cardiovascular Maternal Grandfather     Heart Disease Maternal Grandfather     Allergies " Brother        Social History     Tobacco Use    Smoking status: Never    Smokeless tobacco: Never   Substance Use Topics    Alcohol use: Yes     Alcohol/week: 0.0 standard drinks of alcohol     Comment: 0-6 per week      Current Outpatient Medications   Medication Sig Dispense Refill            No current facility-administered medications for this visit.             Objective           Vitals:  No vitals were obtained today due to virtual visit.    Physical Exam   GENERAL: alert and no distress  EYES: Eyes grossly normal to inspection.  No discharge or erythema, or obvious scleral/conjunctival abnormalities.  RESP: No audible wheeze, cough, or visible cyanosis.    SKIN: Visible skin clear. No significant rash, abnormal pigmentation or lesions.  NEURO: Cranial nerves grossly intact.  Mentation and speech appropriate for age.  PSYCH: Appropriate affect, tone, and pace of words          Video-Visit Details  Video end:12:48 pm  Type of service:  Video Visit   Originating Location (pt. Location): Home  Distant Location (provider location):  On-site  Platform used for Video Visit: Lulú  Signed Electronically by: MOO Rivera CNP

## 2025-02-18 ENCOUNTER — TRANSFERRED RECORDS (OUTPATIENT)
Dept: HEALTH INFORMATION MANAGEMENT | Facility: CLINIC | Age: 40
End: 2025-02-18
Payer: COMMERCIAL

## 2025-03-17 ENCOUNTER — OFFICE VISIT (OUTPATIENT)
Dept: ORTHOPEDICS | Facility: CLINIC | Age: 40
End: 2025-03-17
Payer: COMMERCIAL

## 2025-03-17 VITALS — WEIGHT: 154 LBS | HEIGHT: 71 IN | BODY MASS INDEX: 21.56 KG/M2

## 2025-03-17 DIAGNOSIS — M71.9 DISORDER OF BURSAE AND TENDONS IN SHOULDER REGION: Primary | ICD-10-CM

## 2025-03-17 DIAGNOSIS — M67.919 DISORDER OF BURSAE AND TENDONS IN SHOULDER REGION: Primary | ICD-10-CM

## 2025-03-17 PROCEDURE — 99204 OFFICE O/P NEW MOD 45 MIN: CPT | Performed by: ORTHOPAEDIC SURGERY

## 2025-03-17 NOTE — NURSING NOTE
"Reason For Visit:   Chief Complaint   Patient presents with    Consult     Right Rotator Cuff Tear       PCP: Hayder Reis  Ref: self     ?  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  Date of injury: 12/25/24  Date of surgery: N/A  Type of surgery: N/A.  Smoker: No  Request smoking cessation information: No    Right hand dominant    SANE score  Affected shoulder: Right  Right shoulder SANE: 80  Left shoulder SANE: 100    Ht 1.803 m (5' 11\")   Wt 69.9 kg (154 lb)   BMI 21.48 kg/m        Pain Assessment  Patient Currently in Pain: Yes  Patient's Stated Pain Goal: 8 (ranges with movement)    [place]       "

## 2025-03-17 NOTE — LETTER
3/17/2025      Emir Angela  [address]  [place] [address]      Dear Colleague,    Thank you for referring your patient, Emir Angela, to the [facility] [place]. Please see a copy of my visit note below.    CHIEF CONCERN: Right rotator cuff tear    HISTORY OF PRESENT ILLNESS: Mr. Angela is a 40-year-old right-hand-dominant male who I am seeing today as a second opinion for his right shoulder.  He had some shoulder symptoms over the winter and was seeing Dr. Chato Carpenter at [facility].  He then had a slip and fall on the ice over the winter and MRI demonstrated full-thickness cuff tear.  Dr. Carpenter spoke with him about nonoperative and operative options.  The patient has been hoping to compete in a national disc golf tournament in the spring.  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    Past Medical History:   Diagnosis Date     Chronic nonallergic rhinitis 5/27/10 skin tests    all negative     Gastroesophageal reflux disease without esophagitis 10/6/2015     GERD (gastroesophageal reflux disease)        Past Surgical History:   Procedure Laterality Date     APPENDECTOMY  2004     ESOPHAGOSCOPY, GASTROSCOPY, DUODENOSCOPY (EGD), COMBINED N/A 11/9/2018    Procedure: COMBINED ESOPHAGOSCOPY, GASTROSCOPY, DUODENOSCOPY (EGD), BIOPSY SINGLE OR MULTIPLE;  Surgeon: Remy Vigil MD;  Location:  GI     IR LIVER BIOPSY PERCUTANEOUS  9/1/2020     IRRIGATION AND DEBRIDEMENT HAND, COMBINED Right 5/20/2020    Procedure: Irrigation and debridement of the right long finger and its flexor sheath;  Surgeon: Jeannine Escobar MD;  Location: [facility]     PERCUTANEOUS BIOPSY LIVER Right 9/1/2020    Procedure: NEEDLE BIOPSY, LIVER, PERCUTANEOUS;  Surgeon: Shelly Meyer MD;  [facility]     SINUS SURGERY  4/27/10       No current outpatient medications on file.          Allergies   Allergen Reactions     Sulfa Antibiotics Hives     Amoxicillin Rash       SOCIAL  HISTORY:    Social History     Socioeconomic History     Marital status:      Spouse name: Not on file     Number of children: Not on file     Years of education: Not on file     Highest education level: Not on file   Occupational History     Not on file   Tobacco Use     Smoking status: Never     Smokeless tobacco: Never   Vaping Use     Vaping status: Never Used   Substance and Sexual Activity     Alcohol use: Yes     Alcohol/week: 0.0 standard drinks of alcohol     Comment: 0-6 per week      Drug use: No     Sexual activity: Yes     Partners: Female     Birth control/protection: Condom   Other Topics Concern     Parent/sibling w/ CABG, MI or angioplasty before 65F 55M? 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[employer]     Social Drivers of Health     Financial Resource Strain: Low Risk  (7/16/2024)    Financial Resource Strain      Within the past 12 months, have you or your family members you live with been unable to get utilities (heat, electricity) when it was really needed?: No   Food Insecurity: Low Risk  (7/16/2024)    Food Insecurity      Within the past 12 months, did you worry that your food would run out before you got money to buy more?: No      Within the past 12 months, did the food you bought just not last and you didn t have money to get more?: No   Transportation Needs: Low Risk  (7/16/2024)    Transportation Needs      Within the past 12 months, has lack of transportation kept you from medical appointments, getting your medicines, non-medical meetings or appointments, work, or from getting things that you need?: No   Physical Activity: Unknown (7/16/2024)    Exercise Vital Sign      Days of Exercise per Week: 5 days      Minutes of Exercise per Session: Not on file   Stress: No Stress Concern Present (7/16/2024)    [place] [employer] - Occupational Stress Questionnaire      Feeling of Stress : Not at all   Social  Connections: Unknown (7/16/2024)    Social Connection and Isolation Panel [NHANES]      Frequency of Communication with Friends and Family: Not on file      Frequency of Social Gatherings with Friends and Family: Three times a week      Attends Church Services: Not on file      Active Member of Clubs or Organizations: Not on file      Attends Club or Organization Meetings: Not on file      Marital Status: Not on file   Interpersonal Safety: Low Risk  (7/16/2024)    Interpersonal Safety      Do you feel physically and emotionally safe where you currently live?: Yes      Within the past 12 months, have you been hit, slapped, kicked or otherwise physically hurt by someone?: No      Within the past 12 months, have you been humiliated or emotionally abused in other ways by your partner or ex-partner?: No   Housing Stability: Low Risk  (7/16/2024)    Housing Stability      Do you have housing? : Yes      Are you worried about losing your housing?: No       FAMILY HISTORY: Reviewed in EMR      REVIEW OF SYSTEMS: Positive for that noted in past medical history and history of present illness and otherwise reviewed in EMR     PHYSICAL EXAM:    Adult male in no acute distress. Articulates and communicates with normal affect.  Respirations even and unlabored  Focused upper extremity exam: Skin intact. No erythema. Sensation intact all dermatomes into the hand to light touch. EPL, FPL, and Intrinsics intact. Right shoulder active motion is FE to 175, ER at side to 75, and IR to T8. Left shoulder active motion is FE to 180, ER to 80, and IR to T4.  Minimal pain with Neer and Betts. No pain on palpation over the AC joint. Mild pain on palpation over the long head of the biceps. FE and ER strength near 5/5.    IMAGING:  Right shoulder Xrays 11/14/23 were reviewed and demonstrate no glenohumeral OA. No superior humeral migration.     Right shoulder MRI dated 1/23/25 was reviewed and demonstrates a full thickness supraspinatus  tear involving nearly the entirety of the tendon from anterior to posterior. Limited retraction. No atrophy.     ASSESSMENT:    Right full thickness supraspinatus tear    PLAN:  I reviewed the imaging with the patient and we discussed the risks and benefits of nonoperative and operative treatment options. We discussed the risk of tear progression or enlargement over time. We reviewed the operative options and postoperative timeline. He would most want to participate in a frisbee golf tournament early this summer and is going to try to participate but will consider surgery at [facility] if his symptoms do not allow him to compete.     Sada Suarez MD      Again, thank you for allowing me to participate in the care of your patient.        Sincerely,        Sada Suarez MD    Electronically signed

## 2025-03-17 NOTE — PROGRESS NOTES
CHIEF CONCERN: Right rotator cuff tear    HISTORY OF PRESENT ILLNESS: Mr. Angela is a 40-year-old right-hand-dominant male who I am seeing today as a second opinion for his right shoulder.  He had some shoulder symptoms over the winter and was seeing Dr. Chato Carpenter at [facility].  He then had a slip and fall on the ice over the winter and MRI demonstrated full-thickness cuff tear.  Dr. Carpenter spoke with him about nonoperative and operative options.  The patient has been hoping to compete in a national disc golf tournament in the spring.  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    Past Medical History:   Diagnosis Date    Chronic nonallergic rhinitis 5/27/10 skin tests    all negative    Gastroesophageal reflux disease without esophagitis 10/6/2015    GERD (gastroesophageal reflux disease)        Past Surgical History:   Procedure Laterality Date    APPENDECTOMY  2004    ESOPHAGOSCOPY, GASTROSCOPY, DUODENOSCOPY (EGD), COMBINED N/A 11/9/2018    Procedure: COMBINED ESOPHAGOSCOPY, GASTROSCOPY, DUODENOSCOPY (EGD), BIOPSY SINGLE OR MULTIPLE;  Surgeon: Remy Vigil MD;  Location:  GI    IR LIVER BIOPSY PERCUTANEOUS  9/1/2020    IRRIGATION AND DEBRIDEMENT HAND, COMBINED Right 5/20/2020    Procedure: Irrigation and debridement of the right long finger and its flexor sheath;  Surgeon: Jeannine Escobar MD;  Location: [facility]    PERCUTANEOUS BIOPSY LIVER Right 9/1/2020    Procedure: NEEDLE BIOPSY, LIVER, PERCUTANEOUS;  Surgeon: Shelly Meyer MD;  [facility]    SINUS SURGERY  4/27/10       No current outpatient medications on file.          Allergies   Allergen Reactions    Sulfa Antibiotics Hives    Amoxicillin Rash       SOCIAL HISTORY:    Social History     Socioeconomic History    Marital status:      Spouse name: Not on file    Number of children: Not on file    Years of education: Not on file    Highest education level: Not on file   Occupational History    Not on file   Tobacco Use     Smoking status: Never    Smokeless tobacco: Never   Vaping Use    Vaping status: Never Used   Substance and Sexual Activity    Alcohol use: Yes     Alcohol/week: 0.0 standard drinks of alcohol     Comment: 0-6 per week     Drug use: No    Sexual activity: Yes     Partners: Female     Birth control/protection: Condom   Other Topics Concern    Parent/sibling w/ CABG, MI or angioplasty before 65F 55M? 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[employer]     Social Drivers of Health     Financial Resource Strain: Low Risk  (7/16/2024)    Financial Resource Strain     Within the past 12 months, have you or your family members you live with been unable to get utilities (heat, electricity) when it was really needed?: No   Food Insecurity: Low Risk  (7/16/2024)    Food Insecurity     Within the past 12 months, did you worry that your food would run out before you got money to buy more?: No     Within the past 12 months, did the food you bought just not last and you didn t have money to get more?: No   Transportation Needs: Low Risk  (7/16/2024)    Transportation Needs     Within the past 12 months, has lack of transportation kept you from medical appointments, getting your medicines, non-medical meetings or appointments, work, or from getting things that you need?: No   Physical Activity: Unknown (7/16/2024)    Exercise Vital Sign     Days of Exercise per Week: 5 days     Minutes of Exercise per Session: Not on file   Stress: No Stress Concern Present (7/16/2024)    [place] [employer] - Occupational Stress Questionnaire     Feeling of Stress : Not at all   Social Connections: Unknown (7/16/2024)    Social Connection and Isolation Panel [NHANES]     Frequency of Communication with Friends and Family: Not on file     Frequency of Social Gatherings with Friends and Family: Three times a week     Attends Orthodox Services: Not on file     Active Member of Clubs  or Organizations: Not on file     Attends Club or Organization Meetings: Not on file     Marital Status: Not on file   Interpersonal Safety: Low Risk  (7/16/2024)    Interpersonal Safety     Do you feel physically and emotionally safe where you currently live?: Yes     Within the past 12 months, have you been hit, slapped, kicked or otherwise physically hurt by someone?: No     Within the past 12 months, have you been humiliated or emotionally abused in other ways by your partner or ex-partner?: No   Housing Stability: Low Risk  (7/16/2024)    Housing Stability     Do you have housing? : Yes     Are you worried about losing your housing?: No       FAMILY HISTORY: Reviewed in EMR      REVIEW OF SYSTEMS: Positive for that noted in past medical history and history of present illness and otherwise reviewed in EMR     PHYSICAL EXAM:    Adult male in no acute distress. Articulates and communicates with normal affect.  Respirations even and unlabored  Focused upper extremity exam: Skin intact. No erythema. Sensation intact all dermatomes into the hand to light touch. EPL, FPL, and Intrinsics intact. Right shoulder active motion is FE to 175, ER at side to 75, and IR to T8. Left shoulder active motion is FE to 180, ER to 80, and IR to T4.  Minimal pain with Neer and Betts. No pain on palpation over the AC joint. Mild pain on palpation over the long head of the biceps. FE and ER strength near 5/5.    IMAGING:  Right shoulder Xrays 11/14/23 were reviewed and demonstrate no glenohumeral OA. No superior humeral migration.     Right shoulder MRI dated 1/23/25 was reviewed and demonstrates a full thickness supraspinatus tear involving nearly the entirety of the tendon from anterior to posterior. Limited retraction. No atrophy.     ASSESSMENT:    Right full thickness supraspinatus tear    PLAN:  I reviewed the imaging with the patient and we discussed the risks and benefits of nonoperative and operative treatment options. We  discussed the risk of tear progression or enlargement over time. We reviewed the operative options and postoperative timeline. He would most want to participate in a [employer] golf tournament early this summer and is going to try to participate but will consider surgery at [facility] if his symptoms do not allow him to compete.     Sada Suarez MD

## 2025-04-15 ENCOUNTER — OFFICE VISIT (OUTPATIENT)
Dept: FAMILY MEDICINE | Facility: CLINIC | Age: 40
End: 2025-04-15
Payer: COMMERCIAL

## 2025-04-15 VITALS
WEIGHT: 152.6 LBS | HEIGHT: 70 IN | TEMPERATURE: 97 F | SYSTOLIC BLOOD PRESSURE: 115 MMHG | RESPIRATION RATE: 16 BRPM | BODY MASS INDEX: 21.85 KG/M2 | DIASTOLIC BLOOD PRESSURE: 84 MMHG | HEART RATE: 70 BPM | OXYGEN SATURATION: 100 %

## 2025-04-15 DIAGNOSIS — Z01.818 PREOP GENERAL PHYSICAL EXAM: Primary | ICD-10-CM

## 2025-04-15 DIAGNOSIS — S46.001D INJURY OF RIGHT ROTATOR CUFF, SUBSEQUENT ENCOUNTER: ICD-10-CM

## 2025-04-15 LAB
ANION GAP SERPL CALCULATED.3IONS-SCNC: 10 MMOL/L (ref 7–15)
BUN SERPL-MCNC: 16.1 MG/DL (ref 6–20)
CALCIUM SERPL-MCNC: 10 MG/DL (ref 8.8–10.4)
CHLORIDE SERPL-SCNC: 102 MMOL/L (ref 98–107)
CREAT SERPL-MCNC: 1.11 MG/DL (ref 0.67–1.17)
EGFRCR SERPLBLD CKD-EPI 2021: 86 ML/MIN/1.73M2
GLUCOSE SERPL-MCNC: 95 MG/DL (ref 70–99)
HCO3 SERPL-SCNC: 29 MMOL/L (ref 22–29)
HGB BLD-MCNC: 15 G/DL (ref 13.3–17.7)
POTASSIUM SERPL-SCNC: 4.4 MMOL/L (ref 3.4–5.3)
SODIUM SERPL-SCNC: 141 MMOL/L (ref 135–145)

## 2025-04-15 PROCEDURE — 80048 BASIC METABOLIC PNL TOTAL CA: CPT | Performed by: NURSE PRACTITIONER

## 2025-04-15 PROCEDURE — 99214 OFFICE O/P EST MOD 30 MIN: CPT | Performed by: NURSE PRACTITIONER

## 2025-04-15 PROCEDURE — 36415 COLL VENOUS BLD VENIPUNCTURE: CPT | Performed by: NURSE PRACTITIONER

## 2025-04-15 PROCEDURE — 85018 HEMOGLOBIN: CPT | Performed by: NURSE PRACTITIONER

## 2025-04-15 ASSESSMENT — PAIN SCALES - GENERAL: PAINLEVEL_OUTOF10: MILD PAIN (1)

## 2025-04-15 NOTE — PATIENT INSTRUCTIONS
How to Take Your Medication Before Surgery  Preoperative Medication Instructions   Antiplatelet or Anticoagulation Medication Instructions   - We reviewed the medication list and the patient is not on an antiplatelet or anticoagulation medications.    Additional Medication Instructions   - Herbal medications and vitamins: DO NOT TAKE 14 days prior to surgery.

## 2025-04-15 NOTE — PROGRESS NOTES
Preoperative Evaluation  [address] [address]  Phone: [phone]  Primary Provider: Hayder Reis MD  Pre-op Performing Provider: MOO Meeks CNP  Apr 15, 2025             4/14/2025   Surgical Information   What procedure is being done? Right rot cuff repair   Facility or Hospital where procedure/surgery will be performed: [facility]   Who is doing the procedure / surgery? Dr Carpenter   Date of surgery / procedure: 4-24-25   Time of surgery / procedure: 230pm   Where do you plan to recover after surgery? at home with family     Fax number for surgical facility: [phone]    Assessment & Plan     The proposed surgical procedure is considered INTERMEDIATE risk.    Preop general physical exam    - Hemoglobin; Future  - Basic metabolic panel  (Ca, Cl, CO2, Creat, Gluc, K, Na, BUN); Future  - Hemoglobin  - Basic metabolic panel  (Ca, Cl, CO2, Creat, Gluc, K, Na, BUN)    Rotator cuff injury          - No identified additional risk factors other than previously addressed    Preoperative Medication Instructions  Antiplatelet or Anticoagulation Medication Instructions   - We reviewed the medication list and the patient is not on an antiplatelet or anticoagulation medications.    Additional Medication Instructions   - Herbal medications and vitamins: DO NOT TAKE 14 days prior to surgery.    Recommendation  Approval given to proceed with proposed procedure, without further diagnostic evaluation.    Jennifer Aponte is a 40 year old, presenting for the following:  Pre-Op Exam          4/15/2025    10:09 AM   Additional Questions   Roomed by Jany     [facility]: Injured his rotator cuff in Dec, repair planned.          4/14/2025   Pre-Op Questionnaire   Have you ever had a heart attack or stroke? No   Have you ever had surgery on your heart or blood vessels, such as a stent placement, a coronary artery bypass, or surgery on an artery in your head,  neck, heart, or legs? No   Do you have chest pain with activity? No   Do you have a history of heart failure? No   Do you currently have a cold, bronchitis or symptoms of other infection? No   Do you have a cough, shortness of breath, or wheezing? No   Do you or anyone in your family have previous history of blood clots? No   Do you or does anyone in your family have a serious bleeding problem such as prolonged bleeding following surgeries or cuts? No   Have you ever had problems with anemia or been told to take iron pills? No   Have you had any abnormal blood loss such as black, tarry or bloody stools? No   Have you ever had a blood transfusion? No   Are you willing to have a blood transfusion if it is medically needed before, during, or after your surgery? Yes   Have you or any of your relatives ever had problems with anesthesia? No   Do you have sleep apnea, excessive snoring or daytime drowsiness? No   Do you have any artifical heart valves or other implanted medical devices like a pacemaker, defibrillator, or continuous glucose monitor? No   Do you have artificial joints? No   Are you allergic to latex? No     Health Care Directive  Patient does not have a Health Care Directive: Patient states has Advance Directive and will bring in a copy to clinic.    Preoperative Review of    reviewed - no record of controlled substances prescribed.          Patient Active Problem List    Diagnosis Date Noted    Acute pain of right shoulder 02/04/2025     Priority: Medium    GERD (gastroesophageal reflux disease) 05/30/2010     Priority: Medium    Chronic nonallergic rhinitis      Priority: Medium      Past Medical History:   Diagnosis Date    Chronic nonallergic rhinitis 5/27/10 skin tests    all negative    Gastroesophageal reflux disease without esophagitis 10/6/2015    GERD (gastroesophageal reflux disease)      Past Surgical History:   Procedure Laterality Date    APPENDECTOMY      ESOPHAGOSCOPY, GASTROSCOPY,  "DUODENOSCOPY (EGD), COMBINED N/A 11/09/2018    Procedure: COMBINED ESOPHAGOSCOPY, GASTROSCOPY, DUODENOSCOPY (EGD), BIOPSY SINGLE OR MULTIPLE;  Surgeon: Remy Vigil MD;  Location:  GI    HEAD & NECK SURGERY  2010    Concussion and trigger points    IR LIVER BIOPSY PERCUTANEOUS  09/01/2020    IRRIGATION AND DEBRIDEMENT HAND, COMBINED Right 05/20/2020    Procedure: Irrigation and debridement of the right long finger and its flexor sheath;  Surgeon: Jeannine Escobar MD;  Location: [facility] OR    PERCUTANEOUS BIOPSY LIVER Right 09/01/2020    Procedure: NEEDLE BIOPSY, LIVER, PERCUTANEOUS;  Surgeon: Shelly Meyer MD;  Location: [facility] OR    SINUS SURGERY  04/27/2010     No current outpatient medications on file.       Allergies   Allergen Reactions    Sulfa Antibiotics Hives    Amoxicillin Rash        Social History     Tobacco Use    Smoking status: Never     Passive exposure: Never    Smokeless tobacco: Never   Substance Use Topics    Alcohol use: Yes     Alcohol/week: 0.0 standard drinks of alcohol     Comment: 0-6 per week      Family History   Problem Relation Age of Onset    Hypertension Father     Cardiovascular Maternal Grandfather     Heart Disease Maternal Grandfather     Allergies Brother      History   Drug Use No             Review of Systems  CONSTITUTIONAL: NEGATIVE for fever, chills, change in weight  RESP: NEGATIVE for significant cough or SOB  CV: NEGATIVE for chest pain, palpitations or peripheral edema    Objective    Temp 97  F (36.1  C) (Temporal)   Resp 16   Ht 1.784 m (5' 10.24\")   Wt 69.2 kg (152 lb 9.6 oz)   BMI 21.75 kg/m     Estimated body mass index is 21.75 kg/m  as calculated from the following:    Height as of this encounter: 1.784 m (5' 10.24\").    Weight as of this encounter: 69.2 kg (152 lb 9.6 oz).  Physical Exam  GENERAL: alert and no distress  NECK: no adenopathy, no asymmetry, masses, or scars  RESP: lungs clear to auscultation - no rales, rhonchi or wheezes  CV: " regular rate and rhythm, normal S1 S2, no S3 or S4, no murmur, click or rub, no peripheral edema  ABDOMEN: soft, nontender, no hepatosplenomegaly, no masses and bowel sounds normal  MS: no gross musculoskeletal defects noted, no edema    Recent Labs   Lab Test 11/11/24  1447 09/25/24  1357 07/16/24  1106   HGB 15.2 14.6 14.7    242 239     --  139   POTASSIUM 5.0  --  4.7   CR 1.09  --  1.11        Diagnostics  Labs pending at this time.  Results will be reviewed when available.   No EKG required, no history of coronary heart disease, significant arrhythmia, peripheral arterial disease or other structural heart disease.    Revised Cardiac Risk Index (RCRI)  The patient has the following serious cardiovascular risks for perioperative complications:   - No serious cardiac risks = 0 points     RCRI Interpretation: 0 points: Class I (very low risk - 0.4% complication rate)         Signed Electronically by: MOO Meeks CNP  A copy of this evaluation report is provided to the requesting physician.

## 2025-04-29 PROBLEM — M25.511 ACUTE PAIN OF RIGHT SHOULDER: Status: RESOLVED | Noted: 2025-02-04 | Resolved: 2025-04-29

## 2025-05-01 ENCOUNTER — TRANSFERRED RECORDS (OUTPATIENT)
Dept: HEALTH INFORMATION MANAGEMENT | Facility: CLINIC | Age: 40
End: 2025-05-01
Payer: COMMERCIAL

## 2025-06-05 ENCOUNTER — TRANSFERRED RECORDS (OUTPATIENT)
Dept: HEALTH INFORMATION MANAGEMENT | Facility: CLINIC | Age: 40
End: 2025-06-05
Payer: COMMERCIAL

## 2025-06-16 ENCOUNTER — PATIENT OUTREACH (OUTPATIENT)
Dept: CARE COORDINATION | Facility: CLINIC | Age: 40
End: 2025-06-16
Payer: COMMERCIAL

## 2025-07-12 ENCOUNTER — OFFICE VISIT (OUTPATIENT)
Dept: URGENT CARE | Facility: URGENT CARE | Age: 40
End: 2025-07-12
Payer: COMMERCIAL

## 2025-07-12 VITALS
WEIGHT: 159.3 LBS | HEART RATE: 68 BPM | BODY MASS INDEX: 22.81 KG/M2 | OXYGEN SATURATION: 100 % | HEIGHT: 70 IN | DIASTOLIC BLOOD PRESSURE: 85 MMHG | SYSTOLIC BLOOD PRESSURE: 116 MMHG | TEMPERATURE: 97.8 F

## 2025-07-12 DIAGNOSIS — S05.01XA ABRASION OF RIGHT CORNEA, INITIAL ENCOUNTER: Primary | ICD-10-CM

## 2025-07-12 PROCEDURE — 99213 OFFICE O/P EST LOW 20 MIN: CPT | Performed by: PHYSICIAN ASSISTANT

## 2025-07-12 RX ORDER — CIPROFLOXACIN HYDROCHLORIDE 3.5 MG/ML
1-2 SOLUTION/ DROPS TOPICAL 4 TIMES DAILY
Qty: 10 ML | Refills: 0 | Status: SHIPPED | OUTPATIENT
Start: 2025-07-12 | End: 2025-07-19

## 2025-07-12 ASSESSMENT — ENCOUNTER SYMPTOMS
COUGH: 0
SORE THROAT: 0
WHEEZING: 0
EYE REDNESS: 1
PHOTOPHOBIA: 0
SINUS PAIN: 0
PALPITATIONS: 0
CHILLS: 0
SHORTNESS OF BREATH: 0
FEVER: 0
EYE DISCHARGE: 1
RHINORRHEA: 0
SINUS PRESSURE: 0
EYE PAIN: 1
FATIGUE: 0
CARDIOVASCULAR NEGATIVE: 1
EYE ITCHING: 0

## 2025-07-12 ASSESSMENT — VISUAL ACUITY: OU: 1

## 2025-07-12 ASSESSMENT — PAIN SCALES - GENERAL: PAINLEVEL_OUTOF10: MILD PAIN (2)
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
## 2025-07-14 ENCOUNTER — PATIENT OUTREACH (OUTPATIENT)
Dept: CARE COORDINATION | Facility: CLINIC | Age: 40
End: 2025-07-14
Payer: COMMERCIAL

## 2025-07-15 ENCOUNTER — TRANSFERRED RECORDS (OUTPATIENT)
Dept: HEALTH INFORMATION MANAGEMENT | Facility: CLINIC | Age: 40
End: 2025-07-15
Payer: COMMERCIAL

## 2025-07-16 ENCOUNTER — PATIENT OUTREACH (OUTPATIENT)
Dept: CARE COORDINATION | Facility: CLINIC | Age: 40
End: 2025-07-16
Payer: COMMERCIAL

## 2025-08-24 ENCOUNTER — HEALTH MAINTENANCE LETTER (OUTPATIENT)
Age: 40
End: 2025-08-24

## (undated) DEVICE — SU CHROMIC 4-0 RB-1 27" U203H

## (undated) DEVICE — TUBING SUCTION 12"X1/4" N612

## (undated) DEVICE — Device

## (undated) DEVICE — ESU ELEC BLADE 2.75" COATED/INSULATED E1455

## (undated) DEVICE — SPECIMEN CONTAINER W/10% BUFFERED FORMALIN 120ML 591201

## (undated) DEVICE — PACK HAND SOP32HARMO

## (undated) DEVICE — WIRE GUIDE BENSON STR .035X50CM G00661

## (undated) DEVICE — SOL NACL 0.9% INJ 250ML BAG 2B1322Q

## (undated) DEVICE — SYR 10ML FINGER CONTROL W/O NDL 309695

## (undated) DEVICE — TOURNIQUET CUFF 18" REPRO RED 60-7070-103

## (undated) DEVICE — LINEN TOWEL PACK X5 5464

## (undated) DEVICE — SUCTION CANISTER MEDIVAC LINER 3000ML W/LID 65651-530

## (undated) DEVICE — CAST PADDING 4" STERILE 9044S

## (undated) DEVICE — PREP SKIN SCRUB TRAY 4461A

## (undated) DEVICE — DRSG ABDOMINAL 07 1/2X8" 7197D

## (undated) DEVICE — BLADE KNIFE SURG 11 371111

## (undated) DEVICE — DRSG PRIMAPORE 02X3" 7133

## (undated) DEVICE — CATH IV 16X1-1/4 PROTECTIV 326210

## (undated) DEVICE — PREP POVIDONE IODINE SOLUTION 10% 4OZ BOTTLE 29906-004

## (undated) DEVICE — APPLICATOR COTTON TIP 6"X2 STERILE LF 6012

## (undated) DEVICE — NDL 18GAX1.5" 305185

## (undated) DEVICE — GOWN XLG DISP 9545

## (undated) DEVICE — LINEN HALF SHEET 5512

## (undated) DEVICE — GLOVE PROTEXIS POWDER FREE SMT 7.5  2D72PT75X

## (undated) DEVICE — BNDG KLING 2" 2231

## (undated) DEVICE — BNDG COBAN 2"X5YDS UNSTERILE 2082

## (undated) DEVICE — SU ETHILON 4-0 PS-2 18" BLACK 1667H

## (undated) DEVICE — BNDG ELASTIC 4"X5YDS UNSTERILE 6611-40

## (undated) DEVICE — DECANTER BAG 2002S

## (undated) DEVICE — KIT CULTURE ESWAB AEROBE/ANAEROBE WHITE TOP R723480

## (undated) DEVICE — LINEN FULL SHEET 5511

## (undated) DEVICE — BAG CLEAR TRASH 1.3M 39X33" P4040C

## (undated) DEVICE — COVER ULTRASOUND PROBE W/GEL FLEXI-FEEL 6"X58" LF  25-FF658

## (undated) DEVICE — GELFOAM 7X12MM

## (undated) DEVICE — LINEN GOWN XLG 5407

## (undated) DEVICE — PREP POVIDONE IODINE SCRUB 7.5% 4OZ APL82212

## (undated) DEVICE — NDL BIOPSY TEMNO 18GAX15CM ACT1815

## (undated) DEVICE — ESU PENCIL W/HOLSTER E2350H

## (undated) RX ORDER — DIPHENHYDRAMINE HYDROCHLORIDE 50 MG/ML
INJECTION INTRAMUSCULAR; INTRAVENOUS
Status: DISPENSED
Start: 2020-05-20

## (undated) RX ORDER — FENTANYL CITRATE 50 UG/ML
INJECTION, SOLUTION INTRAMUSCULAR; INTRAVENOUS
Status: DISPENSED
Start: 2020-05-20

## (undated) RX ORDER — PROPOFOL 10 MG/ML
INJECTION, EMULSION INTRAVENOUS
Status: DISPENSED
Start: 2020-05-20

## (undated) RX ORDER — ACETAMINOPHEN 325 MG/1
TABLET ORAL
Status: DISPENSED
Start: 2020-05-20

## (undated) RX ORDER — FENTANYL CITRATE 50 UG/ML
INJECTION, SOLUTION INTRAMUSCULAR; INTRAVENOUS
Status: DISPENSED
Start: 2018-11-08

## (undated) RX ORDER — LIDOCAINE HYDROCHLORIDE 10 MG/ML
INJECTION, SOLUTION EPIDURAL; INFILTRATION; INTRACAUDAL; PERINEURAL
Status: DISPENSED
Start: 2020-05-20

## (undated) RX ORDER — GINSENG 100 MG
CAPSULE ORAL
Status: DISPENSED
Start: 2020-05-20

## (undated) RX ORDER — BUPIVACAINE HYDROCHLORIDE 2.5 MG/ML
INJECTION, SOLUTION EPIDURAL; INFILTRATION; INTRACAUDAL
Status: DISPENSED
Start: 2020-05-20

## (undated) RX ORDER — FENTANYL CITRATE 50 UG/ML
INJECTION, SOLUTION INTRAMUSCULAR; INTRAVENOUS
Status: DISPENSED
Start: 2018-11-09

## (undated) RX ORDER — GLYCOPYRROLATE 0.2 MG/ML
INJECTION INTRAMUSCULAR; INTRAVENOUS
Status: DISPENSED
Start: 2020-05-20